# Patient Record
Sex: FEMALE | Race: WHITE | NOT HISPANIC OR LATINO
[De-identification: names, ages, dates, MRNs, and addresses within clinical notes are randomized per-mention and may not be internally consistent; named-entity substitution may affect disease eponyms.]

---

## 2017-05-26 ENCOUNTER — PRIOR ORIGINAL RECORDS (OUTPATIENT)
Dept: OTHER | Age: 67
End: 2017-05-26

## 2017-08-01 ENCOUNTER — HOSPITAL ENCOUNTER (OUTPATIENT)
Dept: GENERAL RADIOLOGY | Age: 67
Discharge: HOME | End: 2017-08-01
Attending: FAMILY MEDICINE
Payer: MEDICARE

## 2017-08-01 ENCOUNTER — OFFICE VISIT (OUTPATIENT)
Dept: FAMILY MEDICINE CLINIC | Facility: CLINIC | Age: 67
End: 2017-08-01

## 2017-08-01 VITALS
TEMPERATURE: 99 F | RESPIRATION RATE: 12 BRPM | BODY MASS INDEX: 35.87 KG/M2 | HEART RATE: 64 BPM | WEIGHT: 205 LBS | DIASTOLIC BLOOD PRESSURE: 102 MMHG | SYSTOLIC BLOOD PRESSURE: 164 MMHG | HEIGHT: 63.5 IN

## 2017-08-01 DIAGNOSIS — E78.00 HYPERCHOLESTEREMIA: ICD-10-CM

## 2017-08-01 DIAGNOSIS — I10 BENIGN ESSENTIAL HYPERTENSION: Primary | ICD-10-CM

## 2017-08-01 DIAGNOSIS — S89.91XA RIGHT KNEE INJURY, INITIAL ENCOUNTER: ICD-10-CM

## 2017-08-01 DIAGNOSIS — Z12.31 ENCOUNTER FOR SCREENING MAMMOGRAM FOR MALIGNANT NEOPLASM OF BREAST: ICD-10-CM

## 2017-08-01 DIAGNOSIS — Z98.890 H/O RIGHT BREAST BIOPSY: ICD-10-CM

## 2017-08-01 PROCEDURE — 73560 X-RAY EXAM OF KNEE 1 OR 2: CPT | Performed by: FAMILY MEDICINE

## 2017-08-01 PROCEDURE — 99204 OFFICE O/P NEW MOD 45 MIN: CPT | Performed by: FAMILY MEDICINE

## 2017-08-01 RX ORDER — BUPRENORPHINE HCL 8 MG/1
1 TABLET SUBLINGUAL DAILY
COMMUNITY
End: 2020-08-03

## 2017-08-01 RX ORDER — PNV NO.95/FERROUS FUM/FOLIC AC 28MG-0.8MG
TABLET ORAL DAILY
COMMUNITY
End: 2018-04-05 | Stop reason: ALTCHOICE

## 2017-08-01 RX ORDER — ROSUVASTATIN CALCIUM 40 MG/1
40 TABLET, COATED ORAL NIGHTLY
COMMUNITY
End: 2018-05-08

## 2017-08-01 RX ORDER — BIOTIN 1 MG
2000 TABLET ORAL DAILY
COMMUNITY
End: 2020-03-05

## 2017-08-01 NOTE — PROGRESS NOTES
HPI:   Whit Harrison is a 74year old female here for her first visit who presents for recheck of hyperlipidemia. Patient reports taking medications as instructed, no medication side effects noted. Denies any generalized muscle aches.  Patient present watches fats closely     REVIEW OF SYSTEMS:   GENERAL HEALTH: feels well otherwise  SKIN: denies any unusual skin lesions or rashes  RESPIRATORY: denies shortness of breath with exertion  CARDIOVASCULAR: denies chest pain on exertion  GI: denies abdominal

## 2017-08-02 ENCOUNTER — TELEPHONE (OUTPATIENT)
Dept: FAMILY MEDICINE CLINIC | Facility: CLINIC | Age: 67
End: 2017-08-02

## 2017-08-02 ENCOUNTER — HOSPITAL ENCOUNTER (OUTPATIENT)
Dept: MRI IMAGING | Facility: HOSPITAL | Age: 67
Discharge: HOME | End: 2017-08-02
Attending: FAMILY MEDICINE
Payer: MEDICARE

## 2017-08-02 DIAGNOSIS — S89.91XA UNSPECIFIED INJURY OF RIGHT LOWER LEG, INITIAL ENCOUNTER: ICD-10-CM

## 2017-08-02 DIAGNOSIS — R93.6 ABNORMAL X-RAY OF LOWER EXTREMITY: ICD-10-CM

## 2017-08-02 DIAGNOSIS — R93.6 ABNORMAL X-RAY OF LOWER EXTREMITY: Primary | ICD-10-CM

## 2017-08-02 PROBLEM — I10 BENIGN ESSENTIAL HYPERTENSION: Status: ACTIVE | Noted: 2017-08-02

## 2017-08-02 PROCEDURE — 73721 MRI JNT OF LWR EXTRE W/O DYE: CPT | Performed by: FAMILY MEDICINE

## 2017-08-03 ENCOUNTER — TELEPHONE (OUTPATIENT)
Dept: FAMILY MEDICINE CLINIC | Facility: CLINIC | Age: 67
End: 2017-08-03

## 2017-08-03 NOTE — TELEPHONE ENCOUNTER
Pt wanted to make Dr. Ramesh Falling aware that she completed the MRI of her right knee last night at [facility] at 6:30 pm. Pt inquiring about results and would like to know where she goes from this point. Please contact pt.

## 2017-08-03 NOTE — TELEPHONE ENCOUNTER
S/w pt. Advised of Dr Guadalupe Arzola, same # as Dr Luciana Arroyo. She agrees to contact. She asks what she should do until appt. She reports she has a knee brace, asks if she should use. I stated she could use. I advised to do activity only as tolerated.  Supportive ca

## 2017-08-03 NOTE — TELEPHONE ENCOUNTER
Pt was referred to DR Dale Cason). She can not get in until 8/24/17. Is this ok? What should she do until then? Please advise. Thank you.

## 2017-08-04 ENCOUNTER — MED REC SCAN ONLY (OUTPATIENT)
Dept: FAMILY MEDICINE CLINIC | Facility: CLINIC | Age: 67
End: 2017-08-04

## 2017-08-21 ENCOUNTER — LAB ENCOUNTER (OUTPATIENT)
Dept: LAB | Age: 67
End: 2017-08-21
Attending: FAMILY MEDICINE
Payer: MEDICARE

## 2017-08-21 ENCOUNTER — HOSPITAL ENCOUNTER (OUTPATIENT)
Dept: MAMMOGRAPHY | Age: 67
Discharge: HOME | End: 2017-08-21
Attending: FAMILY MEDICINE
Payer: MEDICARE

## 2017-08-21 DIAGNOSIS — Z98.890 H/O RIGHT BREAST BIOPSY: ICD-10-CM

## 2017-08-21 DIAGNOSIS — Z12.31 ENCOUNTER FOR SCREENING MAMMOGRAM FOR MALIGNANT NEOPLASM OF BREAST: ICD-10-CM

## 2017-08-21 DIAGNOSIS — S89.91XA RIGHT KNEE INJURY, INITIAL ENCOUNTER: ICD-10-CM

## 2017-08-21 DIAGNOSIS — R73.09 ELEVATED GLUCOSE: ICD-10-CM

## 2017-08-21 DIAGNOSIS — E78.00 HYPERCHOLESTEREMIA: ICD-10-CM

## 2017-08-21 DIAGNOSIS — I10 BENIGN ESSENTIAL HYPERTENSION: ICD-10-CM

## 2017-08-21 LAB
ALBUMIN SERPL-MCNC: 4 G/DL (ref 3.5–4.8)
ALP LIVER SERPL-CCNC: 116 U/L (ref 50–130)
ALT SERPL-CCNC: 35 U/L (ref 14–54)
AST SERPL-CCNC: 23 U/L (ref 15–41)
BASOPHILS # BLD AUTO: 0.06 X10(3) UL (ref 0–0.1)
BASOPHILS NFR BLD AUTO: 1 %
BILIRUB SERPL-MCNC: 0.5 MG/DL (ref 0.1–2)
BUN BLD-MCNC: 18 MG/DL (ref 8–20)
CALCIUM BLD-MCNC: 9.6 MG/DL (ref 8.3–10.3)
CHLORIDE: 104 MMOL/L (ref 101–111)
CHOLEST SMN-MCNC: 199 MG/DL (ref ?–200)
CO2: 28 MMOL/L (ref 22–32)
CREAT BLD-MCNC: 0.99 MG/DL (ref 0.55–1.02)
EOSINOPHIL # BLD AUTO: 0.25 X10(3) UL (ref 0–0.3)
EOSINOPHIL NFR BLD AUTO: 4.4 %
ERYTHROCYTE [DISTWIDTH] IN BLOOD BY AUTOMATED COUNT: 15.9 % (ref 11.5–16)
GLUCOSE BLD-MCNC: 106 MG/DL (ref 70–99)
HCT VFR BLD AUTO: 38.1 % (ref 34–50)
HDLC SERPL-MCNC: 85 MG/DL (ref 45–?)
HDLC SERPL: 2.34 {RATIO} (ref ?–4.44)
HGB BLD-MCNC: 12 G/DL (ref 12–16)
IMMATURE GRANULOCYTE COUNT: 0.02 X10(3) UL (ref 0–1)
IMMATURE GRANULOCYTE RATIO %: 0.3 %
LDLC SERPL CALC-MCNC: 79 MG/DL (ref ?–130)
LDLC SERPL-MCNC: 35 MG/DL (ref 5–40)
LYMPHOCYTES # BLD AUTO: 1.85 X10(3) UL (ref 0.9–4)
LYMPHOCYTES NFR BLD AUTO: 32.3 %
M PROTEIN MFR SERPL ELPH: 8.2 G/DL (ref 6.1–8.3)
MCH RBC QN AUTO: 28.1 PG (ref 27–33.2)
MCHC RBC AUTO-ENTMCNC: 31.5 G/DL (ref 31–37)
MCV RBC AUTO: 89.2 FL (ref 81–100)
MONOCYTES # BLD AUTO: 0.58 X10(3) UL (ref 0.1–0.6)
MONOCYTES NFR BLD AUTO: 10.1 %
NEUTROPHIL ABS PRELIM: 2.96 X10 (3) UL (ref 1.3–6.7)
NEUTROPHILS # BLD AUTO: 2.96 X10(3) UL (ref 1.3–6.7)
NEUTROPHILS NFR BLD AUTO: 51.9 %
NONHDLC SERPL-MCNC: 114 MG/DL (ref ?–130)
PLATELET # BLD AUTO: 314 10(3)UL (ref 150–450)
POTASSIUM SERPL-SCNC: 3.8 MMOL/L (ref 3.6–5.1)
RBC # BLD AUTO: 4.27 X10(6)UL (ref 3.8–5.1)
RED CELL DISTRIBUTION WIDTH-SD: 52.1 FL (ref 35.1–46.3)
SODIUM SERPL-SCNC: 139 MMOL/L (ref 136–144)
TRIGLYCERIDES: 177 MG/DL (ref ?–150)
WBC # BLD AUTO: 5.7 X10(3) UL (ref 4–13)

## 2017-08-21 PROCEDURE — 85025 COMPLETE CBC W/AUTO DIFF WBC: CPT

## 2017-08-21 PROCEDURE — 80053 COMPREHEN METABOLIC PANEL: CPT

## 2017-08-21 PROCEDURE — 77067 SCR MAMMO BI INCL CAD: CPT | Performed by: FAMILY MEDICINE

## 2017-08-21 PROCEDURE — 36415 COLL VENOUS BLD VENIPUNCTURE: CPT

## 2017-08-21 PROCEDURE — 77063 BREAST TOMOSYNTHESIS BI: CPT | Performed by: FAMILY MEDICINE

## 2017-08-21 PROCEDURE — 83036 HEMOGLOBIN GLYCOSYLATED A1C: CPT

## 2017-08-21 PROCEDURE — 80061 LIPID PANEL: CPT

## 2017-08-22 ENCOUNTER — TELEPHONE (OUTPATIENT)
Dept: FAMILY MEDICINE CLINIC | Facility: CLINIC | Age: 67
End: 2017-08-22

## 2017-08-22 DIAGNOSIS — R73.09 ELEVATED GLUCOSE: Primary | ICD-10-CM

## 2017-08-22 PROBLEM — M17.11 PRIMARY OSTEOARTHRITIS OF RIGHT KNEE: Status: ACTIVE | Noted: 2017-08-22

## 2017-08-22 PROBLEM — S83.241A ACUTE MEDIAL MENISCUS TEAR OF RIGHT KNEE, INITIAL ENCOUNTER: Status: ACTIVE | Noted: 2017-08-22

## 2017-08-22 LAB
EST. AVERAGE GLUCOSE BLD GHB EST-MCNC: 126 MG/DL (ref 68–126)
HBA1C MFR BLD HPLC: 6 % (ref ?–5.7)

## 2017-08-24 ENCOUNTER — TELEPHONE (OUTPATIENT)
Dept: FAMILY MEDICINE CLINIC | Facility: CLINIC | Age: 67
End: 2017-08-24

## 2017-08-24 DIAGNOSIS — I10 BENIGN ESSENTIAL HYPERTENSION: Primary | ICD-10-CM

## 2017-08-24 DIAGNOSIS — R73.03 PRE-DIABETES: ICD-10-CM

## 2017-08-24 DIAGNOSIS — E78.00 HYPERCHOLESTEREMIA: ICD-10-CM

## 2017-08-31 ENCOUNTER — HOSPITAL ENCOUNTER (OUTPATIENT)
Dept: MAMMOGRAPHY | Facility: HOSPITAL | Age: 67
Discharge: HOME | End: 2017-08-31
Attending: FAMILY MEDICINE
Payer: MEDICARE

## 2017-08-31 DIAGNOSIS — R92.8 ABNORMAL MAMMOGRAM OF RIGHT BREAST: ICD-10-CM

## 2017-08-31 PROCEDURE — 77065 DX MAMMO INCL CAD UNI: CPT | Performed by: FAMILY MEDICINE

## 2017-08-31 PROCEDURE — 77061 BREAST TOMOSYNTHESIS UNI: CPT | Performed by: FAMILY MEDICINE

## 2017-08-31 PROCEDURE — 76642 ULTRASOUND BREAST LIMITED: CPT | Performed by: FAMILY MEDICINE

## 2017-09-01 ENCOUNTER — PATIENT MESSAGE (OUTPATIENT)
Dept: FAMILY MEDICINE CLINIC | Facility: CLINIC | Age: 67
End: 2017-09-01

## 2017-09-01 NOTE — TELEPHONE ENCOUNTER
Call back from pt-sts she did receive mychart messg with dr comments re labs-glucose, A1C, total and LDL cholesterol continuing rosuvastatin and repeating labs in 6 months, \"but  did not specifically address my elevated triglycerides.  Would like his com

## 2017-10-12 ENCOUNTER — IMMUNIZATION (OUTPATIENT)
Dept: FAMILY MEDICINE CLINIC | Facility: CLINIC | Age: 67
End: 2017-10-12

## 2017-10-12 PROCEDURE — G0008 ADMIN INFLUENZA VIRUS VAC: HCPCS | Performed by: FAMILY MEDICINE

## 2017-10-12 PROCEDURE — 90653 IIV ADJUVANT VACCINE IM: CPT | Performed by: FAMILY MEDICINE

## 2017-11-03 ENCOUNTER — OFFICE VISIT (OUTPATIENT)
Dept: FAMILY MEDICINE CLINIC | Facility: CLINIC | Age: 67
End: 2017-11-03

## 2017-11-03 VITALS
SYSTOLIC BLOOD PRESSURE: 130 MMHG | RESPIRATION RATE: 20 BRPM | BODY MASS INDEX: 35.68 KG/M2 | HEART RATE: 80 BPM | WEIGHT: 209 LBS | DIASTOLIC BLOOD PRESSURE: 80 MMHG | HEIGHT: 64 IN | TEMPERATURE: 99 F

## 2017-11-03 DIAGNOSIS — Z00.00 ENCOUNTER FOR ANNUAL HEALTH EXAMINATION: Primary | ICD-10-CM

## 2017-11-03 DIAGNOSIS — Z12.11 ENCOUNTER FOR SCREENING COLONOSCOPY: ICD-10-CM

## 2017-11-03 DIAGNOSIS — M17.11 PRIMARY OSTEOARTHRITIS OF RIGHT KNEE: ICD-10-CM

## 2017-11-03 DIAGNOSIS — N95.1 FLUSHING, MENOPAUSAL: ICD-10-CM

## 2017-11-03 DIAGNOSIS — Z23 NEED FOR VACCINATION: ICD-10-CM

## 2017-11-03 DIAGNOSIS — E78.00 HYPERCHOLESTEREMIA: ICD-10-CM

## 2017-11-03 DIAGNOSIS — K21.9 GASTROESOPHAGEAL REFLUX DISEASE WITHOUT ESOPHAGITIS: ICD-10-CM

## 2017-11-03 DIAGNOSIS — I10 BENIGN ESSENTIAL HYPERTENSION: ICD-10-CM

## 2017-11-03 DIAGNOSIS — Z78.0 POSTMENOPAUSAL: ICD-10-CM

## 2017-11-03 PROCEDURE — 99213 OFFICE O/P EST LOW 20 MIN: CPT | Performed by: FAMILY MEDICINE

## 2017-11-03 PROCEDURE — 90732 PPSV23 VACC 2 YRS+ SUBQ/IM: CPT | Performed by: FAMILY MEDICINE

## 2017-11-03 PROCEDURE — G0009 ADMIN PNEUMOCOCCAL VACCINE: HCPCS | Performed by: FAMILY MEDICINE

## 2017-11-03 PROCEDURE — G0402 INITIAL PREVENTIVE EXAM: HCPCS | Performed by: FAMILY MEDICINE

## 2017-11-03 RX ORDER — CARVEDILOL 6.25 MG/1
6.25 TABLET ORAL 2 TIMES DAILY WITH MEALS
COMMUNITY
Start: 2017-09-26 | End: 2018-09-13

## 2017-11-03 RX ORDER — LOSARTAN POTASSIUM AND HYDROCHLOROTHIAZIDE 25; 100 MG/1; MG/1
1 TABLET ORAL DAILY
COMMUNITY
Start: 2015-02-15 | End: 2018-07-12

## 2017-11-03 RX ORDER — ESTRADIOL 1 MG/1
1 TABLET ORAL DAILY
Qty: 30 TABLET | Refills: 5 | Status: SHIPPED | OUTPATIENT
Start: 2017-11-03 | End: 2018-05-01

## 2017-11-03 RX ORDER — PANTOPRAZOLE SODIUM 40 MG/1
40 TABLET, DELAYED RELEASE ORAL
COMMUNITY
Start: 2000-06-02 | End: 2018-05-08

## 2017-11-03 RX ORDER — HYDRALAZINE HYDROCHLORIDE 50 MG/1
TABLET, FILM COATED ORAL 2 TIMES DAILY
COMMUNITY
Start: 2017-09-11 | End: 2018-05-08 | Stop reason: ALTCHOICE

## 2017-11-03 RX ORDER — MULTIVIT,MIN/FOLIC ACID/HRB157 400 MCG
TABLET ORAL
COMMUNITY
Start: 2017-10-06 | End: 2017-11-05

## 2017-11-03 NOTE — PROGRESS NOTES
HPI:   Ilan Rowe is a 74year old female who presents for a Medicare Initial Preventative Physical Exam (Welcome to Medicare- < 12 months on Medicare). Patient states that overall she has felt well.   She did see Dr. Quan Sandoval in regard to her ri atorvastatin.     CURRENT MEDICATIONS:     Outpatient Prescriptions Marked as Taking for the 11/3/17 encounter (Office Visit) with Zana Harvey MD:  Losartan Potassium-HCTZ 100-25 MG Oral Tab    Pantoprazole Sodium 40 MG Oral Tab EC    hydrALAzine HC (37.1 °C) (Oral)   Resp 20   Ht 64\"   Wt 209 lb   BMI 35.87 kg/m²  Estimated body mass index is 35.87 kg/m² as calculated from the following:    Height as of this encounter: 64\". Weight as of this encounter: 209 lb.     Medicare Hearing Assessment  (Re Derivative, [place] 10/16/2017       ASSESSMENT AND OTHER RELEVANT CHRONIC CONDITIONS:   Bienvenido Ryan is a 74year old female who presents for a Medicare Assessment.      PLAN SUMMARY:   Diagnoses and all orders for this visit:    Encounter for annual Health     In the past six months, have you lost more than 10 pounds without trying?: 2 - No    Has your appetite been poor?: No    How does the patient maintain a good energy level?: Appropriate Exercise;Daily Walks    How would you describe your daily ph or hopeless (over the last two weeks)?: Several days    PHQ-2 SCORE: 2        Advance Directives     Do you have a healthcare power of ?: Yes    Do you have a living will?: Yes     Please go to \"Cognitive Assessment\" under Medicare Assessment sec reminders to display for this patient. Update Health Maintenance if applicable    Chlamydia  Annually if high risk No results found for: CHLAMYDIA No flowsheet data found.     Screening Mammogram      Mammogram Annually to 76, then as discussed Valentine Morrison Diabetes      HgbA1C  Annually HgbA1C (%)   Date Value   08/21/2017 6.0 (H)    No flowsheet data found. Creat/alb ratio  Annually      LDL  Annually LDL Cholesterol (mg/dL)   Date Value   08/21/2017 79    No flowsheet data found.      Dilated Eye exam

## 2017-11-03 NOTE — PATIENT INSTRUCTIONS
Maury Valerio's SCREENING SCHEDULE   Tests on this list are recommended by your physician but may not be covered, or covered at this frequency, by your insurer. Please check with your insurance carrier before scheduling to verify coverage.    Azalea Perales Covered every 10 years- more often if abnormal Colonoscopy,10 Years due on 02/02/2002 Update Health Maintenance if applicable    Flex Sigmoidoscopy Screen  Covered every 5 years No results found for this or any previous visit. No flowsheet data found. (Pneumovax)  Covered Once after 65 No orders found for this or any previous visit. Please get once after your 65th birthday    Hepatitis B for Moderate/High Risk       No orders found for this or any previous visit.  Medium/high risk factors:   End-stage re Procedure   Diabetes Screening      HbgA1C    At Least  Annually for Diabetics HgbA1C (%)   Date Value   08/21/2017 6.0 (H)    No flowsheet data found.     Fasting Blood Sugar (FSB)   Patient must be diagnosed with one of the following:   • Hypertension   • flowsheet data found.      Barium Enema-   uncomfortable but covered  Covered but uncomfortable   Glaucoma Screening      Ophthalmology Visit   Covered annually for Diabetics, people with Glaucoma family history,   Americans over age 48   Rizwana or IX concentrates   Clients of institutions for the mentally retarded   Persons who live in the same house as a HepB virus carrier   Homosexual men   Illicit injectable drug abusers     Tetanus Toxoid- Only covered with a cut with metal- TD and TDaP Not c

## 2017-11-05 PROBLEM — N95.1 FLUSHING, MENOPAUSAL: Status: ACTIVE | Noted: 2017-11-05

## 2017-11-07 ENCOUNTER — TELEPHONE (OUTPATIENT)
Dept: FAMILY MEDICINE CLINIC | Facility: CLINIC | Age: 67
End: 2017-11-07

## 2017-11-07 NOTE — TELEPHONE ENCOUNTER
Received request from Cinthya to do a PA for pt's Estradiol.   [facility]

## 2017-11-07 NOTE — TELEPHONE ENCOUNTER
Pt states Cinthya sent over a pre-authorization for estradiol (ESTRACE) 1 MG Oral Tab on Friday. Please advise. This msg printed and put on Debby's desk.

## 2017-11-09 NOTE — TELEPHONE ENCOUNTER
Letter of determination received, pt's Estradiol tablets approved. 8/11/2017 through 11/9/2018. Cert number [id number]. Called to Kristi spoke with [facility] who voiced understanding and agreed to plan.

## 2017-11-17 ENCOUNTER — HOSPITAL ENCOUNTER (OUTPATIENT)
Dept: BONE DENSITY | Age: 67
Discharge: HOME | End: 2017-11-17
Attending: FAMILY MEDICINE
Payer: MEDICARE

## 2017-11-17 DIAGNOSIS — Z78.0 POSTMENOPAUSAL: ICD-10-CM

## 2017-11-17 PROCEDURE — 77080 DXA BONE DENSITY AXIAL: CPT | Performed by: FAMILY MEDICINE

## 2017-12-11 ENCOUNTER — SURGERY (OUTPATIENT)
Age: 67
End: 2017-12-11

## 2017-12-11 ENCOUNTER — HOSPITAL ENCOUNTER (OUTPATIENT)
Facility: HOSPITAL | Age: 67
Setting detail: HOSPITAL OUTPATIENT SURGERY
Discharge: HOME | End: 2017-12-11
Attending: INTERNAL MEDICINE | Admitting: INTERNAL MEDICINE
Payer: MEDICARE

## 2017-12-11 VITALS
DIASTOLIC BLOOD PRESSURE: 65 MMHG | HEART RATE: 59 BPM | HEIGHT: 63.5 IN | WEIGHT: 208 LBS | RESPIRATION RATE: 16 BRPM | OXYGEN SATURATION: 96 % | SYSTOLIC BLOOD PRESSURE: 100 MMHG | BODY MASS INDEX: 36.4 KG/M2 | TEMPERATURE: 98 F

## 2017-12-11 DIAGNOSIS — Z86.010 PERSONAL HISTORY OF COLONIC POLYPS: ICD-10-CM

## 2017-12-11 DIAGNOSIS — K22.70 BARRETT'S ESOPHAGUS WITHOUT DYSPLASIA: ICD-10-CM

## 2017-12-11 PROCEDURE — 0DB58ZX EXCISION OF ESOPHAGUS, VIA NATURAL OR ARTIFICIAL OPENING ENDOSCOPIC, DIAGNOSTIC: ICD-10-PCS | Performed by: INTERNAL MEDICINE

## 2017-12-11 PROCEDURE — 0DBH8ZX EXCISION OF CECUM, VIA NATURAL OR ARTIFICIAL OPENING ENDOSCOPIC, DIAGNOSTIC: ICD-10-PCS | Performed by: INTERNAL MEDICINE

## 2017-12-11 PROCEDURE — 88305 TISSUE EXAM BY PATHOLOGIST: CPT | Performed by: INTERNAL MEDICINE

## 2017-12-11 RX ORDER — MIDAZOLAM HYDROCHLORIDE 1 MG/ML
INJECTION INTRAMUSCULAR; INTRAVENOUS
Status: DISCONTINUED | OUTPATIENT
Start: 2017-12-11 | End: 2017-12-11

## 2017-12-11 RX ORDER — SODIUM CHLORIDE, SODIUM LACTATE, POTASSIUM CHLORIDE, CALCIUM CHLORIDE 600; 310; 30; 20 MG/100ML; MG/100ML; MG/100ML; MG/100ML
INJECTION, SOLUTION INTRAVENOUS CONTINUOUS
Status: DISCONTINUED | OUTPATIENT
Start: 2017-12-11 | End: 2017-12-11

## 2017-12-11 NOTE — BRIEF OP NOTE
Pre-Operative Diagnosis: Personal history of colonic polyps [Z86.010]  Pepper's esophagus without dysplasia [K22.70]     Post-Operative Diagnosis: EGD=large hiatal hernia, mild esophagitis   COLON=polyp, diverticulosis     Procedure Performed:   Proced

## 2017-12-11 NOTE — OPERATIVE REPORT
PATIENT NAME: Daisy Mcnamara  MRN: [id number]  DATE OF OPERATION: 12/11/2017  REFERRING PHYSICIAN: Dr. Ross Prior  Medications:  Versed 12 mg IVP              Fentanyl 100 mcg IVP  DATE OF LAST COLONOSCOPY:  2012  PREOPERATIVE DIAGNOSIS:   personal history colonoscopy was performed. The video pediatric colonoscope was passed from anus to cecum. The ileocecal valve, appendiceal orfice, hepatic and splenic flexures were all well visualized.   The bowel preparation on the Aronchick bowel prep score was 1. (1 -

## 2017-12-11 NOTE — H&P
H and P Update    The history obtained by Dr. Matthias Lorenzana dated 11/20/17, has no changes.     GENERAL: well developed, well nourished, in no apparent distress  HEENT: atraumatic, normocephalic, ears and throat are clear  NECK: suppl

## 2017-12-13 NOTE — PROGRESS NOTES
Here are the biopsy/pathology findings from your recent EGD (uppper endoscopy) and colonoscopy: The biopsy/pathology findings from your upper endoscopy showed: The esophageal biopsies show mild inflammation without Pepper's esophagitis.     The  biopsy

## 2018-04-05 ENCOUNTER — PRIOR ORIGINAL RECORDS (OUTPATIENT)
Dept: OTHER | Age: 68
End: 2018-04-05

## 2018-04-05 ENCOUNTER — MYAURORA ACCOUNT LINK (OUTPATIENT)
Dept: OTHER | Age: 68
End: 2018-04-05

## 2018-04-05 ENCOUNTER — OFFICE VISIT (OUTPATIENT)
Dept: FAMILY MEDICINE CLINIC | Facility: CLINIC | Age: 68
End: 2018-04-05

## 2018-04-05 VITALS
BODY MASS INDEX: 37 KG/M2 | RESPIRATION RATE: 16 BRPM | HEART RATE: 68 BPM | WEIGHT: 213 LBS | DIASTOLIC BLOOD PRESSURE: 78 MMHG | TEMPERATURE: 99 F | SYSTOLIC BLOOD PRESSURE: 134 MMHG

## 2018-04-05 DIAGNOSIS — I10 BENIGN ESSENTIAL HYPERTENSION: Primary | ICD-10-CM

## 2018-04-05 DIAGNOSIS — R06.81 APNEA: ICD-10-CM

## 2018-04-05 DIAGNOSIS — K22.70 BARRETT'S ESOPHAGUS WITHOUT DYSPLASIA: ICD-10-CM

## 2018-04-05 DIAGNOSIS — E78.00 HYPERCHOLESTEREMIA: ICD-10-CM

## 2018-04-05 PROCEDURE — 99215 OFFICE O/P EST HI 40 MIN: CPT | Performed by: FAMILY MEDICINE

## 2018-04-05 NOTE — PROGRESS NOTES
HPI:   Yaya Enciso is a 79year old female here for her first visit who presents for recheck of hyperlipidemia. Patient reports taking medications as instructed, no medication side effects noted. Denies any generalized muscle aches.  Patient present tablet by mouth daily. Disp:  Rfl:    Biotin 1000 MCG Oral Tab Take 1,000 mcg by mouth daily.    Disp:  Rfl:       Past Medical History:   Diagnosis Date   • Pepper's esophagus    • Diverticulosis of large intestine    • Esophageal reflux    • High blood p 30    ASSESSMENT AND PLAN:   1. Benign essential hypertension  Continue losartan-HCTZ 100-25 mg 1 tab daily  Continue hydralazine 50 mg p.o. twice daily  Continue carvedilol 6.25 mg p.o. twice daily    2.  Hypercholesteremia  Continue rosuvastatin 40 mg 1 t

## 2018-04-11 ENCOUNTER — MYAURORA ACCOUNT LINK (OUTPATIENT)
Dept: OTHER | Age: 68
End: 2018-04-11

## 2018-04-11 ENCOUNTER — OFFICE VISIT (OUTPATIENT)
Dept: SLEEP CENTER | Facility: HOSPITAL | Age: 68
End: 2018-04-11
Attending: FAMILY MEDICINE
Payer: MEDICARE

## 2018-04-11 ENCOUNTER — HOSPITAL ENCOUNTER (OUTPATIENT)
Dept: CARDIOLOGY CLINIC | Facility: HOSPITAL | Age: 68
Discharge: HOME | End: 2018-04-11
Attending: INTERNAL MEDICINE

## 2018-04-11 DIAGNOSIS — I65.23 BILATERAL CAROTID ARTERY STENOSIS: ICD-10-CM

## 2018-04-11 PROCEDURE — 95810 POLYSOM 6/> YRS 4/> PARAM: CPT

## 2018-04-12 LAB
ALBUMIN: 4.3 G/DL
ALKALINE PHOSPHATATE(ALK PHOS): 113 IU/L
ALT (SGPT): 18 U/L
AST (SGOT): 20 U/L
BILIRUBIN TOTAL: 0.5 MG/DL
BUN: 18 MG/DL
CALCIUM: 10.1 MG/DL
CHLORIDE: 102 MEQ/L
CREATININE, SERUM: 1 MG/DL
GLUCOSE: 123 MG/DL
GLUCOSE: 123 MG/DL
HEMOGLOBIN A1C: 5.9 %
POTASSIUM, SERUM: 4.3 MEQ/L
PROTEIN, TOTAL: 7.2 G/DL
SGOT (AST): 20 IU/L
SGPT (ALT): 18 IU/L
SODIUM: 140 MEQ/L

## 2018-04-12 NOTE — PROCEDURES
[address]       Accredited by the [facility] ([facility])    PATIENT'S NAME:        Antoine Keith  ATTENDING PHYSICIAN:   Tula Severs, M.D.   REFERRING PHYSICIAN:   Mary Holt, sleep.  Slow-wave sleep was seen in normal to increased amounts, comprising 33.3% of sleep. REM sleep was seen in decreased amounts, comprising 16.5% of sleep.     RESPIRATORY MEASURES:  Respiratory monitoring showed obstructive apneas and hypopneas that w

## 2018-04-18 ENCOUNTER — APPOINTMENT (OUTPATIENT)
Dept: LAB | Age: 68
End: 2018-04-18
Attending: FAMILY MEDICINE
Payer: MEDICARE

## 2018-04-18 DIAGNOSIS — I10 BENIGN ESSENTIAL HYPERTENSION: ICD-10-CM

## 2018-04-18 DIAGNOSIS — E78.00 HYPERCHOLESTEREMIA: ICD-10-CM

## 2018-04-18 DIAGNOSIS — R73.03 PRE-DIABETES: ICD-10-CM

## 2018-04-18 PROCEDURE — 83036 HEMOGLOBIN GLYCOSYLATED A1C: CPT

## 2018-04-18 PROCEDURE — 80061 LIPID PANEL: CPT

## 2018-04-18 PROCEDURE — 36415 COLL VENOUS BLD VENIPUNCTURE: CPT

## 2018-04-18 PROCEDURE — 80053 COMPREHEN METABOLIC PANEL: CPT

## 2018-04-19 DIAGNOSIS — R79.89 ELEVATED SERUM CREATININE: Primary | ICD-10-CM

## 2018-04-27 ENCOUNTER — APPOINTMENT (OUTPATIENT)
Dept: LAB | Age: 68
End: 2018-04-27
Attending: FAMILY MEDICINE
Payer: MEDICARE

## 2018-04-27 DIAGNOSIS — R79.89 ELEVATED SERUM CREATININE: ICD-10-CM

## 2018-04-27 PROCEDURE — 80048 BASIC METABOLIC PNL TOTAL CA: CPT

## 2018-04-27 PROCEDURE — 36415 COLL VENOUS BLD VENIPUNCTURE: CPT

## 2018-05-01 ENCOUNTER — MED REC SCAN ONLY (OUTPATIENT)
Dept: FAMILY MEDICINE CLINIC | Facility: CLINIC | Age: 68
End: 2018-05-01

## 2018-05-01 DIAGNOSIS — Z78.0 POSTMENOPAUSAL: ICD-10-CM

## 2018-05-01 DIAGNOSIS — I10 BENIGN ESSENTIAL HYPERTENSION: ICD-10-CM

## 2018-05-01 DIAGNOSIS — N95.1 FLUSHING, MENOPAUSAL: ICD-10-CM

## 2018-05-01 DIAGNOSIS — R79.89 ELEVATED SERUM CREATININE: Primary | ICD-10-CM

## 2018-05-01 RX ORDER — ESTRADIOL 1 MG/1
TABLET ORAL
Qty: 30 TABLET | Refills: 0 | Status: SHIPPED | OUTPATIENT
Start: 2018-05-01 | End: 2018-05-07

## 2018-05-02 ENCOUNTER — HOSPITAL ENCOUNTER (OUTPATIENT)
Dept: ULTRASOUND IMAGING | Facility: HOSPITAL | Age: 68
Discharge: HOME | End: 2018-05-02
Attending: FAMILY MEDICINE
Payer: MEDICARE

## 2018-05-02 DIAGNOSIS — R79.89 ELEVATED SERUM CREATININE: ICD-10-CM

## 2018-05-02 PROCEDURE — 76775 US EXAM ABDO BACK WALL LIM: CPT | Performed by: FAMILY MEDICINE

## 2018-05-03 ENCOUNTER — TELEPHONE (OUTPATIENT)
Dept: FAMILY MEDICINE CLINIC | Facility: CLINIC | Age: 68
End: 2018-05-03

## 2018-05-03 NOTE — TELEPHONE ENCOUNTER
Pt dropped off two different renal us and wants them compared. Please call pt at preferred number when done. Thank you.  Results will be in 's bin

## 2018-05-07 DIAGNOSIS — Z78.0 POSTMENOPAUSAL: ICD-10-CM

## 2018-05-07 DIAGNOSIS — N95.1 FLUSHING, MENOPAUSAL: ICD-10-CM

## 2018-05-07 RX ORDER — ESTRADIOL 1 MG/1
TABLET ORAL
Qty: 30 TABLET | Refills: 0 | Status: SHIPPED | OUTPATIENT
Start: 2018-05-07 | End: 2018-05-08

## 2018-05-08 ENCOUNTER — OFFICE VISIT (OUTPATIENT)
Dept: FAMILY MEDICINE CLINIC | Facility: CLINIC | Age: 68
End: 2018-05-08

## 2018-05-08 ENCOUNTER — OFFICE VISIT (OUTPATIENT)
Dept: SLEEP CENTER | Facility: HOSPITAL | Age: 68
End: 2018-05-08
Attending: INTERNAL MEDICINE
Payer: MEDICARE

## 2018-05-08 VITALS
SYSTOLIC BLOOD PRESSURE: 134 MMHG | HEART RATE: 56 BPM | DIASTOLIC BLOOD PRESSURE: 82 MMHG | WEIGHT: 214 LBS | TEMPERATURE: 99 F | BODY MASS INDEX: 37 KG/M2 | RESPIRATION RATE: 12 BRPM

## 2018-05-08 DIAGNOSIS — I10 BENIGN ESSENTIAL HYPERTENSION: ICD-10-CM

## 2018-05-08 DIAGNOSIS — Z78.0 POSTMENOPAUSAL: ICD-10-CM

## 2018-05-08 DIAGNOSIS — K22.70 BARRETT'S ESOPHAGUS WITHOUT DYSPLASIA: ICD-10-CM

## 2018-05-08 DIAGNOSIS — N28.9 RENAL INSUFFICIENCY: Primary | ICD-10-CM

## 2018-05-08 DIAGNOSIS — G47.10 HYPERSOMNIA: ICD-10-CM

## 2018-05-08 DIAGNOSIS — N95.1 FLUSHING, MENOPAUSAL: ICD-10-CM

## 2018-05-08 DIAGNOSIS — G47.33 OSA (OBSTRUCTIVE SLEEP APNEA): ICD-10-CM

## 2018-05-08 DIAGNOSIS — E78.00 HYPERCHOLESTEREMIA: ICD-10-CM

## 2018-05-08 DIAGNOSIS — I10 ESSENTIAL HYPERTENSION: ICD-10-CM

## 2018-05-08 PROCEDURE — 95811 POLYSOM 6/>YRS CPAP 4/> PARM: CPT

## 2018-05-08 PROCEDURE — 99214 OFFICE O/P EST MOD 30 MIN: CPT | Performed by: FAMILY MEDICINE

## 2018-05-08 RX ORDER — ESTRADIOL 1 MG/1
TABLET ORAL
Qty: 90 TABLET | Refills: 1 | Status: SHIPPED | OUTPATIENT
Start: 2018-05-08 | End: 2018-12-03

## 2018-05-08 RX ORDER — ROSUVASTATIN CALCIUM 40 MG/1
40 TABLET, COATED ORAL NIGHTLY
Qty: 90 TABLET | Refills: 1 | Status: SHIPPED | OUTPATIENT
Start: 2018-05-08 | End: 2018-11-03

## 2018-05-08 RX ORDER — RANITIDINE 150 MG/1
150 CAPSULE ORAL 2 TIMES DAILY
Qty: 60 CAPSULE | Refills: 2 | Status: SHIPPED | OUTPATIENT
Start: 2018-05-08 | End: 2018-07-30

## 2018-05-13 PROBLEM — N28.9 RENAL INSUFFICIENCY: Status: ACTIVE | Noted: 2018-05-13

## 2018-05-14 NOTE — PROGRESS NOTES
Mild renal insufficiency    Patient is here to discuss her recent lab work. Her creatinine on 4/27/2018 was mildly elevated at 1.22.   A review of her labs posted on Care Everywhere showed her creatinine to have been in that range in the past.  An ultrasou

## 2018-07-06 ENCOUNTER — APPOINTMENT (OUTPATIENT)
Dept: LAB | Age: 68
End: 2018-07-06
Attending: FAMILY MEDICINE
Payer: MEDICARE

## 2018-07-06 DIAGNOSIS — I10 BENIGN ESSENTIAL HYPERTENSION: ICD-10-CM

## 2018-07-06 DIAGNOSIS — R79.89 ELEVATED SERUM CREATININE: ICD-10-CM

## 2018-07-06 LAB
ALBUMIN SERPL-MCNC: 3.2 G/DL (ref 3.5–4.8)
ALP LIVER SERPL-CCNC: 71 U/L (ref 55–142)
ALT SERPL-CCNC: 22 U/L (ref 14–54)
AST SERPL-CCNC: 20 U/L (ref 15–41)
BILIRUB SERPL-MCNC: 0.3 MG/DL (ref 0.1–2)
BUN BLD-MCNC: 18 MG/DL (ref 8–20)
CALCIUM BLD-MCNC: 8.6 MG/DL (ref 8.3–10.3)
CHLORIDE: 106 MMOL/L (ref 101–111)
CO2: 27 MMOL/L (ref 22–32)
CREAT BLD-MCNC: 1.05 MG/DL (ref 0.55–1.02)
GLUCOSE BLD-MCNC: 96 MG/DL (ref 70–99)
M PROTEIN MFR SERPL ELPH: 7 G/DL (ref 6.1–8.3)
POTASSIUM SERPL-SCNC: 4.1 MMOL/L (ref 3.6–5.1)
SODIUM SERPL-SCNC: 141 MMOL/L (ref 136–144)

## 2018-07-06 PROCEDURE — 36415 COLL VENOUS BLD VENIPUNCTURE: CPT

## 2018-07-06 PROCEDURE — 80053 COMPREHEN METABOLIC PANEL: CPT

## 2018-07-09 DIAGNOSIS — R79.89 ELEVATED SERUM CREATININE: Primary | ICD-10-CM

## 2018-07-12 DIAGNOSIS — I10 BENIGN ESSENTIAL HYPERTENSION: ICD-10-CM

## 2018-07-12 RX ORDER — LOSARTAN POTASSIUM AND HYDROCHLOROTHIAZIDE 25; 100 MG/1; MG/1
1 TABLET ORAL DAILY
Qty: 90 TABLET | Refills: 0 | Status: SHIPPED | OUTPATIENT
Start: 2018-07-12 | End: 2018-10-04

## 2018-07-12 NOTE — TELEPHONE ENCOUNTER
Medication not originally prescribed by Dr. Domi Adams. Last office visit 5/8/2018.   Please approve if appropriate, thank you

## 2018-07-16 PROBLEM — Z99.89 OSA ON CPAP: Status: ACTIVE | Noted: 2018-07-16

## 2018-07-16 PROBLEM — G47.33 OSA ON CPAP: Status: ACTIVE | Noted: 2018-07-16

## 2018-07-30 DIAGNOSIS — K22.70 BARRETT'S ESOPHAGUS WITHOUT DYSPLASIA: ICD-10-CM

## 2018-07-30 RX ORDER — RANITIDINE 150 MG/1
CAPSULE ORAL
Qty: 180 CAPSULE | Refills: 0 | Status: SHIPPED | OUTPATIENT
Start: 2018-07-30 | End: 2018-10-24

## 2018-08-03 ENCOUNTER — TELEPHONE (OUTPATIENT)
Dept: FAMILY MEDICINE CLINIC | Facility: CLINIC | Age: 68
End: 2018-08-03

## 2018-08-15 ENCOUNTER — HOSPITAL ENCOUNTER (OUTPATIENT)
Dept: MAMMOGRAPHY | Age: 68
Discharge: HOME | End: 2018-08-15
Attending: FAMILY MEDICINE
Payer: MEDICARE

## 2018-08-15 DIAGNOSIS — Z12.31 ENCOUNTER FOR SCREENING MAMMOGRAM FOR MALIGNANT NEOPLASM OF BREAST: ICD-10-CM

## 2018-08-15 PROCEDURE — 77067 SCR MAMMO BI INCL CAD: CPT | Performed by: FAMILY MEDICINE

## 2018-08-15 PROCEDURE — 77063 BREAST TOMOSYNTHESIS BI: CPT | Performed by: FAMILY MEDICINE

## 2018-08-17 ENCOUNTER — OFFICE VISIT (OUTPATIENT)
Dept: FAMILY MEDICINE CLINIC | Facility: CLINIC | Age: 68
End: 2018-08-17
Payer: MEDICARE

## 2018-08-17 VITALS
BODY MASS INDEX: 36.54 KG/M2 | HEIGHT: 64 IN | RESPIRATION RATE: 12 BRPM | SYSTOLIC BLOOD PRESSURE: 132 MMHG | TEMPERATURE: 99 F | HEART RATE: 60 BPM | WEIGHT: 214 LBS | DIASTOLIC BLOOD PRESSURE: 84 MMHG

## 2018-08-17 DIAGNOSIS — N64.4 BREAST PAIN, RIGHT: Primary | ICD-10-CM

## 2018-08-17 PROCEDURE — 99213 OFFICE O/P EST LOW 20 MIN: CPT | Performed by: FAMILY MEDICINE

## 2018-08-21 NOTE — PROGRESS NOTES
Right breast pain    The pt is a 79year old female who complains of several instances of a quick sharp pain near the site of her right breast biopsy. Does SBE regularly and has not felt any difference. Last mammogram was 8/15/18.    /84   Pulse 60

## 2018-09-01 ENCOUNTER — HOSPITAL ENCOUNTER (OUTPATIENT)
Age: 68
Discharge: HOME | End: 2018-09-01
Attending: FAMILY MEDICINE
Payer: MEDICARE

## 2018-09-01 VITALS
HEART RATE: 69 BPM | TEMPERATURE: 99 F | WEIGHT: 214 LBS | SYSTOLIC BLOOD PRESSURE: 141 MMHG | HEIGHT: 64 IN | BODY MASS INDEX: 36.54 KG/M2 | DIASTOLIC BLOOD PRESSURE: 87 MMHG | OXYGEN SATURATION: 96 % | RESPIRATION RATE: 18 BRPM

## 2018-09-01 DIAGNOSIS — H57.9 LESION OF EYE: Primary | ICD-10-CM

## 2018-09-01 PROCEDURE — 99212 OFFICE O/P EST SF 10 MIN: CPT

## 2018-09-01 PROCEDURE — 99202 OFFICE O/P NEW SF 15 MIN: CPT

## 2018-09-01 NOTE — ED PROVIDER NOTES
Patient Seen in: [facility]    History   Patient presents with:   Eye Visual Problem (opthalmic)    Stated Complaint: LEFT EYE PROBLEM STARTING TODAY    19-year-old female comes in with concerns that she noticed small lesion and frequency. Skin: Negative for rash. Neurological: Negative for dizziness and headaches. Hematological: Negative for adenopathy. Positive for stated complaint: LEFT EYE PROBLEM STARTING TODAY  Other systems are as noted in HPI.   Constitution thickening of the conjunctiva, this usually is caused by frequent exposure to dust or wind, sunlight, dry eyes. Very commonly seen in middle aged people.   She denies having any symptoms with this    Reassured, explained that most  people do not have any s

## 2018-09-13 DIAGNOSIS — I10 BENIGN ESSENTIAL HYPERTENSION: ICD-10-CM

## 2018-09-13 RX ORDER — CARVEDILOL 6.25 MG/1
6.25 TABLET ORAL 2 TIMES DAILY WITH MEALS
Qty: 180 TABLET | Refills: 0 | Status: ON HOLD | OUTPATIENT
Start: 2018-09-13 | End: 2019-01-05

## 2018-09-13 NOTE — TELEPHONE ENCOUNTER
Fax rec'd from [facility] requesting carvedilol refill. We have not filled this for her but she does see you for htn. Are you OK refilling? She is seeing you next month  I have pended if you agree. DISPLAY PLAN FREE TEXT

## 2018-10-01 ENCOUNTER — PATIENT MESSAGE (OUTPATIENT)
Dept: FAMILY MEDICINE CLINIC | Facility: CLINIC | Age: 68
End: 2018-10-01

## 2018-10-02 NOTE — TELEPHONE ENCOUNTER
From: Marques Valerio  To: Tha Ellis MD  Sent: 10/1/2018 12:08 PM CDT  Subject: Non-Urgent Medical Question    I have appt with Dr Danika Webster on Thursday. Do I need to come in fasting for any blood test that morning?

## 2018-10-04 ENCOUNTER — OFFICE VISIT (OUTPATIENT)
Dept: FAMILY MEDICINE CLINIC | Facility: CLINIC | Age: 68
End: 2018-10-04
Payer: MEDICARE

## 2018-10-04 ENCOUNTER — APPOINTMENT (OUTPATIENT)
Dept: LAB | Age: 68
End: 2018-10-04
Attending: FAMILY MEDICINE
Payer: MEDICARE

## 2018-10-04 VITALS
HEART RATE: 56 BPM | WEIGHT: 212 LBS | RESPIRATION RATE: 18 BRPM | BODY MASS INDEX: 36.19 KG/M2 | SYSTOLIC BLOOD PRESSURE: 120 MMHG | HEIGHT: 64 IN | TEMPERATURE: 99 F | DIASTOLIC BLOOD PRESSURE: 78 MMHG

## 2018-10-04 DIAGNOSIS — R79.89 ELEVATED SERUM CREATININE: ICD-10-CM

## 2018-10-04 DIAGNOSIS — Z23 NEED FOR VACCINATION: ICD-10-CM

## 2018-10-04 DIAGNOSIS — E78.00 HYPERCHOLESTEREMIA: Primary | ICD-10-CM

## 2018-10-04 DIAGNOSIS — I10 BENIGN ESSENTIAL HYPERTENSION: ICD-10-CM

## 2018-10-04 DIAGNOSIS — E78.00 HYPERCHOLESTEREMIA: ICD-10-CM

## 2018-10-04 PROCEDURE — 80061 LIPID PANEL: CPT

## 2018-10-04 PROCEDURE — G0009 ADMIN PNEUMOCOCCAL VACCINE: HCPCS | Performed by: FAMILY MEDICINE

## 2018-10-04 PROCEDURE — 36415 COLL VENOUS BLD VENIPUNCTURE: CPT

## 2018-10-04 PROCEDURE — 90732 PPSV23 VACC 2 YRS+ SUBQ/IM: CPT | Performed by: FAMILY MEDICINE

## 2018-10-04 PROCEDURE — 90653 IIV ADJUVANT VACCINE IM: CPT | Performed by: FAMILY MEDICINE

## 2018-10-04 PROCEDURE — 80053 COMPREHEN METABOLIC PANEL: CPT

## 2018-10-04 PROCEDURE — 99214 OFFICE O/P EST MOD 30 MIN: CPT | Performed by: FAMILY MEDICINE

## 2018-10-04 PROCEDURE — G0008 ADMIN INFLUENZA VIRUS VAC: HCPCS | Performed by: FAMILY MEDICINE

## 2018-10-04 RX ORDER — LOSARTAN POTASSIUM AND HYDROCHLOROTHIAZIDE 25; 100 MG/1; MG/1
1 TABLET ORAL DAILY
Qty: 90 TABLET | Refills: 1 | Status: SHIPPED | OUTPATIENT
Start: 2018-10-04 | End: 2019-04-01

## 2018-10-04 NOTE — PROGRESS NOTES
HPI:   Vance Sotomayor is a 79year old female here for a recheck of her hyperlipidemia. Her last LDL cholesterol was 96 on 4/18/2018. She will have new labs drawn today.   Patient reports taking medications as instructed, no medication side effects n daily. Disp:  Rfl:    Biotin 1000 MCG Oral Tab Take 1,000 mcg by mouth daily.    Disp:  Rfl:       Past Medical History:   Diagnosis Date   • Pepper's esophagus    • Diverticulosis of large intestine    • Esophageal reflux    • High blood pressure    • Hig murmur     Pulse exam  Right:  pedal 2+, posterior tibial 2. Pulse exam  Left: pedal 2+, posterior tibial 2. GI: good BS's,no masses, HSM or tenderness  EXTREMITIES: No cyanosis clubbing or edema    ASSESSMENT AND PLAN:   1.  Benign essential hyperten

## 2018-10-22 ENCOUNTER — HOSPITAL ENCOUNTER (OUTPATIENT)
Dept: CT IMAGING | Facility: HOSPITAL | Age: 68
Discharge: HOME | End: 2018-10-22
Attending: FAMILY MEDICINE
Payer: MEDICARE

## 2018-10-22 ENCOUNTER — HOSPITAL ENCOUNTER (OUTPATIENT)
Dept: CT IMAGING | Facility: HOSPITAL | Age: 68
End: 2018-10-22
Attending: FAMILY MEDICINE
Payer: MEDICARE

## 2018-10-22 DIAGNOSIS — Z81.2 FAMILY HISTORY OF SMOKING: ICD-10-CM

## 2018-10-22 DIAGNOSIS — R79.9 ABNORMAL BLOOD CHEMISTRY: ICD-10-CM

## 2018-10-22 DIAGNOSIS — Z87.891 HISTORY OF SMOKING: ICD-10-CM

## 2018-10-22 PROCEDURE — 71250 CT THORAX DX C-: CPT | Performed by: FAMILY MEDICINE

## 2018-10-24 DIAGNOSIS — K22.70 BARRETT'S ESOPHAGUS WITHOUT DYSPLASIA: ICD-10-CM

## 2018-10-24 RX ORDER — RANITIDINE 150 MG/1
CAPSULE ORAL
Qty: 180 CAPSULE | Refills: 0 | Status: SHIPPED | OUTPATIENT
Start: 2018-10-24 | End: 2019-01-20

## 2018-11-03 DIAGNOSIS — E78.00 HYPERCHOLESTEREMIA: ICD-10-CM

## 2018-11-05 RX ORDER — ROSUVASTATIN CALCIUM 40 MG/1
TABLET, COATED ORAL
Qty: 90 TABLET | Refills: 1 | Status: SHIPPED | OUTPATIENT
Start: 2018-11-05 | End: 2019-01-03

## 2018-12-03 ENCOUNTER — OFFICE VISIT (OUTPATIENT)
Dept: FAMILY MEDICINE CLINIC | Facility: CLINIC | Age: 68
End: 2018-12-03
Payer: MEDICARE

## 2018-12-03 VITALS
BODY MASS INDEX: 36.37 KG/M2 | RESPIRATION RATE: 14 BRPM | WEIGHT: 213 LBS | DIASTOLIC BLOOD PRESSURE: 80 MMHG | SYSTOLIC BLOOD PRESSURE: 144 MMHG | HEIGHT: 64 IN | HEART RATE: 72 BPM

## 2018-12-03 DIAGNOSIS — N95.1 FLUSHING, MENOPAUSAL: ICD-10-CM

## 2018-12-03 DIAGNOSIS — G47.33 OSA ON CPAP: ICD-10-CM

## 2018-12-03 DIAGNOSIS — Z11.59 NEED FOR HEPATITIS C SCREENING TEST: ICD-10-CM

## 2018-12-03 DIAGNOSIS — Z00.00 ENCOUNTER FOR ANNUAL HEALTH EXAMINATION: Primary | ICD-10-CM

## 2018-12-03 DIAGNOSIS — E78.00 HYPERCHOLESTEREMIA: ICD-10-CM

## 2018-12-03 DIAGNOSIS — N28.9 RENAL INSUFFICIENCY: ICD-10-CM

## 2018-12-03 DIAGNOSIS — I10 BENIGN ESSENTIAL HYPERTENSION: ICD-10-CM

## 2018-12-03 DIAGNOSIS — Z78.0 POSTMENOPAUSAL: ICD-10-CM

## 2018-12-03 DIAGNOSIS — Z99.89 OSA ON CPAP: ICD-10-CM

## 2018-12-03 PROCEDURE — G0438 PPPS, INITIAL VISIT: HCPCS | Performed by: FAMILY MEDICINE

## 2018-12-03 PROCEDURE — 99213 OFFICE O/P EST LOW 20 MIN: CPT | Performed by: FAMILY MEDICINE

## 2018-12-03 RX ORDER — ESTRADIOL 1 MG/1
TABLET ORAL
Qty: 90 TABLET | Refills: 1 | Status: SHIPPED | OUTPATIENT
Start: 2018-12-03 | End: 2019-06-01

## 2018-12-03 NOTE — PATIENT INSTRUCTIONS
Valentin Valerio's SCREENING SCHEDULE   Tests on this list are recommended by your physician but may not be covered, or covered at this frequency, by your insurer. Please check with your insurance carrier before scheduling to verify coverage.    PREVEN more often if abnormal Colonoscopy due on 12/11/2022 Update Health Maintenance if applicable    Flex Sigmoidoscopy Screen  Covered every 5 years No results found for this or any previous visit. No flowsheet data found.      Fecal Occult Blood   Covered Rosa Once after 65 Orders placed or performed in visit on 10/04/18   • PNEUMOCOCCAL IMM (PNEUMOVAX)   Orders placed or performed in visit on 11/03/17   • PNEUMOCOCCAL IMM (PNEUMOVAX)    Please get once after your 65th birthday    Hepatitis B for Moderate/High R

## 2018-12-03 NOTE — PROGRESS NOTES
HPI:   Meghana Perla is a 79year old female who presents for a Medicare Subsequent Annual Wellness visit (Pt already had Initial Annual Wellness). Patient states that overall she has felt well.   She did see Dr. Sp Miller in regard to her right kn dyes]; shellfish-derived products; sulfa antibiotics; aspirin; and atorvastatin.     CURRENT MEDICATIONS:     Outpatient Medications Marked as Taking for the 12/3/18 encounter (Office Visit) with Wai Smith MD:  estradiol 1 MG Oral Tab TAKE 1 TABLE exertion  GI: denies abdominal pain, denies heartburn  : denies dysuria, vaginal discharge or itching, no complaint of urinary incontinence   MUSCULOSKELETAL: denies back pain  NEURO: denies headaches  PSYCHE: denies depression or anxiety  HEMATOLOGIC: d Date(s) Administered   • Depo-Medrol 40mg Inj 08/22/2017   • Fluad High dose 72 yr and older (12847) 10/12/2017   • Hyaluronan Or Derivative, Monovisc 10/16/2017       ASSESSMENT AND OTHER RELEVANT CHRONIC CONDITIONS:   Fortunato Monteiro is a 79year old do not have it in Epic, and patient is instructed to get our office a copy of it for scanning into Epic           PLAN:  The patient indicates understanding of these issues and agrees to the plan. Return in 6 months (on 6/3/2019).      Amarilys Hay affect your day to day activities?: (P) 0-No     Have you had any memory issues?: (P) 0-No    Fall/Risk Scoring: (P) 4          Depression Screening (PHQ-2/PHQ-9): Over the LAST 2 WEEKS   Little interest or pleasure in doing things (over the last two weeks Bone Density Screening      Dexascan Every two years Last Dexa Scan:   XR DEXA BONE DENSITOMETRY (CPT=77080) 11/17/2017    No flowsheet data found.     Pap and Pelvic      Pap: Every 3 yrs age 21-65 or Pap+HPV every 5 yrs age 33-67, age 72 and older at hi Creatinine  Annually Creatinine (mg/dL)   Date Value   10/04/2018 1.10 (H)    No flowsheet data found. BUN  Annually BUN (mg/dL)   Date Value   10/04/2018 16    No flowsheet data found.      Drug Serum Conc  Annually No results found for: DIGOXIN, DIG

## 2018-12-27 ENCOUNTER — PATIENT MESSAGE (OUTPATIENT)
Dept: FAMILY MEDICINE CLINIC | Facility: CLINIC | Age: 68
End: 2018-12-27

## 2018-12-31 NOTE — PROGRESS NOTES
40 mg of simvastatin would not be the equivalent of 40 mg of rosuvastatin. I might suggest is to try 80 mg of atorvastatin which would be the equivalent. Atorvastatin is free through [employer].

## 2018-12-31 NOTE — TELEPHONE ENCOUNTER
From: Michele Valerio  To: Ashok Russo MD  Sent: 12/27/2018 8:34 PM CST  Subject: Prescription Question    My prescription plan will have a change after the first of the year. I would like to know if Dr Annemarie Fabry can change my Rosuvastatin 40 mg

## 2019-01-03 RX ORDER — ATORVASTATIN CALCIUM 80 MG/1
80 TABLET, FILM COATED ORAL NIGHTLY
Qty: 90 TABLET | Refills: 1 | Status: ON HOLD | OUTPATIENT
Start: 2019-01-03 | End: 2019-01-05

## 2019-01-04 ENCOUNTER — APPOINTMENT (OUTPATIENT)
Dept: CT IMAGING | Facility: HOSPITAL | Age: 69
End: 2019-01-04
Attending: EMERGENCY MEDICINE
Payer: MEDICARE

## 2019-01-04 ENCOUNTER — HOSPITAL ENCOUNTER (OUTPATIENT)
Facility: HOSPITAL | Age: 69
Setting detail: OBSERVATION
Discharge: HOME | End: 2019-01-06
Attending: EMERGENCY MEDICINE | Admitting: INTERNAL MEDICINE
Payer: MEDICARE

## 2019-01-04 DIAGNOSIS — R00.1 BRADYCARDIA: ICD-10-CM

## 2019-01-04 DIAGNOSIS — R42 VERTIGO: Primary | ICD-10-CM

## 2019-01-04 PROBLEM — I10 BENIGN ESSENTIAL HTN: Status: ACTIVE | Noted: 2017-08-02

## 2019-01-04 LAB
ANION GAP SERPL CALC-SCNC: 7 MMOL/L (ref 0–18)
ATRIAL RATE: 45 BPM
BASOPHILS # BLD AUTO: 0.03 X10(3) UL (ref 0–0.1)
BASOPHILS NFR BLD AUTO: 0.3 %
BUN BLD-MCNC: 22 MG/DL (ref 8–20)
BUN/CREAT SERPL: 19.1 (ref 10–20)
CALCIUM BLD-MCNC: 8.9 MG/DL (ref 8.3–10.3)
CHLORIDE SERPL-SCNC: 104 MMOL/L (ref 101–111)
CO2 SERPL-SCNC: 26 MMOL/L (ref 22–32)
CREAT BLD-MCNC: 1.15 MG/DL (ref 0.55–1.02)
EOSINOPHIL # BLD AUTO: 0.22 X10(3) UL (ref 0–0.3)
EOSINOPHIL NFR BLD AUTO: 2.5 %
ERYTHROCYTE [DISTWIDTH] IN BLOOD BY AUTOMATED COUNT: 19.1 % (ref 11.5–16)
GLUCOSE BLD-MCNC: 175 MG/DL (ref 70–99)
HCT VFR BLD AUTO: 34.7 % (ref 34–50)
HGB BLD-MCNC: 11.1 G/DL (ref 12–16)
IMMATURE GRANULOCYTE COUNT: 0.02 X10(3) UL (ref 0–1)
IMMATURE GRANULOCYTE RATIO %: 0.2 %
LYMPHOCYTES # BLD AUTO: 2.24 X10(3) UL (ref 0.9–4)
LYMPHOCYTES NFR BLD AUTO: 25.5 %
MCH RBC QN AUTO: 25.8 PG (ref 27–33.2)
MCHC RBC AUTO-ENTMCNC: 32 G/DL (ref 31–37)
MCV RBC AUTO: 80.5 FL (ref 81–100)
MONOCYTES # BLD AUTO: 0.93 X10(3) UL (ref 0.1–1)
MONOCYTES NFR BLD AUTO: 10.6 %
NEUTROPHIL ABS PRELIM: 5.33 X10 (3) UL (ref 1.3–6.7)
NEUTROPHILS # BLD AUTO: 5.33 X10(3) UL (ref 1.3–6.7)
NEUTROPHILS NFR BLD AUTO: 60.9 %
OSMOLALITY SERPL CALC.SUM OF ELEC: 292 MOSM/KG (ref 275–295)
P AXIS: 26 DEGREES
P-R INTERVAL: 200 MS
PLATELET # BLD AUTO: 315 10(3)UL (ref 150–450)
POTASSIUM SERPL-SCNC: 3.1 MMOL/L (ref 3.6–5.1)
Q-T INTERVAL: 498 MS
QRS DURATION: 98 MS
QTC CALCULATION (BEZET): 430 MS
R AXIS: 27 DEGREES
RBC # BLD AUTO: 4.31 X10(6)UL (ref 3.8–5.1)
RED CELL DISTRIBUTION WIDTH-SD: 55.8 FL (ref 35.1–46.3)
SODIUM SERPL-SCNC: 137 MMOL/L (ref 136–144)
T AXIS: 36 DEGREES
TROPONIN I SERPL-MCNC: <0.046 NG/ML (ref ?–0.05)
VENTRICULAR RATE: 45 BPM
WBC # BLD AUTO: 8.8 X10(3) UL (ref 4–13)

## 2019-01-04 PROCEDURE — 99220 INITIAL OBSERVATION CARE,LEVL III: CPT | Performed by: INTERNAL MEDICINE

## 2019-01-04 PROCEDURE — 70450 CT HEAD/BRAIN W/O DYE: CPT | Performed by: EMERGENCY MEDICINE

## 2019-01-04 RX ORDER — MECLIZINE HCL 12.5 MG/1
12.5 TABLET ORAL 3 TIMES DAILY PRN
Status: DISCONTINUED | OUTPATIENT
Start: 2019-01-04 | End: 2019-01-06

## 2019-01-04 RX ORDER — ONDANSETRON 2 MG/ML
4 INJECTION INTRAMUSCULAR; INTRAVENOUS ONCE
Status: COMPLETED | OUTPATIENT
Start: 2019-01-04 | End: 2019-01-04

## 2019-01-04 RX ORDER — ONDANSETRON 2 MG/ML
8 INJECTION INTRAMUSCULAR; INTRAVENOUS ONCE
Status: COMPLETED | OUTPATIENT
Start: 2019-01-04 | End: 2019-01-05

## 2019-01-04 RX ORDER — POTASSIUM CHLORIDE 14.9 MG/ML
20 INJECTION INTRAVENOUS ONCE
Status: COMPLETED | OUTPATIENT
Start: 2019-01-04 | End: 2019-01-04

## 2019-01-04 RX ORDER — BIOTIN 1 MG
1000 TABLET ORAL DAILY
Status: DISCONTINUED | OUTPATIENT
Start: 2019-01-04 | End: 2019-01-04 | Stop reason: RX

## 2019-01-04 RX ORDER — DIPHENHYDRAMINE HCL 50 MG
50 CAPSULE ORAL ONCE
Status: COMPLETED | OUTPATIENT
Start: 2019-01-05 | End: 2019-01-05

## 2019-01-04 RX ORDER — ROSUVASTATIN CALCIUM 40 MG/1
40 TABLET, COATED ORAL NIGHTLY
COMMUNITY
End: 2019-04-29 | Stop reason: SDUPTHER

## 2019-01-04 RX ORDER — FAMOTIDINE 20 MG/1
20 TABLET ORAL DAILY
Status: DISCONTINUED | OUTPATIENT
Start: 2019-01-04 | End: 2019-01-04

## 2019-01-04 RX ORDER — DIAZEPAM 5 MG/ML
5 INJECTION, SOLUTION INTRAMUSCULAR; INTRAVENOUS ONCE
Status: COMPLETED | OUTPATIENT
Start: 2019-01-04 | End: 2019-01-05

## 2019-01-04 RX ORDER — DIPHENHYDRAMINE HCL 50 MG
50 CAPSULE ORAL ONCE
Status: DISCONTINUED | OUTPATIENT
Start: 2019-01-04 | End: 2019-01-04

## 2019-01-04 RX ORDER — METOCLOPRAMIDE HYDROCHLORIDE 5 MG/ML
10 INJECTION INTRAMUSCULAR; INTRAVENOUS EVERY 8 HOURS PRN
Status: DISCONTINUED | OUTPATIENT
Start: 2019-01-04 | End: 2019-01-06

## 2019-01-04 RX ORDER — SODIUM CHLORIDE 9 MG/ML
125 INJECTION, SOLUTION INTRAVENOUS CONTINUOUS
Status: DISCONTINUED | OUTPATIENT
Start: 2019-01-04 | End: 2019-01-06

## 2019-01-04 RX ORDER — HEPARIN SODIUM 5000 [USP'U]/ML
5000 INJECTION, SOLUTION INTRAVENOUS; SUBCUTANEOUS EVERY 8 HOURS SCHEDULED
Status: DISCONTINUED | OUTPATIENT
Start: 2019-01-04 | End: 2019-01-06

## 2019-01-04 RX ORDER — ACETAMINOPHEN 325 MG/1
650 TABLET ORAL EVERY 6 HOURS PRN
Status: DISCONTINUED | OUTPATIENT
Start: 2019-01-04 | End: 2019-01-06

## 2019-01-04 RX ORDER — LORAZEPAM 2 MG/ML
1 INJECTION INTRAMUSCULAR ONCE
Status: COMPLETED | OUTPATIENT
Start: 2019-01-04 | End: 2019-01-04

## 2019-01-04 RX ORDER — ATORVASTATIN CALCIUM 80 MG/1
80 TABLET, FILM COATED ORAL NIGHTLY
Status: DISCONTINUED | OUTPATIENT
Start: 2019-01-04 | End: 2019-01-06

## 2019-01-04 RX ORDER — MECLIZINE HYDROCHLORIDE 25 MG/1
25 TABLET ORAL ONCE
Status: COMPLETED | OUTPATIENT
Start: 2019-01-04 | End: 2019-01-04

## 2019-01-04 RX ORDER — SODIUM CHLORIDE 9 MG/ML
INJECTION, SOLUTION INTRAVENOUS CONTINUOUS
Status: DISCONTINUED | OUTPATIENT
Start: 2019-01-04 | End: 2019-01-06

## 2019-01-04 RX ORDER — DIPHENHYDRAMINE HYDROCHLORIDE 50 MG/ML
25 INJECTION INTRAMUSCULAR; INTRAVENOUS ONCE
Status: COMPLETED | OUTPATIENT
Start: 2019-01-04 | End: 2019-01-04

## 2019-01-04 RX ORDER — ESTRADIOL 1 MG/1
1 TABLET ORAL DAILY
Status: DISCONTINUED | OUTPATIENT
Start: 2019-01-04 | End: 2019-01-06

## 2019-01-04 RX ORDER — ONDANSETRON 2 MG/ML
4 INJECTION INTRAMUSCULAR; INTRAVENOUS EVERY 6 HOURS PRN
Status: DISCONTINUED | OUTPATIENT
Start: 2019-01-04 | End: 2019-01-06

## 2019-01-04 RX ORDER — FAMOTIDINE 20 MG/1
20 TABLET ORAL DAILY
Status: DISCONTINUED | OUTPATIENT
Start: 2019-01-04 | End: 2019-01-06

## 2019-01-04 NOTE — ED NOTES
Pt states, \"I got a little better but when I move, I still feel dizzy josie moving in CAT Scan. \" Pt in no acute distress, seen dozing on and off

## 2019-01-04 NOTE — PROGRESS NOTES
Pharmacy Note: Dietary Supplement Discontinuation Per Policy    Biotin has been discontinued on [employer] per policy. This supplement may be restarted upon discharge using the medication reconciliation process.     Thank you,   Jose Feliciano,

## 2019-01-04 NOTE — PROGRESS NOTES
Pt seen and examined. She is still having vertigo, c/w with BPPV. Will get MRI/MRA brain due to her occasional HA as well. Cont current management . Patrick pt and BISI.     Lexx Arriola MD  [facility]

## 2019-01-04 NOTE — ED INITIAL ASSESSMENT (HPI)
Arrived via EMS for c/o dizziness on waking up at 0300 with n/v. Pt denies HA, denies CP, denies YANIQUE, denies visual changes

## 2019-01-04 NOTE — PROGRESS NOTES
[facility] Pharmacy Note: Renal dose adjustment for Famotidine (Pepcid)  Yaya Enciso has been prescribed Famotidine (Pepcid) 20 mg every 12 hours. Estimated Creatinine Clearance: 41.6 mL/min (A) (based on SCr of 1.15 mg/dL (H)).     Her calculate

## 2019-01-04 NOTE — ED PROVIDER NOTES
Patient Seen in: [facility] Emergency Department    History   Patient presents with:  Dizziness (neurologic)    Stated Complaint: arrived via EMS for c/o dizziness    HPI    Patient is a 61-year-old female, medical history no below, here for intense for c/o dizziness  Other systems are as noted in HPI. Constitutional and vital signs reviewed. All other systems reviewed and negative except as noted above.     Physical Exam     ED Triage Vitals [01/04/19 0447]   BP (!) 185/92   Pulse (!) 44   Resp Potassium 3.1 (*)     BUN 22 (*)     Creatinine 1.15 (*)     GFR, Non- 50 (*)     GFR, -American 57 (*)     All other components within normal limits   CBC W/ DIFFERENTIAL - Abnormal; Notable for the following components:    HGB 1 Prescribed:  Current Discharge Medication List        Present on Admission  Date Reviewed: 12/3/2018          ICD-10-CM Noted POA    Vertigo R42 1/4/2019 Unknown

## 2019-01-04 NOTE — H&P
[facility]  History and Physical     Tanesha Ugaldewitt Patient Status:  Emergency    1950 MRN [id number]   [address] Attending Pham Fletcher MD   [facility] Day # 0 PCP Amarilys Hay MD     Chief Complain of Onset   • Heart Disorder Father         MI   • Cancer Mother         Brain and lung cancer   • Heart Disorder Maternal Grandmother    • Heart Disorder Maternal Grandfather    • Heart Disorder Paternal Grandfather         MI   • Heart Disorder Brother tenderness or deformity. Abdomen: Soft, nontender, nondistended. Positive bowel sounds. No rebound, guarding or organomegaly. Neurologic: No focal neurological deficits. CNII-XII grossly intact. Musculoskeletal: Moves all extremities.   Extremities: No

## 2019-01-05 ENCOUNTER — APPOINTMENT (OUTPATIENT)
Dept: MRI IMAGING | Facility: HOSPITAL | Age: 69
End: 2019-01-05
Attending: INTERNAL MEDICINE
Payer: MEDICARE

## 2019-01-05 PROCEDURE — 70549 MR ANGIOGRAPH NECK W/O&W/DYE: CPT | Performed by: INTERNAL MEDICINE

## 2019-01-05 PROCEDURE — 99225 SUBSEQUENT OBSERVATION CARE: CPT | Performed by: INTERNAL MEDICINE

## 2019-01-05 PROCEDURE — 70553 MRI BRAIN STEM W/O & W/DYE: CPT | Performed by: INTERNAL MEDICINE

## 2019-01-05 PROCEDURE — 70546 MR ANGIOGRAPH HEAD W/O&W/DYE: CPT | Performed by: INTERNAL MEDICINE

## 2019-01-05 RX ORDER — AMLODIPINE BESYLATE 5 MG/1
5 TABLET ORAL DAILY
Qty: 30 TABLET | Refills: 0 | Status: SHIPPED | OUTPATIENT
Start: 2019-01-05 | End: 2019-01-06

## 2019-01-05 RX ORDER — MECLIZINE HCL 12.5 MG/1
12.5 TABLET ORAL 3 TIMES DAILY PRN
Qty: 30 TABLET | Refills: 0 | Status: SHIPPED | OUTPATIENT
Start: 2019-01-05 | End: 2019-01-10

## 2019-01-05 RX ORDER — HYDRALAZINE HYDROCHLORIDE 20 MG/ML
10 INJECTION INTRAMUSCULAR; INTRAVENOUS EVERY 4 HOURS PRN
Status: DISCONTINUED | OUTPATIENT
Start: 2019-01-05 | End: 2019-01-06

## 2019-01-05 RX ORDER — DIAZEPAM 5 MG/1
5 TABLET ORAL 3 TIMES DAILY PRN
Qty: 10 TABLET | Refills: 0 | Status: SHIPPED | OUTPATIENT
Start: 2019-01-05 | End: 2019-06-04 | Stop reason: ALTCHOICE

## 2019-01-05 RX ORDER — AMLODIPINE BESYLATE 5 MG/1
10 TABLET ORAL DAILY
Status: DISCONTINUED | OUTPATIENT
Start: 2019-01-05 | End: 2019-01-06

## 2019-01-05 RX ORDER — FAMOTIDINE 20 MG/1
20 TABLET ORAL ONCE
Status: COMPLETED | OUTPATIENT
Start: 2019-01-05 | End: 2019-01-05

## 2019-01-05 NOTE — PLAN OF CARE
METABOLIC/FLUID AND ELECTROLYTES - ADULT    • Electrolytes maintained within normal limits Not Progressing        Patient/Family Goals    • Patient/Family Long Term Goal Not Progressing    • Patient/Family Short Term Goal Not Progressing            Pt is a

## 2019-01-05 NOTE — RESPIRATORY THERAPY NOTE
ISIS : EQUIPMENT USE: DAILY SUMMARY                                            SET MODE: CPAP                                          USAGE IN HOURS: 5:14                                          90% EXP. PRESSURE(E

## 2019-01-05 NOTE — PROGRESS NOTES
Assumed care of patient at 19:30. States that headache has approved. Patient given Ice pack. Patient had concerns about Pepcid not being scheduled BID. Hospitalist on call page, order received to increase Pepcid to BID.  Pharmacy called to inform RN that Pe

## 2019-01-05 NOTE — PHYSICAL THERAPY NOTE
PHYSICAL THERAPY EVALUATION - INPATIENT     Room Number: 8500/1561-C  Evaluation Date: 1/5/2019  Type of Evaluation: Initial  Physician Order: PT Eval and Treat    Presenting Problem: vertigo  Reason for Therapy: Mobility Dysfunction and Discharge Pl Regularly Uses: Reading glasses    Prior Level of Kle[place]: independent with ADLs and iADLs without AD, going to water aerobics multiple times per week    SUBJECTIVE  \"I notice it more when I'm looking at her to the R side\"    Patient self-stated goa the bed?: A Little   How much help from another person does the patient currently need. ..   -   Moving to and from a bed to a chair (including a wheelchair)?: A Little   -   Need to walk in hospital room?: A Little   -   Climbing 3-5 steps with a railing?: Functional outcome measures completed include AMPAC \"6 clicks\" Inpatient Basic Mobility Form. Based on this evaluation, patient's clinical presentation is evolving and overall the evaluation complexity is considered moderate.   These impairments and pancho

## 2019-01-05 NOTE — PLAN OF CARE
Impaired Functional Mobility    • Achieve highest/safest level of mobility/gait Progressing        METABOLIC/FLUID AND ELECTROLYTES - ADULT    • Electrolytes maintained within normal limits Progressing        Patient/Family Goals    • Patient/Family Long T

## 2019-01-05 NOTE — PROGRESS NOTES
[facility]  Progress Note     Fernando Vaelrio Patient Status:  Observation    1950 MRN [id number]   [facility] 3NE-A Attending Jenni Barksdale MD   Hosp Day # 0 PCP Rosina Barr MD     Chief Complaint: vertigo    S: Kianna Moder today  2. Bradycardia- resolved  1. Monitor on tele  2. Hold BB  3. Essential HTN  1. Continue home meds except for BB; can add amlodipine and stop BB  4. Dyslipidemia  1. statin  5. ISIS  6.  Hx of DEMETRI     Quality:  · DVT Prophylaxis: Heparin  · CODE status

## 2019-01-05 NOTE — PLAN OF CARE
METABOLIC/FLUID AND ELECTROLYTES - ADULT    • Electrolytes maintained within normal limits Progressing        Patient/Family Goals    • Patient/Family Long Term Goal Progressing    • Patient/Family Short Term Goal Progressing          Assumed care at 2200.

## 2019-01-05 NOTE — DISCHARGE SUMMARY
[facility]  DISCHARGE SUMMARY     Sandip Echavarriatt Patient Status:  Observation    1950 MRN [id number]   [facility] 3NE-A Attending No att. providers found   Hosp Day # 0 PCP Emmanuelle Hendrickson MD     Date of Admission:  with TCC and PCP clinic post discharged. Lace+ Score: 38  59-90 High Risk  29-58 Medium Risk  0-28   Low Risk. TCM Follow-Up Recommendation:  LACE 29-58:  Moderate Risk of readmission after discharge from the hospital.    Discharge Medication List: Tabs  Commonly known as:  COREG              Where to Get Your Medications      Please  your prescriptions at the location directed by your doctor or nurse    Bring a paper prescription for each of these medications  · diazepam 5 MG Tabs  · hydrALAz

## 2019-01-06 VITALS
WEIGHT: 212 LBS | DIASTOLIC BLOOD PRESSURE: 89 MMHG | OXYGEN SATURATION: 94 % | BODY MASS INDEX: 36.19 KG/M2 | HEIGHT: 64 IN | HEART RATE: 59 BPM | TEMPERATURE: 98 F | RESPIRATION RATE: 18 BRPM | SYSTOLIC BLOOD PRESSURE: 163 MMHG

## 2019-01-06 PROCEDURE — 99217 OBSERVATION CARE DISCHARGE: CPT | Performed by: INTERNAL MEDICINE

## 2019-01-06 RX ORDER — HYDRALAZINE HYDROCHLORIDE 50 MG/1
50 TABLET, FILM COATED ORAL 2 TIMES DAILY
Status: DISCONTINUED | OUTPATIENT
Start: 2019-01-06 | End: 2019-01-06

## 2019-01-06 RX ORDER — HYDROCHLOROTHIAZIDE 25 MG/1
50 TABLET ORAL DAILY
Status: DISCONTINUED | OUTPATIENT
Start: 2019-01-06 | End: 2019-01-06

## 2019-01-06 RX ORDER — HYDRALAZINE HYDROCHLORIDE 50 MG/1
50 TABLET, FILM COATED ORAL 2 TIMES DAILY
Qty: 60 TABLET | Refills: 0 | Status: SHIPPED | OUTPATIENT
Start: 2019-01-06 | End: 2019-06-04 | Stop reason: ALTCHOICE

## 2019-01-06 RX ORDER — BUTALBITAL, ACETAMINOPHEN AND CAFFEINE 50; 325; 40 MG/1; MG/1; MG/1
1 TABLET ORAL EVERY 4 HOURS PRN
Status: DISCONTINUED | OUTPATIENT
Start: 2019-01-06 | End: 2019-01-06

## 2019-01-06 RX ORDER — ONDANSETRON 4 MG/1
4 TABLET, FILM COATED ORAL EVERY 8 HOURS PRN
Qty: 20 TABLET | Refills: 0 | Status: SHIPPED | OUTPATIENT
Start: 2019-01-06 | End: 2019-08-20 | Stop reason: ALTCHOICE

## 2019-01-06 RX ORDER — TRAMADOL HYDROCHLORIDE 50 MG/1
50 TABLET ORAL EVERY 6 HOURS PRN
Status: DISCONTINUED | OUTPATIENT
Start: 2019-01-06 | End: 2019-01-06

## 2019-01-06 RX ORDER — HYDROCODONE BITARTRATE AND ACETAMINOPHEN 5; 325 MG/1; MG/1
1 TABLET ORAL EVERY 6 HOURS PRN
Status: DISCONTINUED | OUTPATIENT
Start: 2019-01-06 | End: 2019-01-06

## 2019-01-06 RX ORDER — LOSARTAN POTASSIUM 100 MG/1
100 TABLET ORAL DAILY
Status: DISCONTINUED | OUTPATIENT
Start: 2019-01-07 | End: 2019-01-06

## 2019-01-06 NOTE — PROGRESS NOTES
Assumed care at 1630. A/O x4. VSS. NSR on tele.  RA.  PRN BP meds on MAR: monitoring for hypertension  Up ad dante  IVF infusing  Patient has no complaints at this time  Will continue to monitor

## 2019-01-06 NOTE — PLAN OF CARE
Patient reports that dizziness has improved; just now feels \"groggy\"  Continued hypertension overnight; administered PRN hydralazine per [facility]  Needs met; will continue to monitor    Impaired Functional Mobility    • Achieve highest/safest level of mobilit

## 2019-01-06 NOTE — PROGRESS NOTES
NURSING DISCHARGE NOTE    Discharged Home via Wheelchair. Accompanied by Family member and RN  Belongings Taken by patient/family. Pt assessed and ready for discharge.   Instructions and medications reviewed with patient and all questions and concer

## 2019-01-08 ENCOUNTER — PATIENT OUTREACH (OUTPATIENT)
Dept: CASE MANAGEMENT | Age: 69
End: 2019-01-08

## 2019-01-08 ENCOUNTER — OFFICE VISIT (OUTPATIENT)
Dept: INTERNAL MEDICINE CLINIC | Facility: CLINIC | Age: 69
End: 2019-01-08
Payer: MEDICARE

## 2019-01-08 VITALS
OXYGEN SATURATION: 96 % | SYSTOLIC BLOOD PRESSURE: 110 MMHG | RESPIRATION RATE: 16 BRPM | BODY MASS INDEX: 36.34 KG/M2 | HEIGHT: 64 IN | TEMPERATURE: 98 F | DIASTOLIC BLOOD PRESSURE: 72 MMHG | HEART RATE: 82 BPM | WEIGHT: 212.88 LBS

## 2019-01-08 DIAGNOSIS — K59.09 OTHER CONSTIPATION: ICD-10-CM

## 2019-01-08 DIAGNOSIS — Z02.9 ENCOUNTERS FOR UNSPECIFIED ADMINISTRATIVE PURPOSE: ICD-10-CM

## 2019-01-08 DIAGNOSIS — R00.1 BRADYCARDIA: ICD-10-CM

## 2019-01-08 DIAGNOSIS — K59.00 CONSTIPATION, UNSPECIFIED CONSTIPATION TYPE: ICD-10-CM

## 2019-01-08 DIAGNOSIS — Z09 HOSPITAL DISCHARGE FOLLOW-UP: ICD-10-CM

## 2019-01-08 DIAGNOSIS — R42 VERTIGO: Primary | ICD-10-CM

## 2019-01-08 DIAGNOSIS — I10 BENIGN ESSENTIAL HTN: ICD-10-CM

## 2019-01-08 DIAGNOSIS — R42 VERTIGO: ICD-10-CM

## 2019-01-08 PROCEDURE — 1111F DSCHRG MED/CURRENT MED MERGE: CPT | Performed by: CLINICAL NURSE SPECIALIST

## 2019-01-08 PROCEDURE — 1111F DSCHRG MED/CURRENT MED MERGE: CPT

## 2019-01-08 PROCEDURE — 99495 TRANSJ CARE MGMT MOD F2F 14D: CPT | Performed by: CLINICAL NURSE SPECIALIST

## 2019-01-08 NOTE — PROGRESS NOTES
TR[facility]ITIONAL [facility]ARE CLINIC PHARMACIST MEDICATION RECONCILIATION        [facility] MRN [id number]    1950 PCP Karan Alvarado MD       Comments: Medication history completed in [address] [place] [address] by pharmacist with patient.  Reviewed m Stressed importance of taking medication consistently everyday to get the most benefit. Patient reports not having a bowel movement since hospital discharge and has been using ondansetron PRN.  Discussed with APN and patient instructed to take senna + docus

## 2019-01-08 NOTE — PROGRESS NOTES
Initial Post Discharge Follow Up   Discharge Date: 1/6/19  Contact Date: 1/8/2019    Consent Verification:  Assessment Completed With: Patient  HIPAA Verified?   Yes    Discharge Dx:   Vertigo, bradycardia, dizziness, HTN, HA    General:   • How have you Tab Take 1 tablet (12.5 mg total) by mouth 3 (three) times daily as needed for Dizziness. Disp: 30 tablet Rfl: 0   diazepam 5 MG Oral Tab Take 1 tablet (5 mg total) by mouth 3 (three) times daily as needed for Anxiety (dizziness).  Disp: 10 tablet Rfl: 0 9:00 AM CDT Exam - Established Patient with MD Radha Cooper, Malika, [facility] ([address])        [address], [place], [place] [address], [address]  [place]

## 2019-01-08 NOTE — PATIENT INSTRUCTIONS
PATIENT INSTRUCTIONS:    1. Take Senekot for constipation. You can purchase it over the counter.   2. Call the [address] [place] [address] on 1/9/19 regarding your constipation    PATIENT EDUCATION:       Dizziness (Vertigo) and Balance Problems: Staying Safe drive until symptoms have gone away. In some cases, your license may be temporarily held until it's safe for you to drive again. For safety:  · Ask a friend to drive for you. · Take public transportation. · Walk to stores and other places when you can.

## 2019-01-09 NOTE — PROGRESS NOTES
Honorio Sinha 6      HISTORY   CHIEF COMPLAINT: \"I am still feeling a little dizzy but it's improving. \"  HPI: Yaya Enciso is a 79year old female here today for follow up after being hospitalized for vertigo. mg total) by mouth every 8 (eight) hours as needed for Nausea. Disp: 20 tablet Rfl: 0   Meclizine HCl 12.5 MG Oral Tab Take 1 tablet (12.5 mg total) by mouth 3 (three) times daily as needed for Dizziness.  Disp: 30 tablet Rfl: 0   Rosuvastatin Calcium 40 MG MI   • Cancer Mother         Brain and lung cancer   • Heart Disorder Maternal Grandmother    • Heart Disorder Maternal Grandfather    • Heart Disorder Paternal Grandfather         MI   • Heart Disorder Brother    • Breast Cancer Maternal Aunt 76      Soci hemorrhoids, nausea, vomiting   MUSCULOSKELETAL: denies pain, numbness or tingling of extremeties, states normal range of motion of extremities  NEUROLOGIC: denies confusion, reports balance difficulty, denies further headache, memory loss, migraines, repo Dizziness. Disp: 30 tablet Rfl: 0   Rosuvastatin Calcium 40 MG Oral Tab Take 40 mg by mouth nightly.  Disp:  Rfl:    estradiol 1 MG Oral Tab TAKE 1 TABLET(1 MG) BY MOUTH DAILY Disp: 90 tablet Rfl: 1   RANITIDINE  MG Oral Cap TAKE 1 CAPSULE(150 MG) BY Dictated by: Ebonie Rivera MD on 1/04/2019 at 7:10     Approved by: Ebonie Rivera MD            Mri Brain Mra Head+mra Neck (all W+wo) (cpt=[id number])    Result Date: 1/5/2019  CONCLUSION:  No restricted diffusion to suggest acute ischemi of Possible Diagnoses and/or Management Options: moderate  · Amount and/or Complexity of Data to Be Reviewed: moderate  · Risk of Significant Complications, Morbidity, and/or Mortality: moderate    Overall Risk:   moderate    Patient seen within 7 days fro

## 2019-01-10 ENCOUNTER — TELEPHONE (OUTPATIENT)
Dept: INTERNAL MEDICINE CLINIC | Facility: CLINIC | Age: 69
End: 2019-01-10

## 2019-01-10 ENCOUNTER — OFFICE VISIT (OUTPATIENT)
Dept: FAMILY MEDICINE CLINIC | Facility: CLINIC | Age: 69
End: 2019-01-10
Payer: MEDICARE

## 2019-01-10 VITALS
WEIGHT: 212 LBS | TEMPERATURE: 98 F | HEART RATE: 80 BPM | SYSTOLIC BLOOD PRESSURE: 134 MMHG | DIASTOLIC BLOOD PRESSURE: 86 MMHG | HEIGHT: 64 IN | BODY MASS INDEX: 36.19 KG/M2

## 2019-01-10 DIAGNOSIS — R42 VERTIGO: Primary | ICD-10-CM

## 2019-01-10 PROCEDURE — 99214 OFFICE O/P EST MOD 30 MIN: CPT | Performed by: FAMILY MEDICINE

## 2019-01-10 RX ORDER — MECLIZINE HCL 12.5 MG/1
12.5 TABLET ORAL 3 TIMES DAILY PRN
Qty: 30 TABLET | Refills: 0 | Status: SHIPPED | OUTPATIENT
Start: 2019-01-10 | End: 2019-08-20 | Stop reason: ALTCHOICE

## 2019-01-19 NOTE — PROGRESS NOTES
Gabby Tolentino is a 79year old female. HPI:   Patient is here for follow-up after her recent short hospitalization for vertigo. She states that she is feeling well. She still uses the meclizine intermittently. She has not resumed driving yet.   Sh Supine AHI 15 non-supine AHI 2 Sao2 Taj 73%   • Osteoarthritis     knees   • Renal disorder     baloon dilation right renal artery      Social History:  Social History    Tobacco Use      Smoking status: Never Smoker      Smokeless tobacco: Never Used

## 2019-01-20 DIAGNOSIS — K22.70 BARRETT'S ESOPHAGUS WITHOUT DYSPLASIA: ICD-10-CM

## 2019-01-21 DIAGNOSIS — K22.70 BARRETT'S ESOPHAGUS WITHOUT DYSPLASIA: ICD-10-CM

## 2019-01-21 RX ORDER — RANITIDINE 150 MG/1
CAPSULE ORAL
Qty: 180 CAPSULE | Refills: 0 | Status: SHIPPED | OUTPATIENT
Start: 2019-01-21 | End: 2019-04-22

## 2019-01-21 RX ORDER — RANITIDINE 150 MG/1
CAPSULE ORAL
Qty: 180 CAPSULE | Refills: 0 | OUTPATIENT
Start: 2019-01-21

## 2019-01-30 DIAGNOSIS — E78.00 HYPERCHOLESTEREMIA: ICD-10-CM

## 2019-01-31 RX ORDER — ROSUVASTATIN CALCIUM 40 MG/1
TABLET, COATED ORAL
Qty: 90 TABLET | Refills: 0 | Status: SHIPPED | OUTPATIENT
Start: 2019-01-31 | End: 2019-02-08

## 2019-02-08 ENCOUNTER — OFFICE VISIT (OUTPATIENT)
Dept: FAMILY MEDICINE CLINIC | Facility: CLINIC | Age: 69
End: 2019-02-08
Payer: MEDICARE

## 2019-02-08 VITALS
SYSTOLIC BLOOD PRESSURE: 134 MMHG | WEIGHT: 212 LBS | HEART RATE: 82 BPM | TEMPERATURE: 98 F | RESPIRATION RATE: 16 BRPM | DIASTOLIC BLOOD PRESSURE: 86 MMHG | BODY MASS INDEX: 36 KG/M2

## 2019-02-08 DIAGNOSIS — B34.9 VIRAL SYNDROME: ICD-10-CM

## 2019-02-08 DIAGNOSIS — R42 VERTIGO: Primary | ICD-10-CM

## 2019-02-08 PROCEDURE — 99213 OFFICE O/P EST LOW 20 MIN: CPT | Performed by: FAMILY MEDICINE

## 2019-02-08 NOTE — PROGRESS NOTES
Whit Harrison is a 81year old female. HPI:   Patient is here for follow-up of her fatigue and recurrent disequilibrium. She states her fatigue is slowly improving. She is somewhat frustrated by the slow rate of improvement.   She still has episode • High blood pressure    • High cholesterol    • History of stomach ulcers    • ISIS (obstructive sleep apnea) 04/11/18 PSG    AHI 6 Supine AHI 15 non-supine AHI 2 Sao2 Taj 73%   • Osteoarthritis     knees   • Renal disorder     baloon dilation right re

## 2019-02-10 ENCOUNTER — TELEPHONE (OUTPATIENT)
Dept: FAMILY MEDICINE CLINIC | Facility: CLINIC | Age: 69
End: 2019-02-10

## 2019-02-10 ENCOUNTER — PATIENT MESSAGE (OUTPATIENT)
Dept: FAMILY MEDICINE CLINIC | Facility: CLINIC | Age: 69
End: 2019-02-10

## 2019-02-10 DIAGNOSIS — R42 DIZZINESS: ICD-10-CM

## 2019-02-10 DIAGNOSIS — I65.01 STENOSIS OF RIGHT VERTEBRAL ARTERY: Primary | ICD-10-CM

## 2019-02-11 RX ORDER — PREDNISONE 50 MG/1
TABLET ORAL
Qty: 3 TABLET | Refills: 0 | Status: SHIPPED | OUTPATIENT
Start: 2019-02-11 | End: 2019-06-04 | Stop reason: ALTCHOICE

## 2019-02-11 RX ORDER — DIPHENHYDRAMINE HCL 50 MG
CAPSULE ORAL
Refills: 0 | COMMUNITY
Start: 2019-02-11 | End: 2019-08-20 | Stop reason: ALTCHOICE

## 2019-02-11 NOTE — TELEPHONE ENCOUNTER
From: Renay Valerio  To: Josue Mccoy MD  Sent: 2/10/2019 3:31 PM CST  Subject: Non-Urgent Medical Question    Hi I called on Saturday to schedule my CTA.  They told me they didn't have a fax order or a order in the computer for my test. Please

## 2019-02-11 NOTE — TELEPHONE ENCOUNTER
Please call the patient and verify what her reaction to IV contrast dye is.   Has she had IV contrast in the past?

## 2019-02-11 NOTE — TELEPHONE ENCOUNTER
Review of record shows 2/8/19 documentation of hives as reax to iodinated radiology contrast.  Record also shows pt had MRI w/without contrast 1/5/19. Call to pt-confirms reaction to radiology contrast dye was hives, itching and nausea.  Denies any dyspnea

## 2019-02-11 NOTE — PROGRESS NOTES
Was not ordered because I wanted to check with her reaction to IV contrast.  Okay to order a CTA of the head and neck with attention to the vertebral arteries. The patient will need to use the contrast allergy protocol.

## 2019-02-12 NOTE — TELEPHONE ENCOUNTER
Dr Yasmin Ann clarifies CTA brain and CTA carotids-attention: right proximal vertebral artery with dx: right vertebral artery stenosis and dizziness. CTA and contrast premedication orders placed.    Call to pt-advised CTA orders placed, discussed emg centr

## 2019-02-15 ENCOUNTER — HOSPITAL ENCOUNTER (OUTPATIENT)
Dept: CT IMAGING | Facility: HOSPITAL | Age: 69
Discharge: HOME | End: 2019-02-15
Attending: FAMILY MEDICINE
Payer: MEDICARE

## 2019-02-15 DIAGNOSIS — I65.01 STENOSIS OF RIGHT VERTEBRAL ARTERY: ICD-10-CM

## 2019-02-15 DIAGNOSIS — R42 DIZZINESS: ICD-10-CM

## 2019-02-15 LAB — CREAT SERPL-MCNC: 0.9 MG/DL (ref 0.55–1.02)

## 2019-02-15 PROCEDURE — 70496 CT ANGIOGRAPHY HEAD: CPT | Performed by: FAMILY MEDICINE

## 2019-02-15 PROCEDURE — 70498 CT ANGIOGRAPHY NECK: CPT | Performed by: FAMILY MEDICINE

## 2019-02-15 PROCEDURE — 82565 ASSAY OF CREATININE: CPT

## 2019-02-27 ENCOUNTER — APPOINTMENT (OUTPATIENT)
Dept: PHYSICAL THERAPY | Facility: HOSPITAL | Age: 69
End: 2019-02-27
Attending: PHYSICIAN ASSISTANT
Payer: MEDICARE

## 2019-02-28 VITALS
SYSTOLIC BLOOD PRESSURE: 172 MMHG | WEIGHT: 215 LBS | HEART RATE: 68 BPM | HEIGHT: 64 IN | DIASTOLIC BLOOD PRESSURE: 82 MMHG | BODY MASS INDEX: 36.7 KG/M2

## 2019-03-01 ENCOUNTER — HOSPITAL ENCOUNTER (OUTPATIENT)
Dept: PHYSICAL THERAPY | Facility: HOSPITAL | Age: 69
Setting detail: THERAPIES SERIES
Discharge: HOME | End: 2019-03-01
Attending: PHYSICIAN ASSISTANT
Payer: MEDICARE

## 2019-03-01 PROCEDURE — 97112 NEUROMUSCULAR REEDUCATION: CPT

## 2019-03-01 PROCEDURE — 95992 CANALITH REPOSITIONING PROC: CPT

## 2019-03-01 PROCEDURE — 97162 PT EVAL MOD COMPLEX 30 MIN: CPT

## 2019-03-01 NOTE — PROGRESS NOTES
VESTIBULAR EVALUATION:   Referring Physician: Dr. Uriah Chirinos  Date of Onset: 1/3/19 Date of Service: 3/1/2019   Diagnosis: Sensorineural hearing loss (SNHL) of left ear, unspecified hearing status on contralateral side, Dysfunction of both eustachian tubes Symptom Description: not really room spinning anymore, mainly feels off-balance and does not seem to be related to surface type; often looking ahead causes her vision to become very blurry and out of focus  Hearing: denies changes  Vision: distance i include to get rid of dizziness and return to PLOF. Past medical history was reviewed with Micaela Bolivar.  Significant findings include:   Past Medical History:   Diagnosis Date   • Pepper's esophagus    • Diverticulosis of large intestine    • Esophageal refl is cautious with head turns to R and L, performs slowly if has to or compensates with truncal rotation.    Neuro Screen:    Sensation: light touch intact    Coordination Testing:     Finger to Nose: WNL     Heel to Shin: WNL     Pronation/supination: WNL sessions      Today's treatment: Patient treated with CRT Epley L x 2 in response to positive [place]wood-Hallpike. First Epley resulted in minor recurrent of dizziness upon sitting with minor and short duration rebound nystagmus.  2nd Epley performed with symptoms advised of these findings, precautions, and treatment options and has agreed to actively participate in planning and for this course of care.      Thank you for your referral. If you have any questions, please contact me at Dept: [phone]     Sincerely,

## 2019-03-04 ENCOUNTER — HOSPITAL ENCOUNTER (OUTPATIENT)
Dept: PHYSICAL THERAPY | Facility: HOSPITAL | Age: 69
Setting detail: THERAPIES SERIES
Discharge: HOME | End: 2019-03-04
Attending: PHYSICIAN ASSISTANT
Payer: MEDICARE

## 2019-03-04 PROCEDURE — 97112 NEUROMUSCULAR REEDUCATION: CPT

## 2019-03-04 NOTE — PROGRESS NOTES
Dx: Sensorineural hearing loss (SNHL) of left ear, unspecified hearing status on contralateral side, Dysfunction of both eustachian tubes            Authorized # of Visits:  10 requested         Next MD visit: none scheduled  Fall Risk: standard         Pr FGA to be considered at dec risk for falls at home and in the community. 3. Patient will perform TUG in 8 sec or less to return to normal walking speed without dizziness and be able to safely cross the street.    4. Patient will report ability to return t

## 2019-03-08 ENCOUNTER — HOSPITAL ENCOUNTER (OUTPATIENT)
Dept: PHYSICAL THERAPY | Facility: HOSPITAL | Age: 69
Setting detail: THERAPIES SERIES
Discharge: HOME | End: 2019-03-08
Attending: PHYSICIAN ASSISTANT
Payer: MEDICARE

## 2019-03-08 PROCEDURE — 97112 NEUROMUSCULAR REEDUCATION: CPT

## 2019-03-08 NOTE — PROGRESS NOTES
Dx: Sensorineural hearing loss (SNHL) of left ear, unspecified hearing status on contralateral side, Dysfunction of both eustachian tubes            Authorized # of Visits:  10 requested         Next MD visit: none scheduled  Fall Risk: standard         Pr perform TUG in 8 sec or less to return to normal walking speed without dizziness and be able to safely cross the street. 4. Patient will report ability to return to water aerobics and working out with her  at the gym.    5. Patient will be NR x 3       Total Timed Treatment: 45 min  Total Treatment Time: 45 min

## 2019-03-11 ENCOUNTER — HOSPITAL ENCOUNTER (OUTPATIENT)
Dept: PHYSICAL THERAPY | Facility: HOSPITAL | Age: 69
Setting detail: THERAPIES SERIES
Discharge: HOME | End: 2019-03-11
Attending: PHYSICIAN ASSISTANT
Payer: MEDICARE

## 2019-03-11 PROCEDURE — 97112 NEUROMUSCULAR REEDUCATION: CPT

## 2019-03-11 NOTE — PROGRESS NOTES
Dx: Sensorineural hearing loss (SNHL) of left ear, unspecified hearing status on contralateral side, Dysfunction of both eustachian tubes            Authorized # of Visits:  10 requested         Next MD visit: none scheduled  Fall Risk: standard         Pr maintain progress made in PT. - issued and reviewed    Plan: Continue PT with progression as indicated. Progression dynamic gait; catch in gait.    Date: 3/4/2019 [place]#: 2/10 Date: 3/8/19      TX#: 3/10   Date: 3/11/19   TX#: 4/10 Date:               [place]#: 5/ reassessment, no CRT needed. NR gaze stabilization training with dynamic gait progression as listed, progression difficulty of compliant surface tasks within patient tolerance.     HEP: cervical rotation on airex with targets, cervical flexion/extension wit

## 2019-03-13 ENCOUNTER — HOSPITAL ENCOUNTER (OUTPATIENT)
Dept: PHYSICAL THERAPY | Facility: HOSPITAL | Age: 69
Setting detail: THERAPIES SERIES
Discharge: HOME | End: 2019-03-13
Attending: PHYSICIAN ASSISTANT
Payer: MEDICARE

## 2019-03-13 PROCEDURE — 97112 NEUROMUSCULAR REEDUCATION: CPT

## 2019-03-13 NOTE — PROGRESS NOTES
Dx: Sensorineural hearing loss (SNHL) of left ear, unspecified hearing status on contralateral side, Dysfunction of both eustachian tubes            Authorized # of Visits:  10 requested         Next MD visit: none scheduled  Fall Risk: standard         Pr community. - progress  3. Patient will perform TUG in 8 sec or less to return to normal walking speed without dizziness and be able to safely cross the street.    4. Patient will report ability to return to water aerobics and working out with her personal t taps knees x 10  - R/L twists x 10 DLS airex, EC  - alt OH, bend to taps knees x 10  - R/L twists x 10 DLS airex, EC  - alt OH, bend to taps knees x 10  - R/L twists x 10  - mod tandem 2 x 30s R/L      Gaze stabilization with ball  - R/L x 10 (hard)  - up/

## 2019-03-18 ENCOUNTER — HOSPITAL ENCOUNTER (OUTPATIENT)
Dept: PHYSICAL THERAPY | Facility: HOSPITAL | Age: 69
Setting detail: THERAPIES SERIES
Discharge: HOME | End: 2019-03-18
Attending: PHYSICIAN ASSISTANT
Payer: MEDICARE

## 2019-03-18 PROCEDURE — 97112 NEUROMUSCULAR REEDUCATION: CPT

## 2019-03-18 NOTE — PROGRESS NOTES
Progress Summary  Dx:  Sensorineural hearing loss (SNHL) of left ear, unspecified hearing status on contralateral side, Dysfunction of both eustachian tubes            Authorized # of Visits:  10 requested         Next MD visit: none scheduled  Fall Risk: negative; L negative for nystagmus (positive for subjective dizziness report)     Postural Control:  4-Stage Balance Test:                - Feet together: >30 sec                - Modified Tandem:  >30 sec SURAJ with mod sway                - Tandem Stance: therapist: Jace Madden, PT, DPT  [de-identified] certification required: Yes  I certify the need for these services furnished under this plan of treatment and while under my care.     X___________________________________________________ Date___________________ knees x 10  - R/L twists x 10  - mod tandem 2 x 30s R/L -     Gaze stabilization with ball  - R/L x 10 (hard)  - up/down x 10 (easier) Gaze stabilization with ball, sets throughout  - R/L 3 x 10  - up/down 3 x 10 Gaze stabilization with ball, sets througho

## 2019-03-20 ENCOUNTER — HOSPITAL ENCOUNTER (OUTPATIENT)
Dept: PHYSICAL THERAPY | Facility: HOSPITAL | Age: 69
Setting detail: THERAPIES SERIES
Discharge: HOME | End: 2019-03-20
Attending: PHYSICIAN ASSISTANT
Payer: MEDICARE

## 2019-03-20 PROCEDURE — 97112 NEUROMUSCULAR REEDUCATION: CPT

## 2019-03-20 NOTE — PROGRESS NOTES
Dx: Sensorineural hearing loss (SNHL) of left ear, unspecified hearing status on contralateral side, Dysfunction of both eustachian tubes            Authorized # of Visits:  12 requested         Next MD visit: none scheduled  Fall Risk: standard         Pr for falls at home and in the community. - progress, 23/30  3. Patient will perform TUG in 8 sec or less to return to normal walking speed without dizziness and be able to safely cross the street. - progress, 10 sec  4.  Patient will report ability to return (hard)    Airex   - horizontal saccades 3 x 20  - multi-direct catch x 25 Airex  - mod tandem catch x 15 R/L  - HT R/L to targets 2 x 10 throughout Airex beam   - feet apart catch x 15  - feet together catch x 15 Airex beam   - feet together catch x 15  - R/L    Skilled Services: NR balance and compliant surface training with gaze stabilization as needed to decrease feeling of imbalance and improve functional gait, such as ability to perform HT R/L in gait.    HEP: cervical rotation on airex with targets, ce

## 2019-03-25 ENCOUNTER — HOSPITAL ENCOUNTER (OUTPATIENT)
Dept: PHYSICAL THERAPY | Facility: HOSPITAL | Age: 69
Setting detail: THERAPIES SERIES
Discharge: HOME | End: 2019-03-25
Attending: PHYSICIAN ASSISTANT
Payer: MEDICARE

## 2019-03-25 PROCEDURE — 97112 NEUROMUSCULAR REEDUCATION: CPT

## 2019-03-25 NOTE — PROGRESS NOTES
Dx: Sensorineural hearing loss (SNHL) of left ear, unspecified hearing status on contralateral side, Dysfunction of both eustachian tubes            Authorized # of Visits:  12 requested         Next MD visit: none scheduled  Fall Risk: standard         Pr short distance fwd gait, however, in open environment, noted increased imbalance and diminished ability to appropriately perform. Continues to require at least intermittent one hand assist with compliant surface bosu.      Goals: (To be met in 12 visits)  1 x 5 Forward/retro walking with gaze stabilization, 50ft x 6 - p/u cone (5) in gait x 1 (no problem)  - behind back cone taps with gaze stab x 15 R/L - sit<>stand on airex with gaze stab on ball x 5 (knees bothersome) Goals reassessment throughout as needed x 1 moderate, increased distance 3 x 20s  - attempted with walking (poor) VOR x 1 with 15 ft fwd walk x 10 total   Walking with HT, 50ft  - R/L x 5  - up/down x 4 Walking with HT, 50ft  - R/L x 5  - up/down x 4 Walking addressing R/L HT in gait; use of con

## 2019-03-26 ENCOUNTER — HOSPITAL ENCOUNTER (OUTPATIENT)
Dept: CT IMAGING | Facility: HOSPITAL | Age: 69
Discharge: HOME | End: 2019-03-26
Attending: OTOLARYNGOLOGY
Payer: MEDICARE

## 2019-03-26 DIAGNOSIS — R42 DIZZINESS: ICD-10-CM

## 2019-03-26 PROCEDURE — 70480 CT ORBIT/EAR/FOSSA W/O DYE: CPT | Performed by: OTOLARYNGOLOGY

## 2019-03-28 NOTE — PROGRESS NOTES
Per Veronika Raymundo PA-C, called and spoke to pt to inform of test results and recommendations. Pt verbalized understanding.

## 2019-03-28 NOTE — PROGRESS NOTES
Ct temporal bone showing mild left mastoid inflammation present otherwise negative for any significant abnormalities seen recommend keep follow up

## 2019-04-01 ENCOUNTER — PATIENT MESSAGE (OUTPATIENT)
Dept: FAMILY MEDICINE CLINIC | Facility: CLINIC | Age: 69
End: 2019-04-01

## 2019-04-01 ENCOUNTER — HOSPITAL ENCOUNTER (OUTPATIENT)
Dept: PHYSICAL THERAPY | Facility: HOSPITAL | Age: 69
Setting detail: THERAPIES SERIES
Discharge: HOME | End: 2019-04-01
Attending: PHYSICIAN ASSISTANT
Payer: MEDICARE

## 2019-04-01 DIAGNOSIS — I10 BENIGN ESSENTIAL HYPERTENSION: ICD-10-CM

## 2019-04-01 PROCEDURE — 97140 MANUAL THERAPY 1/> REGIONS: CPT

## 2019-04-01 PROCEDURE — 97112 NEUROMUSCULAR REEDUCATION: CPT

## 2019-04-01 RX ORDER — LOSARTAN POTASSIUM AND HYDROCHLOROTHIAZIDE 25; 100 MG/1; MG/1
1 TABLET ORAL DAILY
Qty: 90 TABLET | Refills: 0 | Status: SHIPPED | OUTPATIENT
Start: 2019-04-01 | End: 2019-06-29

## 2019-04-01 NOTE — PROGRESS NOTES
Dx: Sensorineural hearing loss (SNHL) of left ear, unspecified hearing status on contralateral side, Dysfunction of both eustachian tubes            Authorized # of Visits:  12 requested         Next MD visit: none scheduled  Fall Risk: standard         Pr community. - progress, 23/30  3. Patient will perform TUG in 8 sec or less to return to normal walking speed without dizziness and be able to safely cross the street. - progress, 10 sec  4.  Patient will report ability to return to water aerobics and workin walking/retro walking with gaze stabilization, 15ft x 5 - Standing alt tap to cones behind R/L   - level x 10  - airex x 10 SLS hip hinge tap to cone with intermittent 1UE support x 10 R/L (hard) Lunge to airex with rebounder catch x 10 R/L 75ft distance s - HT to targets R/L DLS on airex x 15 R/L HT to targets R/L DLS on airex beam x 15 R/L HT to targets R/L DLS on airex beam x 15 R/L HT to targets with mod tandem stance x 10 R/L (much easier R back), intermittent taps  DL Shuttle hops.  25# 3 x 10  Bosu s

## 2019-04-08 ENCOUNTER — HOSPITAL ENCOUNTER (OUTPATIENT)
Dept: PHYSICAL THERAPY | Facility: HOSPITAL | Age: 69
Setting detail: THERAPIES SERIES
Discharge: HOME | End: 2019-04-08
Attending: PHYSICIAN ASSISTANT
Payer: MEDICARE

## 2019-04-08 PROCEDURE — 97112 NEUROMUSCULAR REEDUCATION: CPT

## 2019-04-08 PROCEDURE — 97140 MANUAL THERAPY 1/> REGIONS: CPT

## 2019-04-08 NOTE — PROGRESS NOTES
Dx: Sensorineural hearing loss (SNHL) of left ear, unspecified hearing status on contralateral side, Dysfunction of both eustachian tubes            Authorized # of Visits:  12 requested         Next MD visit: none scheduled  Fall Risk: standard         Pr speed without dizziness and be able to safely cross the street. - progress, 10 sec  4. Patient will report ability to return to water aerobics and working out with her  at the gym. - progress  5.  Patient will be independent and compliant in intermittent 1UE support x 10 R/L (hard) Lunge to airex with rebounder catch x 10 R/L 75ft distance shifting gaze from close to far target, gradually taking target away down to 1 at end x 5 min 75ft distance shifting gaze from close to far target, graduall DLS on airex beam x 15 R/L HT to targets R/L DLS on airex beam x 15 R/L HT to targets with mod tandem stance x 10 R/L (much easier R back), intermittent taps  DL Shuttle hops.  25# 3 x 10  Bosu step ups with watching traffic x 10 R/L, intermittent UE assist

## 2019-04-12 DIAGNOSIS — R42 VERTIGO: Primary | ICD-10-CM

## 2019-04-12 DIAGNOSIS — R53.83 FATIGUE, UNSPECIFIED TYPE: ICD-10-CM

## 2019-04-16 ENCOUNTER — LAB ENCOUNTER (OUTPATIENT)
Dept: LAB | Age: 69
End: 2019-04-16
Attending: FAMILY MEDICINE
Payer: MEDICARE

## 2019-04-16 DIAGNOSIS — Z11.59 NEED FOR HEPATITIS C SCREENING TEST: ICD-10-CM

## 2019-04-16 DIAGNOSIS — I10 BENIGN ESSENTIAL HYPERTENSION: ICD-10-CM

## 2019-04-16 DIAGNOSIS — R53.83 FATIGUE, UNSPECIFIED TYPE: ICD-10-CM

## 2019-04-16 DIAGNOSIS — R42 VERTIGO: ICD-10-CM

## 2019-04-16 PROCEDURE — 84443 ASSAY THYROID STIM HORMONE: CPT

## 2019-04-16 PROCEDURE — 84439 ASSAY OF FREE THYROXINE: CPT

## 2019-04-16 PROCEDURE — 85025 COMPLETE CBC W/AUTO DIFF WBC: CPT

## 2019-04-16 PROCEDURE — 36415 COLL VENOUS BLD VENIPUNCTURE: CPT

## 2019-04-16 PROCEDURE — 86803 HEPATITIS C AB TEST: CPT

## 2019-04-17 ENCOUNTER — HOSPITAL ENCOUNTER (OUTPATIENT)
Dept: PHYSICAL THERAPY | Facility: HOSPITAL | Age: 69
Setting detail: THERAPIES SERIES
Discharge: HOME | End: 2019-04-17
Attending: PHYSICIAN ASSISTANT
Payer: MEDICARE

## 2019-04-17 PROCEDURE — 97110 THERAPEUTIC EXERCISES: CPT

## 2019-04-17 PROCEDURE — 97112 NEUROMUSCULAR REEDUCATION: CPT

## 2019-04-17 NOTE — PROGRESS NOTES
Dx: Sensorineural hearing loss (SNHL) of left ear, unspecified hearing status on contralateral side, Dysfunction of both eustachian tubes            Authorized # of Visits:  12 requested         Next MD visit: 4/30/19  Fall Risk: standard         Precautio listed below. Goals: (To be met in 12 visits)  1. Patient will demonstrate ability to perform bed mobility, rolling, supine<>sit, and sit<>stand without symptoms or dizziness. - progress  2.  Patient will score at least 26/30 on the FGA to be consider side step up/over and over/back with gaze stab, fading UEs x 10 R/L Resisted cable walking, 25# watching traffic x 12 Resisted cable walking, 25# watching traffic x 12 Habituation  - seated up/down to elbow with gaze stab x 10 R/L  - sit<>SL with gaze stab business of background x 5 min   Dynamic Gait, 50ft laps  - catch x 4  - call out random directions/changes in gait x 2 min  - HT R/L, mod speed x 4 Dynamic gait, progressing those issues of the FGA still having issue with x 10 min Fwd/retro tandem walking

## 2019-04-22 ENCOUNTER — HOSPITAL ENCOUNTER (OUTPATIENT)
Dept: PHYSICAL THERAPY | Facility: HOSPITAL | Age: 69
Setting detail: THERAPIES SERIES
Discharge: HOME | End: 2019-04-22
Attending: PHYSICIAN ASSISTANT
Payer: MEDICARE

## 2019-04-22 DIAGNOSIS — K22.70 BARRETT'S ESOPHAGUS WITHOUT DYSPLASIA: ICD-10-CM

## 2019-04-22 PROCEDURE — 97112 NEUROMUSCULAR REEDUCATION: CPT

## 2019-04-22 PROCEDURE — 97110 THERAPEUTIC EXERCISES: CPT

## 2019-04-22 RX ORDER — RANITIDINE 150 MG/1
CAPSULE ORAL
Qty: 180 CAPSULE | Refills: 0 | Status: SHIPPED | OUTPATIENT
Start: 2019-04-22 | End: 2019-07-20

## 2019-04-22 NOTE — PROGRESS NOTES
Progress/Discharge Summary  Dx:  Sensorineural hearing loss (SNHL) of left ear, unspecified hearing status on contralateral side, Dysfunction of both eustachian tubes            Authorized # of Visits:  12 requested         Next MD visit: 4/30/19  [facility] program at this time, but may return as needed per plan as listed below.      Objective:   Positional Testing:  Maria [place]en-Hallpike: R negative; L negative for nystagmus, negative for symptom report  Transfers: includes rolling, supine<>sit, sit<>stand: WNL, no di well and will consider this date formal D/C from PT. Patient/Family/Caregiver was advised of these findings, precautions, and treatment options and has agreed to actively participate in planning and for this course of care.     Thank you for your referra cable walking, 25# watching traffic x 12 Resisted cable walking, 25# watching traffic x 12 Habituation  - seated up/down to elbow with gaze stab x 10 R/L  - sit<>SL with gaze stab x 10 R/L Education HEP progression and POC throughout as needed   Standing a increasing business of background x 5 min VOR x 1 in gait, gradually increasing business of background x 3 min   Dynamic gait, progressing those issues of the FGA still having issue with x 10 min Fwd/retro tandem walking with mirror feedback x 3 laps anahi

## 2019-04-28 DIAGNOSIS — E78.00 HYPERCHOLESTEREMIA: ICD-10-CM

## 2019-04-29 RX ORDER — ROSUVASTATIN CALCIUM 40 MG/1
TABLET, COATED ORAL
Qty: 90 TABLET | Refills: 0 | Status: SHIPPED | OUTPATIENT
Start: 2019-04-29 | End: 2019-10-10

## 2019-04-30 ENCOUNTER — APPOINTMENT (OUTPATIENT)
Dept: LAB | Age: 69
End: 2019-04-30
Attending: FAMILY MEDICINE
Payer: MEDICARE

## 2019-04-30 DIAGNOSIS — R79.9 ABNORMAL BLOOD CHEMISTRY: ICD-10-CM

## 2019-04-30 DIAGNOSIS — E78.00 HYPERCHOLESTEREMIA: ICD-10-CM

## 2019-04-30 DIAGNOSIS — N28.9 RENAL INSUFFICIENCY: ICD-10-CM

## 2019-04-30 DIAGNOSIS — I10 BENIGN ESSENTIAL HYPERTENSION: ICD-10-CM

## 2019-04-30 PROCEDURE — 80053 COMPREHEN METABOLIC PANEL: CPT

## 2019-04-30 PROCEDURE — 36415 COLL VENOUS BLD VENIPUNCTURE: CPT

## 2019-04-30 PROCEDURE — 80061 LIPID PANEL: CPT

## 2019-05-01 DIAGNOSIS — R79.89 ELEVATED SERUM CREATININE: ICD-10-CM

## 2019-05-01 DIAGNOSIS — E78.00 HYPERCHOLESTEREMIA: Primary | ICD-10-CM

## 2019-06-01 DIAGNOSIS — N95.1 FLUSHING, MENOPAUSAL: ICD-10-CM

## 2019-06-01 DIAGNOSIS — Z78.0 POSTMENOPAUSAL: ICD-10-CM

## 2019-06-03 RX ORDER — ESTRADIOL 1 MG/1
TABLET ORAL
Qty: 90 TABLET | Refills: 0 | Status: SHIPPED | OUTPATIENT
Start: 2019-06-03 | End: 2019-08-31

## 2019-06-04 ENCOUNTER — OFFICE VISIT (OUTPATIENT)
Dept: FAMILY MEDICINE CLINIC | Facility: CLINIC | Age: 69
End: 2019-06-04
Payer: MEDICARE

## 2019-06-04 VITALS
WEIGHT: 213 LBS | HEIGHT: 64.5 IN | DIASTOLIC BLOOD PRESSURE: 100 MMHG | HEART RATE: 70 BPM | BODY MASS INDEX: 35.92 KG/M2 | SYSTOLIC BLOOD PRESSURE: 180 MMHG | RESPIRATION RATE: 14 BRPM

## 2019-06-04 DIAGNOSIS — E78.00 HYPERCHOLESTEREMIA: ICD-10-CM

## 2019-06-04 DIAGNOSIS — K22.70 BARRETT'S ESOPHAGUS WITHOUT DYSPLASIA: ICD-10-CM

## 2019-06-04 DIAGNOSIS — R42 VERTIGO: ICD-10-CM

## 2019-06-04 DIAGNOSIS — I65.01 STENOSIS OF RIGHT VERTEBRAL ARTERY: ICD-10-CM

## 2019-06-04 DIAGNOSIS — I10 BENIGN ESSENTIAL HYPERTENSION: Primary | ICD-10-CM

## 2019-06-04 DIAGNOSIS — M67.40 GANGLION CYST: ICD-10-CM

## 2019-06-04 PROCEDURE — 99214 OFFICE O/P EST MOD 30 MIN: CPT | Performed by: FAMILY MEDICINE

## 2019-06-04 RX ORDER — CARVEDILOL 6.25 MG/1
6.25 TABLET ORAL 2 TIMES DAILY
Qty: 180 TABLET | Refills: 1 | Status: SHIPPED | OUTPATIENT
Start: 2019-06-04 | End: 2019-10-10

## 2019-06-04 RX ORDER — CARVEDILOL 3.12 MG/1
3.12 TABLET ORAL 2 TIMES DAILY
COMMUNITY
Start: 2019-02-08 | End: 2019-06-04

## 2019-06-07 ENCOUNTER — TELEPHONE (OUTPATIENT)
Dept: FAMILY MEDICINE CLINIC | Facility: CLINIC | Age: 69
End: 2019-06-07

## 2019-06-07 NOTE — TELEPHONE ENCOUNTER
Spoke with pt regarding Carvedilol 6.25 bid. She stated she will call back in one week with updated BP/pulse readings.

## 2019-06-11 ENCOUNTER — MED REC SCAN ONLY (OUTPATIENT)
Dept: FAMILY MEDICINE CLINIC | Facility: CLINIC | Age: 69
End: 2019-06-11

## 2019-06-13 NOTE — PROGRESS NOTES
Kizzy Wong is a 81year old female. HPI:   Patient presents for recheck of her hypertension. Pt has been taking medications as instructed, no medication side effects, home BP monitoring in the range of 164'F systolic and 45'K diastolic.  The pa Take 2,000 mcg by mouth daily. Disp:  Rfl:    DiphenhydrAMINE HCl 50 MG Oral Cap Take 50 mg by mouth one hour prior to contrast administration.  Disp:  Rfl: 0   Meclizine HCl 12.5 MG Oral Tab Take 1 tablet (12.5 mg total) by mouth 3 (three) times daily as developed, well nourished,in no apparent distress  SKIN: no rashes,no suspicious lesions  HEENT: atraumatic, normocephalic,ears and throat are clear  NECK: supple,no adenopathy,no bruits  LUNGS: clear to auscultation  CARDIO: RRR without murmur    PULSE EX

## 2019-06-29 DIAGNOSIS — I10 BENIGN ESSENTIAL HYPERTENSION: ICD-10-CM

## 2019-07-01 RX ORDER — LOSARTAN POTASSIUM AND HYDROCHLOROTHIAZIDE 25; 100 MG/1; MG/1
1 TABLET ORAL DAILY
Qty: 90 TABLET | Refills: 0 | Status: SHIPPED | OUTPATIENT
Start: 2019-07-01 | End: 2019-09-17

## 2019-07-20 DIAGNOSIS — K22.70 BARRETT'S ESOPHAGUS WITHOUT DYSPLASIA: ICD-10-CM

## 2019-07-20 RX ORDER — RANITIDINE 150 MG/1
CAPSULE ORAL
Qty: 180 CAPSULE | Refills: 1 | Status: SHIPPED | OUTPATIENT
Start: 2019-07-20 | End: 2019-10-10

## 2019-08-31 DIAGNOSIS — Z78.0 POSTMENOPAUSAL: ICD-10-CM

## 2019-08-31 DIAGNOSIS — N95.1 FLUSHING, MENOPAUSAL: ICD-10-CM

## 2019-09-03 RX ORDER — ESTRADIOL 1 MG/1
TABLET ORAL
Qty: 90 TABLET | Refills: 0 | Status: SHIPPED | OUTPATIENT
Start: 2019-09-03 | End: 2019-10-10

## 2019-09-16 ENCOUNTER — TELEPHONE (OUTPATIENT)
Dept: FAMILY MEDICINE CLINIC | Facility: CLINIC | Age: 69
End: 2019-09-16

## 2019-09-16 ENCOUNTER — PATIENT MESSAGE (OUTPATIENT)
Dept: FAMILY MEDICINE CLINIC | Facility: CLINIC | Age: 69
End: 2019-09-16

## 2019-09-16 DIAGNOSIS — Z12.31 ENCOUNTER FOR SCREENING MAMMOGRAM FOR BREAST CANCER: Primary | ICD-10-CM

## 2019-09-16 NOTE — TELEPHONE ENCOUNTER
From: Mohan Vaelrio  To: Jaclyn Nunez MD  Sent: 9/16/2019 1:34 PM CDT  Subject: Other    I am trying to schedule a Mammogram which is due now.  Please send a order for this so I can schedule the exam. Thank you Enriqueta Turner

## 2019-09-17 ENCOUNTER — PATIENT MESSAGE (OUTPATIENT)
Dept: FAMILY MEDICINE CLINIC | Facility: CLINIC | Age: 69
End: 2019-09-17

## 2019-09-17 DIAGNOSIS — I10 BENIGN ESSENTIAL HYPERTENSION: ICD-10-CM

## 2019-09-17 RX ORDER — LOSARTAN POTASSIUM AND HYDROCHLOROTHIAZIDE 25; 100 MG/1; MG/1
1 TABLET ORAL DAILY
Qty: 90 TABLET | Refills: 0 | Status: SHIPPED | OUTPATIENT
Start: 2019-09-17 | End: 2019-10-02

## 2019-09-19 NOTE — PROGRESS NOTES
How Severe Is Your Eczema?: mild Pt scheduled for a flu shoto n 9/26/19. Is This A New Presentation, Or A Follow-Up?: Rash

## 2019-09-26 ENCOUNTER — HOSPITAL ENCOUNTER (OUTPATIENT)
Dept: MAMMOGRAPHY | Age: 69
Discharge: HOME | End: 2019-09-26
Attending: NURSE PRACTITIONER
Payer: MEDICARE

## 2019-09-26 ENCOUNTER — IMMUNIZATION (OUTPATIENT)
Dept: FAMILY MEDICINE CLINIC | Facility: CLINIC | Age: 69
End: 2019-09-26
Payer: MEDICARE

## 2019-09-26 DIAGNOSIS — Z23 NEED FOR VACCINATION: ICD-10-CM

## 2019-09-26 DIAGNOSIS — Z12.31 ENCOUNTER FOR SCREENING MAMMOGRAM FOR BREAST CANCER: ICD-10-CM

## 2019-09-26 PROCEDURE — 77063 BREAST TOMOSYNTHESIS BI: CPT | Performed by: NURSE PRACTITIONER

## 2019-09-26 PROCEDURE — 90662 IIV NO PRSV INCREASED AG IM: CPT | Performed by: FAMILY MEDICINE

## 2019-09-26 PROCEDURE — G0008 ADMIN INFLUENZA VIRUS VAC: HCPCS | Performed by: FAMILY MEDICINE

## 2019-09-26 PROCEDURE — 77067 SCR MAMMO BI INCL CAD: CPT | Performed by: NURSE PRACTITIONER

## 2019-09-30 ENCOUNTER — PATIENT MESSAGE (OUTPATIENT)
Dept: FAMILY MEDICINE CLINIC | Facility: CLINIC | Age: 69
End: 2019-09-30

## 2019-10-02 RX ORDER — LOSARTAN POTASSIUM 100 MG/1
100 TABLET ORAL DAILY
Qty: 90 TABLET | Refills: 0 | Status: SHIPPED | OUTPATIENT
Start: 2019-10-02 | End: 2020-01-14

## 2019-10-02 RX ORDER — HYDROCHLOROTHIAZIDE 25 MG/1
25 TABLET ORAL DAILY
Qty: 90 TABLET | Refills: 0 | Status: SHIPPED | OUTPATIENT
Start: 2019-10-02 | End: 2020-01-14

## 2019-10-02 NOTE — TELEPHONE ENCOUNTER
From: Gertrude Valerio  To: Zana Harvey MD  Sent: 9/30/2019 5:50 PM CDT  Subject: Prescription Question    [facility] have notify me that they have sent you a Email on changing of my medication.  Losartan/Hctz Tabs need to be change to 2 separate

## 2019-10-10 DIAGNOSIS — Z78.0 POSTMENOPAUSAL: ICD-10-CM

## 2019-10-10 DIAGNOSIS — I10 BENIGN ESSENTIAL HYPERTENSION: ICD-10-CM

## 2019-10-10 DIAGNOSIS — K22.70 BARRETT'S ESOPHAGUS WITHOUT DYSPLASIA: ICD-10-CM

## 2019-10-10 DIAGNOSIS — N95.1 FLUSHING, MENOPAUSAL: ICD-10-CM

## 2019-10-10 DIAGNOSIS — E78.00 HYPERCHOLESTEREMIA: ICD-10-CM

## 2019-10-10 RX ORDER — MELOXICAM 15 MG/1
15 TABLET ORAL
Refills: 2 | COMMUNITY
Start: 2019-08-28 | End: 2019-12-06 | Stop reason: ALTCHOICE

## 2019-10-10 RX ORDER — CARVEDILOL 6.25 MG/1
6.25 TABLET ORAL 2 TIMES DAILY
Qty: 180 TABLET | Refills: 1 | Status: SHIPPED | OUTPATIENT
Start: 2019-10-10 | End: 2020-07-06

## 2019-10-10 RX ORDER — RANITIDINE 150 MG/1
CAPSULE ORAL
Qty: 180 CAPSULE | Refills: 1 | Status: SHIPPED | OUTPATIENT
Start: 2019-10-10 | End: 2020-01-14

## 2019-10-10 RX ORDER — ROSUVASTATIN CALCIUM 40 MG/1
TABLET, COATED ORAL
Qty: 90 TABLET | Refills: 0 | Status: SHIPPED | OUTPATIENT
Start: 2019-10-10 | End: 2020-02-19

## 2019-10-10 RX ORDER — ESTRADIOL 1 MG/1
TABLET ORAL
Qty: 90 TABLET | Refills: 0 | Status: SHIPPED | OUTPATIENT
Start: 2019-10-10 | End: 2020-03-15

## 2019-10-10 RX ORDER — MELOXICAM 15 MG/1
15 TABLET ORAL DAILY
Qty: 30 TABLET | Refills: 0 | Status: CANCELLED | OUTPATIENT
Start: 2019-10-10

## 2019-11-12 ENCOUNTER — TELEPHONE (OUTPATIENT)
Dept: FAMILY MEDICINE CLINIC | Facility: CLINIC | Age: 69
End: 2019-11-12

## 2019-11-12 DIAGNOSIS — Z23 NEED FOR VACCINATION: Primary | ICD-10-CM

## 2019-11-13 NOTE — PLAN OF CARE
Problem: Impaired Functional Mobility  Goal: Achieve highest/safest level of mobility/gait  Interventions:  - Assess patient's functional ability and stability  - Promote increasing activity/tolerance for mobility and gait  - Educate and engage patient/fam Preventive measure

## 2019-11-15 ENCOUNTER — NURSE ONLY (OUTPATIENT)
Dept: FAMILY MEDICINE CLINIC | Facility: CLINIC | Age: 69
End: 2019-11-15
Payer: MEDICARE

## 2019-11-15 PROCEDURE — G0009 ADMIN PNEUMOCOCCAL VACCINE: HCPCS | Performed by: FAMILY MEDICINE

## 2019-11-15 PROCEDURE — 90670 PCV13 VACCINE IM: CPT | Performed by: FAMILY MEDICINE

## 2019-11-18 ENCOUNTER — OFFICE VISIT (OUTPATIENT)
Dept: FAMILY MEDICINE CLINIC | Facility: CLINIC | Age: 69
End: 2019-11-18
Payer: MEDICARE

## 2019-11-18 VITALS
HEIGHT: 64 IN | TEMPERATURE: 99 F | HEART RATE: 80 BPM | BODY MASS INDEX: 36.37 KG/M2 | RESPIRATION RATE: 16 BRPM | SYSTOLIC BLOOD PRESSURE: 132 MMHG | WEIGHT: 213 LBS | DIASTOLIC BLOOD PRESSURE: 88 MMHG

## 2019-11-18 DIAGNOSIS — H65.111 ACUTE ALLERGIC SEROUS OTITIS MEDIA OF RIGHT EAR: Primary | ICD-10-CM

## 2019-11-18 PROCEDURE — 99213 OFFICE O/P EST LOW 20 MIN: CPT | Performed by: FAMILY MEDICINE

## 2019-11-18 RX ORDER — METHYLPREDNISOLONE 4 MG/1
TABLET ORAL
Qty: 1 KIT | Refills: 0 | Status: SHIPPED | OUTPATIENT
Start: 2019-11-18 | End: 2019-12-06 | Stop reason: ALTCHOICE

## 2019-11-18 NOTE — PROGRESS NOTES
HPI:   Anna Gambino is a 81year old female who presents for upper respiratory symptoms for  3  days. Patient reports congestion, ear pain, sneezing.     Current Outpatient Medications   Medication Sig Dispense Refill   • Rosuvastatin Calcium 40 MG O lung cancer   • Heart Disorder Maternal Grandmother    • Heart Disorder Maternal Grandfather    • Heart Disorder Paternal Grandfather         MI   • Heart Disorder Brother    • Breast Cancer Maternal Aunt 76      Social History    Tobacco Use      Smoking

## 2019-12-06 ENCOUNTER — APPOINTMENT (OUTPATIENT)
Dept: LAB | Age: 69
End: 2019-12-06
Attending: FAMILY MEDICINE
Payer: MEDICARE

## 2019-12-06 ENCOUNTER — OFFICE VISIT (OUTPATIENT)
Dept: FAMILY MEDICINE CLINIC | Facility: CLINIC | Age: 69
End: 2019-12-06
Payer: MEDICARE

## 2019-12-06 VITALS
RESPIRATION RATE: 14 BRPM | HEART RATE: 70 BPM | SYSTOLIC BLOOD PRESSURE: 136 MMHG | WEIGHT: 209 LBS | DIASTOLIC BLOOD PRESSURE: 72 MMHG | HEIGHT: 64 IN | TEMPERATURE: 98 F | BODY MASS INDEX: 35.68 KG/M2

## 2019-12-06 DIAGNOSIS — E78.00 HYPERCHOLESTEREMIA: ICD-10-CM

## 2019-12-06 DIAGNOSIS — N28.9 RENAL INSUFFICIENCY: ICD-10-CM

## 2019-12-06 DIAGNOSIS — G47.33 OSA ON CPAP: ICD-10-CM

## 2019-12-06 DIAGNOSIS — N95.1 FLUSHING, MENOPAUSAL: ICD-10-CM

## 2019-12-06 DIAGNOSIS — K22.70 BARRETT'S ESOPHAGUS WITHOUT DYSPLASIA: ICD-10-CM

## 2019-12-06 DIAGNOSIS — Z99.89 OSA ON CPAP: ICD-10-CM

## 2019-12-06 DIAGNOSIS — I10 BENIGN ESSENTIAL HYPERTENSION: ICD-10-CM

## 2019-12-06 DIAGNOSIS — Z00.00 ENCOUNTER FOR ANNUAL HEALTH EXAMINATION: Primary | ICD-10-CM

## 2019-12-06 DIAGNOSIS — R79.89 ELEVATED SERUM CREATININE: ICD-10-CM

## 2019-12-06 PROCEDURE — G0439 PPPS, SUBSEQ VISIT: HCPCS | Performed by: FAMILY MEDICINE

## 2019-12-06 PROCEDURE — 80053 COMPREHEN METABOLIC PANEL: CPT

## 2019-12-06 PROCEDURE — 99213 OFFICE O/P EST LOW 20 MIN: CPT | Performed by: FAMILY MEDICINE

## 2019-12-06 PROCEDURE — 80061 LIPID PANEL: CPT

## 2019-12-06 PROCEDURE — 36415 COLL VENOUS BLD VENIPUNCTURE: CPT

## 2019-12-06 NOTE — PROGRESS NOTES
HPI:   Isidra Guzman is a 81year old female who presents for a Medicare Subsequent Annual Wellness visit (Pt already had Initial Annual Wellness). Patient states that overall she is feeling well.   She did see Dr. Kenia Edwards in regard to her right Rosuvastatin Calcium 40 MG Oral Tab, TAKE 1 TABLET(40 MG) BY MOUTH EVERY NIGHT  raNITIdine HCl 150 MG Oral Cap, TAKE 1 CAPSULE(150 MG) BY MOUTH TWICE DAILY  carvedilol 6.25 MG Oral Tab, Take 1 tablet (6.25 mg total) by mouth 2 (two) times daily.   estradi headaches  PSYCHE: denies depression or anxiety  HEMATOLOGIC: denies hx of anemia  ENDOCRINE: denies thyroid history  ALL/ASTHMA: denies hx of allergy or asthma    EXAM:   /72   Pulse 70   Temp 98.1 °F (36.7 °C) (Oral)   Resp 14   Ht 64\"   Wt 209 lb Hyaluronan Or Derivative, Monovisc 10/16/2017       ASSESSMENT AND OTHER RELEVANT CHRONIC CONDITIONS:   Whit Harrison is a 81year old female who presents for a Medicare Assessment. PLAN SUMMARY:   Diagnoses and all orders for this visit:    1.  E Appropriate Exercise    How would you describe your daily physical activity?: Moderate    How would you describe your current health state?: Good    How do you maintain positive mental well-being?: Social Interaction; Visiting Family; Visiting Friends (FSB)Annually Glucose (mg/dL)   Date Value   12/06/2019 98          Cardiovascular Disease Screening     LDL Annually LDL Cholesterol (mg/dL)   Date Value   12/06/2019 64        EKG - w/ Initial Preventative Physical Exam only, or if medically necessary [place] Persons who live in the same house as a HepB virus carrier   Homosexual men   Illicit injectable drug abusers     Tetanus Toxoid  Only covered with a cut with metal- TD and TDaP Not covered by Medicare Part B No vaccine history found This may be covered

## 2019-12-06 NOTE — PATIENT INSTRUCTIONS
Axel Valerio's SCREENING SCHEDULE   Tests on this list are recommended by your physician but may not be covered, or covered at this frequency, by your insurer. Please check with your insurance carrier before scheduling to verify coverage.    PREVEN 10 years- more often if abnormal Colonoscopy due on 12/11/2022 Update [facility] if applicable    Flex Sigmoidoscopy Screen  Covered every 5 years No results found for this or any previous visit. No flowsheet data found.      Fecal Occult Blood   Co Please get once after your 65th birthday    Pneumococcal 23 (Pneumovax)  Covered Once after 65 Orders placed or performed in visit on 10/04/18   • PNEUMOCOCCAL IMM (PNEUMOVAX)   Orders placed or performed in visit on 11/03/17   • PNEUMOCOCCAL IMM (Marcelo Brewster

## 2019-12-09 DIAGNOSIS — E78.00 HYPERCHOLESTEREMIA: ICD-10-CM

## 2019-12-09 DIAGNOSIS — I10 BENIGN ESSENTIAL HTN: Primary | ICD-10-CM

## 2019-12-15 ENCOUNTER — PATIENT MESSAGE (OUTPATIENT)
Dept: FAMILY MEDICINE CLINIC | Facility: CLINIC | Age: 69
End: 2019-12-15

## 2019-12-15 DIAGNOSIS — R41.3 SHORT-TERM MEMORY LOSS: Primary | ICD-10-CM

## 2019-12-17 NOTE — TELEPHONE ENCOUNTER
From: Anabelle Valerio  To: Renee Rojas MD  Sent: 12/15/2019 6:11 PM CST  Subject: Non-Urgent Medical Question    I Please ask Dr Mel Dewitt who he would refer me to Neurologist in [facility] for my short term memory condition.  I was just in to see h

## 2019-12-26 NOTE — TELEPHONE ENCOUNTER
Please assure the patient that I have worked with Dr. Brendon Andersen with other patients and believe her to be an excellent neurologist.  If the patient prefers Dr. Ginna Damon, that is fine.   She also is a good neurologist that I have worked with in the past.

## 2019-12-30 ENCOUNTER — OFFICE VISIT (OUTPATIENT)
Dept: NEUROLOGY | Facility: CLINIC | Age: 69
End: 2019-12-30
Payer: MEDICARE

## 2019-12-30 VITALS
SYSTOLIC BLOOD PRESSURE: 146 MMHG | DIASTOLIC BLOOD PRESSURE: 80 MMHG | BODY MASS INDEX: 37 KG/M2 | RESPIRATION RATE: 14 BRPM | WEIGHT: 213 LBS | HEART RATE: 80 BPM

## 2019-12-30 DIAGNOSIS — R41.3 POOR SHORT TERM MEMORY: Primary | ICD-10-CM

## 2019-12-30 DIAGNOSIS — F41.9 ANXIETY: ICD-10-CM

## 2019-12-30 PROCEDURE — 99205 OFFICE O/P NEW HI 60 MIN: CPT | Performed by: OTHER

## 2019-12-30 NOTE — PROGRESS NOTES
Pt reports 1 year ago she was in hospital for 3 days for severe vertigo. Pt completed pt for walking while dizzy.     Pt states since this instance she has had memory problems, pt states she will forget conversations at times, but will remember when prom

## 2019-12-30 NOTE — PROGRESS NOTES
Amira 1827   Neurology; INITIAL CLINIC VISIT  2019, 2:27 PM     Donavan Valerio Patient Status:  No patient class for patient encounter    1950 MRN [id number]   [facility], [address] [place] [address] ulcers    • ISIS (obstructive sleep apnea) 04/11/18 PSG    AHI 6 Supine AHI 15 non-supine AHI 2 Sao2 Taj 73%   • Osteoarthritis     knees   • Renal disorder     baloon dilation right renal artery       PAST SURGICAL HISTORY:  Past Surgical History:   Proc 0   • losartan 100 MG Oral Tab Take 1 tablet (100 mg total) by mouth daily. 90 tablet 0   • hydrochlorothiazide 25 MG Oral Tab Take 1 tablet (25 mg total) by mouth daily.  90 tablet 0   • Multiple Vitamins-Minerals (CENTRUM SILVER) Oral Tab Take 1 tablet by place, Mentally appropriate  for age;  Language and speech: language is intact in expression and comprehension, no dysarthria, voice is normal in volume, follow commends well;  Memory and comprehension:  MMSE is normal,   Cranial Nerves       CN II:  Visua MD         Problem List Items Addressed This Visit     Anxiety    Poor short term memory - Primary    Relevant Orders    MRI BRAIN (W+WO) (CPT=70543)    EEG    GALEN, DIRECT, REFLEX TO 9 ENAS    FOLIC ACID SERUM(FOLATE)    RHEUMATOID ARTHRITIS FACTOR    SED

## 2020-01-02 NOTE — TELEPHONE ENCOUNTER
MyChart message from patient stating has MRI scheduled on 1/13/20 and needs pre-medication ordered due to contrast allergy and also requests medication for test anxiety due to claustrophobia.      Rx Prednisone, Rx Benadryl and Rx Lorazepam pended for MD palacios

## 2020-01-02 NOTE — TELEPHONE ENCOUNTER
From: Earline Valerio  To: Kecia Colin MD  Sent: 1/1/2020 6:04 PM CST  Subject: Non-Urgent Medical Question    Hi, I have my MRI schedule for January 13th. I need to have some medication order for me.  I need pre medication due to allergies to contrast

## 2020-01-03 ENCOUNTER — NURSE ONLY (OUTPATIENT)
Dept: ELECTROPHYSIOLOGY | Facility: HOSPITAL | Age: 70
End: 2020-01-03
Attending: Other
Payer: MEDICARE

## 2020-01-03 DIAGNOSIS — R41.3 POOR SHORT TERM MEMORY: ICD-10-CM

## 2020-01-03 PROCEDURE — 95819 EEG AWAKE AND ASLEEP: CPT | Performed by: OTHER

## 2020-01-06 NOTE — PROCEDURES
[address]      PATIENT'S NAME: Winifred WHITFIELD   ATTENDING PHYSICIAN: Jose Sanon M.D.    PATIENT ACCOUNT #: [de-identified] LOCATION: [facility]   MEDICAL RECORD #: [id number] DATE OF BIRTH: [birth year]

## 2020-01-07 ENCOUNTER — APPOINTMENT (OUTPATIENT)
Dept: LAB | Age: 70
End: 2020-01-07
Attending: Other
Payer: MEDICARE

## 2020-01-07 ENCOUNTER — TELEPHONE (OUTPATIENT)
Dept: NEUROLOGY | Facility: CLINIC | Age: 70
End: 2020-01-07

## 2020-01-07 DIAGNOSIS — R41.3 POOR SHORT TERM MEMORY: ICD-10-CM

## 2020-01-07 LAB
FOLATE SERPL-MCNC: 48.7 NG/ML (ref 8.7–?)
RHEUMATOID FACT SERPL-ACNC: <10 IU/ML (ref ?–15)
SED RATE-ML: 11 MM/HR (ref 0–25)
TSI SER-ACNC: 2.49 MIU/ML (ref 0.36–3.74)
VIT B12 SERPL-MCNC: 938 PG/ML (ref 193–986)

## 2020-01-07 PROCEDURE — 36415 COLL VENOUS BLD VENIPUNCTURE: CPT

## 2020-01-07 PROCEDURE — 82746 ASSAY OF FOLIC ACID SERUM: CPT

## 2020-01-07 PROCEDURE — 82607 VITAMIN B-12: CPT

## 2020-01-07 PROCEDURE — 84443 ASSAY THYROID STIM HORMONE: CPT

## 2020-01-07 PROCEDURE — 84207 ASSAY OF VITAMIN B-6: CPT

## 2020-01-07 PROCEDURE — 86431 RHEUMATOID FACTOR QUANT: CPT

## 2020-01-07 PROCEDURE — 86038 ANTINUCLEAR ANTIBODIES: CPT

## 2020-01-07 PROCEDURE — 85652 RBC SED RATE AUTOMATED: CPT

## 2020-01-07 NOTE — TELEPHONE ENCOUNTER
Relayed below results via [employer]    ----- Message from Gurwinder Be MD sent at 1/6/2020  3:52 PM CST -----  EEG normal

## 2020-01-08 LAB — ANA SCREEN: NEGATIVE

## 2020-01-10 ENCOUNTER — APPOINTMENT (OUTPATIENT)
Dept: LAB | Age: 70
End: 2020-01-10
Attending: Other
Payer: MEDICARE

## 2020-01-10 ENCOUNTER — TELEPHONE (OUTPATIENT)
Dept: NEUROLOGY | Facility: CLINIC | Age: 70
End: 2020-01-10

## 2020-01-10 ENCOUNTER — HOSPITAL ENCOUNTER (OUTPATIENT)
Dept: MRI IMAGING | Facility: HOSPITAL | Age: 70
Discharge: HOME | End: 2020-01-10
Attending: Other
Payer: MEDICARE

## 2020-01-10 DIAGNOSIS — R41.3 POOR SHORT TERM MEMORY: ICD-10-CM

## 2020-01-10 LAB — VITAMIN B6: 235.5 NMOL/L

## 2020-01-10 PROCEDURE — 84446 ASSAY OF VITAMIN E: CPT

## 2020-01-10 PROCEDURE — A9575 INJ GADOTERATE MEGLUMI 0.1ML: HCPCS | Performed by: OTHER

## 2020-01-10 PROCEDURE — 36415 COLL VENOUS BLD VENIPUNCTURE: CPT

## 2020-01-10 PROCEDURE — 70553 MRI BRAIN STEM W/O & W/DYE: CPT | Performed by: OTHER

## 2020-01-10 NOTE — TELEPHONE ENCOUNTER
Relayed below results to pt and informed her to seek a multivitamin without B6 and to stop taking any other supplements containin B6.  Verbalized understanding, no further questions.      ----- Message from Júnior Fenton MD sent at 1/10/2020 12:46 PM CST -----

## 2020-01-13 ENCOUNTER — TELEPHONE (OUTPATIENT)
Dept: NEUROLOGY | Facility: CLINIC | Age: 70
End: 2020-01-13

## 2020-01-13 LAB
ALPHA-TOCOPHEROL (VIT E) -MG/L: 13.4 MG/L
GAMMA-TOCOPHEROL (VIT E) -MG/L: 0.5 MG/L

## 2020-01-13 NOTE — TELEPHONE ENCOUNTER
----- Message from Lew Marr MD sent at 1/13/2020  8:51 AM CST -----  MRI brain no change, will review film at visit

## 2020-01-14 ENCOUNTER — TELEPHONE (OUTPATIENT)
Dept: FAMILY MEDICINE CLINIC | Facility: CLINIC | Age: 70
End: 2020-01-14

## 2020-01-14 RX ORDER — FAMOTIDINE 20 MG/1
20 TABLET ORAL 2 TIMES DAILY
Qty: 180 TABLET | Refills: 0 | Status: SHIPPED | OUTPATIENT
Start: 2020-01-14 | End: 2020-04-07

## 2020-01-14 NOTE — TELEPHONE ENCOUNTER
[address] [place] [address],[address] [place] [address]. Pt takes Ranitidine 150 mg bid. It is on back order. They want to switch her to Famotidine 40 mg ? Would she take this bid since she was on the ranitidine bid? Sharath Rdoríguez Are you ok with this change?    She is also taking losartan/ HCTZ 100/

## 2020-01-15 ENCOUNTER — TELEPHONE (OUTPATIENT)
Dept: NEUROLOGY | Facility: CLINIC | Age: 70
End: 2020-01-15

## 2020-01-15 NOTE — TELEPHONE ENCOUNTER
Lab results left on VM (ok per HIPAA consent).       -- Message from Shantelle Rodriguez MD sent at 1/14/2020  8:41 AM CST -----  Normal lab

## 2020-01-16 RX ORDER — LOSARTAN POTASSIUM 100 MG/1
100 TABLET ORAL DAILY
Qty: 90 TABLET | Refills: 0 | OUTPATIENT
Start: 2020-01-16 | End: 2020-04-07

## 2020-01-16 RX ORDER — HYDROCHLOROTHIAZIDE 25 MG/1
25 TABLET ORAL DAILY
Qty: 90 TABLET | Refills: 0 | OUTPATIENT
Start: 2020-01-16 | End: 2020-04-15

## 2020-02-19 DIAGNOSIS — E78.00 HYPERCHOLESTEREMIA: ICD-10-CM

## 2020-02-19 RX ORDER — ROSUVASTATIN CALCIUM 40 MG/1
TABLET, COATED ORAL
Qty: 90 TABLET | Refills: 0 | Status: SHIPPED | OUTPATIENT
Start: 2020-02-19 | End: 2020-05-28

## 2020-02-24 ENCOUNTER — TELEPHONE (OUTPATIENT)
Dept: NEUROLOGY | Facility: CLINIC | Age: 70
End: 2020-02-24

## 2020-02-24 ENCOUNTER — APPOINTMENT (OUTPATIENT)
Dept: LAB | Age: 70
End: 2020-02-24
Attending: ORTHOPAEDIC SURGERY
Payer: MEDICARE

## 2020-02-24 DIAGNOSIS — R89.9 ABNORMAL LABORATORY TEST RESULT: ICD-10-CM

## 2020-02-24 DIAGNOSIS — M18.11 PRIMARY OSTEOARTHRITIS OF FIRST CARPOMETACARPAL JOINT OF RIGHT HAND: ICD-10-CM

## 2020-02-24 DIAGNOSIS — M67.431 GANGLION CYST OF VOLAR ASPECT OF RIGHT WRIST: ICD-10-CM

## 2020-02-24 LAB
ANION GAP SERPL CALC-SCNC: 7 MMOL/L (ref 0–18)
BUN BLD-MCNC: 19 MG/DL (ref 7–18)
BUN/CREAT SERPL: 16.2 (ref 10–20)
CALCIUM BLD-MCNC: 9.4 MG/DL (ref 8.5–10.1)
CHLORIDE SERPL-SCNC: 105 MMOL/L (ref 98–112)
CO2 SERPL-SCNC: 26 MMOL/L (ref 21–32)
CREAT BLD-MCNC: 1.17 MG/DL (ref 0.55–1.02)
GLUCOSE BLD-MCNC: 113 MG/DL (ref 70–99)
OSMOLALITY SERPL CALC.SUM OF ELEC: 289 MOSM/KG (ref 275–295)
PATIENT FASTING Y/N/NP: YES
POTASSIUM SERPL-SCNC: 3.6 MMOL/L (ref 3.5–5.1)
SODIUM SERPL-SCNC: 138 MMOL/L (ref 136–145)

## 2020-02-24 PROCEDURE — 80048 BASIC METABOLIC PNL TOTAL CA: CPT

## 2020-02-24 PROCEDURE — 84207 ASSAY OF VITAMIN B-6: CPT

## 2020-02-24 PROCEDURE — 36415 COLL VENOUS BLD VENIPUNCTURE: CPT

## 2020-02-26 LAB — VITAMIN B6: 39.5 NMOL/L

## 2020-02-27 ENCOUNTER — TELEPHONE (OUTPATIENT)
Dept: NEUROLOGY | Facility: CLINIC | Age: 70
End: 2020-02-27

## 2020-02-27 NOTE — TELEPHONE ENCOUNTER
----- Message from Michela Pierre MD sent at 2/27/2020  3:00 PM CST -----  V6 is normal now, just take one multiple vitamin is good evough

## 2020-02-28 NOTE — TELEPHONE ENCOUNTER
Left detailed message on confidential VM (ok per HIPAA) with below. Office hours and contact information provided.

## 2020-03-02 ENCOUNTER — PATIENT MESSAGE (OUTPATIENT)
Dept: FAMILY MEDICINE CLINIC | Facility: CLINIC | Age: 70
End: 2020-03-02

## 2020-03-02 DIAGNOSIS — I10 BENIGN ESSENTIAL HYPERTENSION: Primary | ICD-10-CM

## 2020-03-02 NOTE — TELEPHONE ENCOUNTER
Creatinine is not too dissimilar to previous values. If patient desires she can hydrate herself and then repeat a BMP.

## 2020-03-02 NOTE — TELEPHONE ENCOUNTER
From: Marques Valerio  To: Mariano Melgar MD  Sent: 3/2/2020 10:03 AM CST  Subject: Test Results Question    I have a couple of concerns. 1. I am having surgery this Friday with Dr Nathanael Day for my right hand and wrist. He did a EKG & BMP pre op.  I d

## 2020-03-05 PROBLEM — E53.1: Status: ACTIVE | Noted: 2020-03-05

## 2020-03-05 NOTE — PROGRESS NOTES
Amira 1827   Neurology;   Follow up  CLINIC VISIT  3/5/2020    Michele Valerio Patient Status:  No patient class for patient encounter    1950 MRN [id number]   Location [facility], [address] [place] [address], [place] [address] better, no anxiety. She is going to have R. hand surgery done soon.        PAST MEDICAL HISTORY:  Past Medical History:   Diagnosis Date   • Pepper's esophagus    • Diverticulosis of large intestine    • Esophageal reflux    • High blood pressure    • High Rosuvastatin Calcium 40 MG Oral Tab TAKE ONE TABLET BY MOUTH every night 90 tablet 0   • losartan 100 MG Oral Tab Take 1 tablet (100 mg total) by mouth daily. 90 tablet 0   • hydrochlorothiazide 25 MG Oral Tab Take 1 tablet (25 mg total) by mouth daily.  80 normal  Lungs are clear to auscultation  Heart: normal SR, no murmur  Extremities:  No edema or cyanosis, radial and pedal pulse is normal.  Skin:  No unusual lesions    Neurologic Examination:  Mental status: he is awake, alert, oriented to time, name and is indicated.     Dictated By Rickey Singh M.D.  d:     01/06/2020 12:42:33  t:      01/06/2020 13:45:00  Job   [id number]  /      Problem List Items Addressed This Visit     Anxiety - Primary    Inadequate vitamin B6 intake    Poor short term me

## 2020-03-05 NOTE — PROGRESS NOTES
Patient states her short term memory has improved. Patient states her sleeping has improved and her anxiousness has decreased.

## 2020-03-06 PROCEDURE — 88304 TISSUE EXAM BY PATHOLOGIST: CPT | Performed by: ORTHOPAEDIC SURGERY

## 2020-03-15 DIAGNOSIS — N95.1 FLUSHING, MENOPAUSAL: ICD-10-CM

## 2020-03-15 DIAGNOSIS — Z78.0 POSTMENOPAUSAL: ICD-10-CM

## 2020-03-15 RX ORDER — ESTRADIOL 1 MG/1
TABLET ORAL
Qty: 90 TABLET | Refills: 0 | Status: SHIPPED | OUTPATIENT
Start: 2020-03-15 | End: 2020-06-29

## 2020-04-07 RX ORDER — FAMOTIDINE 20 MG/1
TABLET ORAL
Qty: 180 TABLET | Refills: 0 | Status: SHIPPED | OUTPATIENT
Start: 2020-04-07 | End: 2020-07-14

## 2020-04-07 RX ORDER — LOSARTAN POTASSIUM 100 MG/1
TABLET ORAL
Qty: 90 TABLET | Refills: 0 | Status: SHIPPED | OUTPATIENT
Start: 2020-04-07 | End: 2020-07-14

## 2020-04-15 RX ORDER — HYDROCHLOROTHIAZIDE 25 MG/1
TABLET ORAL
Qty: 90 TABLET | Refills: 0 | Status: SHIPPED | OUTPATIENT
Start: 2020-04-15 | End: 2020-07-14

## 2020-05-28 DIAGNOSIS — E78.00 HYPERCHOLESTEREMIA: ICD-10-CM

## 2020-05-28 RX ORDER — ROSUVASTATIN CALCIUM 40 MG/1
TABLET, COATED ORAL
Qty: 90 TABLET | Refills: 0 | Status: SHIPPED | OUTPATIENT
Start: 2020-05-28 | End: 2020-09-08

## 2020-06-27 DIAGNOSIS — Z78.0 POSTMENOPAUSAL: ICD-10-CM

## 2020-06-27 DIAGNOSIS — N95.1 FLUSHING, MENOPAUSAL: ICD-10-CM

## 2020-06-29 RX ORDER — ESTRADIOL 1 MG/1
TABLET ORAL
Qty: 90 TABLET | Refills: 1 | Status: SHIPPED | OUTPATIENT
Start: 2020-06-29 | End: 2020-12-28

## 2020-07-02 DIAGNOSIS — I10 BENIGN ESSENTIAL HYPERTENSION: ICD-10-CM

## 2020-07-06 RX ORDER — CARVEDILOL 6.25 MG/1
6.25 TABLET ORAL 2 TIMES DAILY
Qty: 180 TABLET | Refills: 0 | Status: SHIPPED | OUTPATIENT
Start: 2020-07-06 | End: 2020-09-28

## 2020-07-14 ENCOUNTER — TELEPHONE (OUTPATIENT)
Dept: FAMILY MEDICINE CLINIC | Facility: CLINIC | Age: 70
End: 2020-07-14

## 2020-07-14 RX ORDER — HYDROCHLOROTHIAZIDE 25 MG/1
TABLET ORAL
Qty: 90 TABLET | Refills: 0 | Status: SHIPPED | OUTPATIENT
Start: 2020-07-14 | End: 2020-09-28

## 2020-07-14 RX ORDER — FAMOTIDINE 20 MG/1
TABLET ORAL
Qty: 180 TABLET | Refills: 0 | Status: SHIPPED | OUTPATIENT
Start: 2020-07-14 | End: 2020-07-15 | Stop reason: RX

## 2020-07-14 RX ORDER — LOSARTAN POTASSIUM 100 MG/1
TABLET ORAL
Qty: 90 TABLET | Refills: 0 | Status: SHIPPED | OUTPATIENT
Start: 2020-07-14 | End: 2020-10-15

## 2020-07-14 NOTE — TELEPHONE ENCOUNTER
Pt states [facility] called and said they do not have this medication in stock and do not know when it will be in.       FAMOTIDINE 20 MG Oral Tab 180 tablet 0 7/14/2020    Sig:   TAKE ONE TABLET BY MOUTH TWICE DAILY     Route:   (none)     Order #:

## 2020-07-14 NOTE — TELEPHONE ENCOUNTER
I s/w sina at [facility]. She reports this rx is out of stock otc and at other pharmacies. Reports they can get cimetidine though which she states is comparable. Reports they can get 200mg, 300mg, 400mg, and 800 mg.     Please advise recommendation for alt

## 2020-07-15 RX ORDER — CIMETIDINE 300 MG/1
300 TABLET, FILM COATED ORAL 2 TIMES DAILY
Qty: 60 TABLET | Refills: 1 | Status: SHIPPED | OUTPATIENT
Start: 2020-07-15 | End: 2020-09-08

## 2020-08-03 ENCOUNTER — OFFICE VISIT (OUTPATIENT)
Dept: FAMILY MEDICINE CLINIC | Facility: CLINIC | Age: 70
End: 2020-08-03
Payer: MEDICARE

## 2020-08-03 VITALS
HEIGHT: 64 IN | TEMPERATURE: 99 F | DIASTOLIC BLOOD PRESSURE: 88 MMHG | HEART RATE: 60 BPM | RESPIRATION RATE: 16 BRPM | SYSTOLIC BLOOD PRESSURE: 140 MMHG | WEIGHT: 201 LBS | BODY MASS INDEX: 34.31 KG/M2

## 2020-08-03 DIAGNOSIS — E78.00 HYPERCHOLESTEREMIA: Primary | ICD-10-CM

## 2020-08-03 DIAGNOSIS — Z13.21 ENCOUNTER FOR VITAMIN DEFICIENCY SCREENING: ICD-10-CM

## 2020-08-03 DIAGNOSIS — R42 VERTIGO: ICD-10-CM

## 2020-08-03 DIAGNOSIS — I10 BENIGN ESSENTIAL HTN: ICD-10-CM

## 2020-08-03 DIAGNOSIS — K22.70 BARRETT'S ESOPHAGUS WITHOUT DYSPLASIA: ICD-10-CM

## 2020-08-03 DIAGNOSIS — Z99.89 OSA ON CPAP: ICD-10-CM

## 2020-08-03 DIAGNOSIS — G47.33 OSA ON CPAP: ICD-10-CM

## 2020-08-03 PROCEDURE — 99214 OFFICE O/P EST MOD 30 MIN: CPT | Performed by: FAMILY MEDICINE

## 2020-08-03 NOTE — PROGRESS NOTES
Matheus Trujillo is a 78year old female. HPI:   Patient presents for recheck of her hypertension. Pt has been taking medications as instructed, no medication side effects, home BP monitoring in the range of 699-635'C systolic and 76'Y diastolic.  Ivonne Saravia TABLET BY MOUTH EVERY DAY 90 tablet 1   • ROSUVASTATIN CALCIUM 40 MG Oral Tab TAKE ONE TABLET BY MOUTH every night 90 tablet 0   • Calcium Carbonate-Vitamin D (CALCIUM 600 + D OR) Take 1 tablet by mouth daily.      • NON FORMULARY          Past Medical Hist 98.6 °F (37 °C)   Resp 16   Ht 64\"   Wt 201 lb (91.2 kg)   BMI 34.50 kg/m²   GENERAL: well developed, well nourished,in no apparent distress  SKIN: no rashes,no suspicious lesions  HEENT: atraumatic, normocephalic,ears and throat are clear  NECK: supple,n

## 2020-08-07 ENCOUNTER — LAB ENCOUNTER (OUTPATIENT)
Dept: LAB | Age: 70
End: 2020-08-07
Attending: FAMILY MEDICINE
Payer: MEDICARE

## 2020-08-07 DIAGNOSIS — I10 BENIGN ESSENTIAL HTN: ICD-10-CM

## 2020-08-07 DIAGNOSIS — I10 BENIGN ESSENTIAL HYPERTENSION: ICD-10-CM

## 2020-08-07 DIAGNOSIS — E78.00 HYPERCHOLESTEREMIA: ICD-10-CM

## 2020-08-07 LAB
ALBUMIN SERPL-MCNC: 3.5 G/DL (ref 3.4–5)
ALBUMIN/GLOB SERPL: 0.9 {RATIO} (ref 1–2)
ALP LIVER SERPL-CCNC: 74 U/L (ref 55–142)
ALT SERPL-CCNC: 20 U/L (ref 13–56)
ANION GAP SERPL CALC-SCNC: 4 MMOL/L (ref 0–18)
AST SERPL-CCNC: 22 U/L (ref 15–37)
BASOPHILS # BLD AUTO: 0.04 X10(3) UL (ref 0–0.2)
BASOPHILS NFR BLD AUTO: 0.6 %
BILIRUB SERPL-MCNC: 0.4 MG/DL (ref 0.1–2)
BUN BLD-MCNC: 17 MG/DL (ref 7–18)
BUN/CREAT SERPL: 13.8 (ref 10–20)
CALCIUM BLD-MCNC: 9.2 MG/DL (ref 8.5–10.1)
CHLORIDE SERPL-SCNC: 104 MMOL/L (ref 98–112)
CHOLEST SMN-MCNC: 167 MG/DL (ref ?–200)
CO2 SERPL-SCNC: 29 MMOL/L (ref 21–32)
CREAT BLD-MCNC: 1.23 MG/DL (ref 0.55–1.02)
DEPRECATED RDW RBC AUTO: 55.2 FL (ref 35.1–46.3)
EOSINOPHIL # BLD AUTO: 0.22 X10(3) UL (ref 0–0.7)
EOSINOPHIL NFR BLD AUTO: 3.3 %
ERYTHROCYTE [DISTWIDTH] IN BLOOD BY AUTOMATED COUNT: 17.6 % (ref 11–15)
GLOBULIN PLAS-MCNC: 4 G/DL (ref 2.8–4.4)
GLUCOSE BLD-MCNC: 99 MG/DL (ref 70–99)
HCT VFR BLD AUTO: 39.1 % (ref 35–48)
HDLC SERPL-MCNC: 78 MG/DL (ref 40–59)
HGB BLD-MCNC: 12 G/DL (ref 12–16)
IMM GRANULOCYTES # BLD AUTO: 0.02 X10(3) UL (ref 0–1)
IMM GRANULOCYTES NFR BLD: 0.3 %
LDLC SERPL CALC-MCNC: 72 MG/DL (ref ?–100)
LYMPHOCYTES # BLD AUTO: 1.96 X10(3) UL (ref 1–4)
LYMPHOCYTES NFR BLD AUTO: 29.4 %
M PROTEIN MFR SERPL ELPH: 7.5 G/DL (ref 6.4–8.2)
MCH RBC QN AUTO: 26.5 PG (ref 26–34)
MCHC RBC AUTO-ENTMCNC: 30.7 G/DL (ref 31–37)
MCV RBC AUTO: 86.5 FL (ref 80–100)
MONOCYTES # BLD AUTO: 0.63 X10(3) UL (ref 0.1–1)
MONOCYTES NFR BLD AUTO: 9.4 %
NEUTROPHILS # BLD AUTO: 3.8 X10 (3) UL (ref 1.5–7.7)
NEUTROPHILS # BLD AUTO: 3.8 X10(3) UL (ref 1.5–7.7)
NEUTROPHILS NFR BLD AUTO: 57 %
NONHDLC SERPL-MCNC: 89 MG/DL (ref ?–130)
OSMOLALITY SERPL CALC.SUM OF ELEC: 286 MOSM/KG (ref 275–295)
PATIENT FASTING Y/N/NP: YES
PATIENT FASTING Y/N/NP: YES
PLATELET # BLD AUTO: 297 10(3)UL (ref 150–450)
POTASSIUM SERPL-SCNC: 3.7 MMOL/L (ref 3.5–5.1)
RBC # BLD AUTO: 4.52 X10(6)UL (ref 3.8–5.3)
SODIUM SERPL-SCNC: 137 MMOL/L (ref 136–145)
TRIGL SERPL-MCNC: 85 MG/DL (ref 30–149)
VLDLC SERPL CALC-MCNC: 17 MG/DL (ref 0–30)
WBC # BLD AUTO: 6.7 X10(3) UL (ref 4–11)

## 2020-08-07 PROCEDURE — 80053 COMPREHEN METABOLIC PANEL: CPT

## 2020-08-07 PROCEDURE — 80061 LIPID PANEL: CPT

## 2020-08-07 PROCEDURE — 85025 COMPLETE CBC W/AUTO DIFF WBC: CPT

## 2020-08-07 PROCEDURE — 36415 COLL VENOUS BLD VENIPUNCTURE: CPT

## 2020-08-10 DIAGNOSIS — I10 BENIGN ESSENTIAL HTN: Primary | ICD-10-CM

## 2020-08-10 DIAGNOSIS — E78.00 HYPERCHOLESTEREMIA: ICD-10-CM

## 2020-09-05 DIAGNOSIS — E78.00 HYPERCHOLESTEREMIA: ICD-10-CM

## 2020-09-08 RX ORDER — CIMETIDINE 300 MG/1
TABLET, FILM COATED ORAL
Qty: 60 TABLET | Refills: 5 | Status: SHIPPED | OUTPATIENT
Start: 2020-09-08 | End: 2021-03-02

## 2020-09-08 RX ORDER — ROSUVASTATIN CALCIUM 40 MG/1
TABLET, COATED ORAL
Qty: 90 TABLET | Refills: 1 | Status: SHIPPED | OUTPATIENT
Start: 2020-09-08 | End: 2020-12-28

## 2020-09-10 ENCOUNTER — PATIENT MESSAGE (OUTPATIENT)
Dept: FAMILY MEDICINE CLINIC | Facility: CLINIC | Age: 70
End: 2020-09-10

## 2020-09-10 ENCOUNTER — PATIENT MESSAGE (OUTPATIENT)
Dept: NEUROLOGY | Facility: CLINIC | Age: 70
End: 2020-09-10

## 2020-09-11 NOTE — TELEPHONE ENCOUNTER
From: Kitty Valerio  To: Jessica Ziegler MD  Sent: 9/10/2020 5:04 PM CDT  Subject: Non-Urgent Medical Question    I have a question which OTC medications would Dr Jani Saenz recommended I can take . Prevagin or Neuriva ?  I feel these may help with

## 2020-09-11 NOTE — TELEPHONE ENCOUNTER
From: Shyanne Valerio  To: Wai Miller MD  Sent: 9/10/2020 5:07 PM CDT  Subject: Non-Urgent Medical Question    I have been doing some research on Prevagin and Neuriva OTC.  I would like to know which you think would be the better of the two to take for m

## 2020-09-14 NOTE — TELEPHONE ENCOUNTER
Sent patient a MyChart response reflecting Dr. Margareth Nath response:    I have no opinion on either one. They are not FDA approved for Memory loss. Maria C Osorio ..

## 2020-09-27 DIAGNOSIS — I10 BENIGN ESSENTIAL HYPERTENSION: ICD-10-CM

## 2020-09-28 RX ORDER — CARVEDILOL 6.25 MG/1
TABLET ORAL
Qty: 180 TABLET | Refills: 0 | Status: SHIPPED | OUTPATIENT
Start: 2020-09-28 | End: 2020-12-28

## 2020-09-28 RX ORDER — HYDROCHLOROTHIAZIDE 25 MG/1
TABLET ORAL
Qty: 90 TABLET | Refills: 0 | Status: SHIPPED | OUTPATIENT
Start: 2020-09-28 | End: 2020-12-28

## 2020-09-30 ENCOUNTER — TELEPHONE (OUTPATIENT)
Dept: FAMILY MEDICINE CLINIC | Facility: CLINIC | Age: 70
End: 2020-09-30

## 2020-09-30 DIAGNOSIS — Z12.31 ENCOUNTER FOR SCREENING MAMMOGRAM FOR BREAST CANCER: Primary | ICD-10-CM

## 2020-09-30 NOTE — TELEPHONE ENCOUNTER
Call to pt's cell to determine if pt has any current breast symptoms/complaints reaches no answer. Record shows last cpx 12/6/2019-no appts scheduled. Last mammogram 9/6/2019 w recommendation to repeat in 12 months  **please advise-thanks!

## 2020-10-12 ENCOUNTER — IMMUNIZATION (OUTPATIENT)
Dept: FAMILY MEDICINE CLINIC | Facility: CLINIC | Age: 70
End: 2020-10-12
Payer: MEDICARE

## 2020-10-12 DIAGNOSIS — Z23 NEED FOR VACCINATION: ICD-10-CM

## 2020-10-12 PROCEDURE — 90662 IIV NO PRSV INCREASED AG IM: CPT | Performed by: FAMILY MEDICINE

## 2020-10-12 PROCEDURE — G0008 ADMIN INFLUENZA VIRUS VAC: HCPCS | Performed by: FAMILY MEDICINE

## 2020-10-14 ENCOUNTER — MED REC SCAN ONLY (OUTPATIENT)
Dept: FAMILY MEDICINE CLINIC | Facility: CLINIC | Age: 70
End: 2020-10-14

## 2020-10-15 ENCOUNTER — HOSPITAL ENCOUNTER (OUTPATIENT)
Dept: MAMMOGRAPHY | Age: 70
Discharge: HOME | End: 2020-10-15
Attending: FAMILY MEDICINE
Payer: MEDICARE

## 2020-10-15 DIAGNOSIS — Z12.31 ENCOUNTER FOR SCREENING MAMMOGRAM FOR BREAST CANCER: ICD-10-CM

## 2020-10-15 PROCEDURE — 77067 SCR MAMMO BI INCL CAD: CPT | Performed by: FAMILY MEDICINE

## 2020-10-15 PROCEDURE — 77063 BREAST TOMOSYNTHESIS BI: CPT | Performed by: FAMILY MEDICINE

## 2020-10-15 RX ORDER — LOSARTAN POTASSIUM 100 MG/1
TABLET ORAL
Qty: 90 TABLET | Refills: 0 | Status: SHIPPED | OUTPATIENT
Start: 2020-10-15 | End: 2020-12-28

## 2020-11-04 ENCOUNTER — HOSPITAL ENCOUNTER (OUTPATIENT)
Dept: CT IMAGING | Facility: HOSPITAL | Age: 70
Discharge: HOME | End: 2020-11-04
Attending: FAMILY MEDICINE

## 2020-11-04 DIAGNOSIS — Z13.6 SCREENING FOR CARDIOVASCULAR CONDITION: ICD-10-CM

## 2020-11-17 PROBLEM — N39.3 SUI (STRESS URINARY INCONTINENCE, FEMALE): Status: ACTIVE | Noted: 2020-11-17

## 2020-12-07 ENCOUNTER — TELEPHONE (OUTPATIENT)
Dept: FAMILY MEDICINE CLINIC | Facility: CLINIC | Age: 70
End: 2020-12-07

## 2020-12-07 NOTE — TELEPHONE ENCOUNTER
Pre op request rec'd  Date of surgery 1.11.21 at [facility] [place]  Dr LEXUS Nova  Procedure: Cystoscopy, Transobturator Mid-Urethral Sling Placement     Pt needs h/p, cmp, EKG    Orders placed by Dr LEXUS Nova. I called pt to confirm if she was je

## 2020-12-28 ENCOUNTER — OFFICE VISIT (OUTPATIENT)
Dept: FAMILY MEDICINE CLINIC | Facility: CLINIC | Age: 70
End: 2020-12-28
Payer: MEDICARE

## 2020-12-28 ENCOUNTER — LAB ENCOUNTER (OUTPATIENT)
Dept: LAB | Age: 70
End: 2020-12-28
Attending: FAMILY MEDICINE
Payer: MEDICARE

## 2020-12-28 VITALS
TEMPERATURE: 98 F | WEIGHT: 205 LBS | HEART RATE: 56 BPM | SYSTOLIC BLOOD PRESSURE: 182 MMHG | DIASTOLIC BLOOD PRESSURE: 110 MMHG | RESPIRATION RATE: 18 BRPM | BODY MASS INDEX: 35 KG/M2 | HEIGHT: 64 IN

## 2020-12-28 DIAGNOSIS — Z78.0 POSTMENOPAUSAL: ICD-10-CM

## 2020-12-28 DIAGNOSIS — E83.52 HYPERCALCEMIA: ICD-10-CM

## 2020-12-28 DIAGNOSIS — I10 UNCONTROLLED HYPERTENSION: Primary | ICD-10-CM

## 2020-12-28 DIAGNOSIS — I10 UNCONTROLLED HYPERTENSION: ICD-10-CM

## 2020-12-28 DIAGNOSIS — N95.1 FLUSHING, MENOPAUSAL: ICD-10-CM

## 2020-12-28 DIAGNOSIS — I10 BENIGN ESSENTIAL HTN: ICD-10-CM

## 2020-12-28 DIAGNOSIS — N39.3 SUI (STRESS URINARY INCONTINENCE, FEMALE): ICD-10-CM

## 2020-12-28 DIAGNOSIS — E78.00 HYPERCHOLESTEREMIA: ICD-10-CM

## 2020-12-28 PROCEDURE — 83835 ASSAY OF METANEPHRINES: CPT

## 2020-12-28 PROCEDURE — 82088 ASSAY OF ALDOSTERONE: CPT

## 2020-12-28 PROCEDURE — 85025 COMPLETE CBC W/AUTO DIFF WBC: CPT

## 2020-12-28 PROCEDURE — 80053 COMPREHEN METABOLIC PANEL: CPT

## 2020-12-28 PROCEDURE — 84244 ASSAY OF RENIN: CPT

## 2020-12-28 PROCEDURE — 36415 COLL VENOUS BLD VENIPUNCTURE: CPT

## 2020-12-28 PROCEDURE — 99214 OFFICE O/P EST MOD 30 MIN: CPT | Performed by: FAMILY MEDICINE

## 2020-12-28 PROCEDURE — 82306 VITAMIN D 25 HYDROXY: CPT

## 2020-12-28 RX ORDER — HYDROCHLOROTHIAZIDE 25 MG/1
25 TABLET ORAL DAILY
Qty: 90 TABLET | Refills: 1 | Status: CANCELLED | OUTPATIENT
Start: 2020-12-28

## 2020-12-28 RX ORDER — ESTRADIOL 1 MG/1
1 TABLET ORAL DAILY
Qty: 90 TABLET | Refills: 1 | Status: SHIPPED | OUTPATIENT
Start: 2020-12-28 | End: 2021-06-16

## 2020-12-28 RX ORDER — LOSARTAN POTASSIUM 100 MG/1
100 TABLET ORAL DAILY
Qty: 90 TABLET | Refills: 1 | Status: CANCELLED | OUTPATIENT
Start: 2020-12-28

## 2020-12-28 RX ORDER — CARVEDILOL 6.25 MG/1
6.25 TABLET ORAL 2 TIMES DAILY
Qty: 180 TABLET | Refills: 1 | Status: SHIPPED | OUTPATIENT
Start: 2020-12-28 | End: 2021-03-23 | Stop reason: ALTCHOICE

## 2020-12-28 RX ORDER — CARVEDILOL 6.25 MG/1
6.25 TABLET ORAL 2 TIMES DAILY
Qty: 180 TABLET | Refills: 1 | Status: CANCELLED | OUTPATIENT
Start: 2020-12-28

## 2020-12-28 RX ORDER — LOSARTAN POTASSIUM 100 MG/1
100 TABLET ORAL DAILY
Qty: 90 TABLET | Refills: 1 | Status: SHIPPED | OUTPATIENT
Start: 2020-12-28 | End: 2021-07-15

## 2020-12-28 RX ORDER — ROSUVASTATIN CALCIUM 40 MG/1
40 TABLET, COATED ORAL NIGHTLY
Qty: 90 TABLET | Refills: 1 | Status: SHIPPED | OUTPATIENT
Start: 2020-12-28 | End: 2021-09-10

## 2020-12-28 RX ORDER — HYDROCHLOROTHIAZIDE 50 MG/1
50 TABLET ORAL DAILY
Qty: 90 TABLET | Refills: 1 | Status: SHIPPED | OUTPATIENT
Start: 2020-12-28 | End: 2021-06-30

## 2020-12-28 NOTE — PROGRESS NOTES
Shell Ponce is a 78year old female who presents for a pre-operative physical exam. Patient is to have ***, to be done by Dr. Meng [facility] at [facility] on ***. HPI:   Pt complains of ***.     Current Outpatient Medications   Medication Sig Dispense Refill   • N/A 12/11/2017    Performed by Keely Link MD at [facility] ENDOSCOPY   • ESOPHAGOGASTRODUODENOSCOPY (EGD) N/A 12/11/2017    Performed by Keely Link MD at [address] [place] [address] 3/6/2020    Performed by Jared Keith MD at [facility] lb (93 kg)   BMI 35.19 kg/m²   GENERAL: well developed, well nourished,in no apparent distress  SKIN: no rashes,no suspicious lesions  HEENT: atraumatic, normocephalic,ears and throat are clear  EYES:PERRLA, EOMI, normal optic disk,conjunctiva are clear  N

## 2020-12-29 NOTE — PROGRESS NOTES
Shell Ponce is a 78year old female. HPI:   Patient is a 71-year-old female who was originally here for a preoperative clearance visit. However her blood pressure was noted to be elevated repeatedly at this visit.   Her past history is remarkable OF SYSTEMS:   GENERAL HEALTH: feels well otherwise  SKIN: denies any unusual skin lesions or rashes  RESPIRATORY: denies shortness of breath with exertion  CARDIOVASCULAR: denies chest pain on exertion  GI: denies abdominal pain and denies heartburn  NEURO

## 2021-01-01 ENCOUNTER — EXTERNAL RECORD (OUTPATIENT)
Dept: HEALTH INFORMATION MANAGEMENT | Facility: OTHER | Age: 71
End: 2021-01-01

## 2021-01-02 LAB
ALDOSTERONE/RENIN ACTIVITY RATIO: 21.6 RATIO
ALDOSTERONE: 10.8 NG/DL
METANEPHRINE: 0.22 NMOL/L
NORMETANEPHRINE: 0.77 NMOL/L
RENIN ACTIVITY: 0.5 NG/ML/HR

## 2021-01-05 ENCOUNTER — TELEPHONE (OUTPATIENT)
Dept: FAMILY MEDICINE CLINIC | Facility: CLINIC | Age: 71
End: 2021-01-05

## 2021-01-07 ENCOUNTER — PATIENT MESSAGE (OUTPATIENT)
Dept: FAMILY MEDICINE CLINIC | Facility: CLINIC | Age: 71
End: 2021-01-07

## 2021-01-07 NOTE — TELEPHONE ENCOUNTER
From: Wyatt Valerio  To: Efra Marshall MD  Sent: 1/7/2021 11:40 AM CST  Subject: Visit Follow-up Question    I have been recording my BP for past few days and here are the readings.  All of these are after 25 minute walk with my dog AM, then rest

## 2021-01-12 ENCOUNTER — LAB ENCOUNTER (OUTPATIENT)
Dept: LAB | Age: 71
End: 2021-01-12
Attending: FAMILY MEDICINE
Payer: MEDICARE

## 2021-01-12 ENCOUNTER — HOSPITAL ENCOUNTER (OUTPATIENT)
Dept: ULTRASOUND IMAGING | Age: 71
Discharge: HOME | End: 2021-01-12
Attending: FAMILY MEDICINE
Payer: MEDICARE

## 2021-01-12 DIAGNOSIS — I10 UNCONTROLLED HYPERTENSION: ICD-10-CM

## 2021-01-12 DIAGNOSIS — E83.52 HYPERCALCEMIA: ICD-10-CM

## 2021-01-12 LAB — PTH-INTACT SERPL-MCNC: 71.1 PG/ML (ref 18.5–88)

## 2021-01-12 PROCEDURE — 76775 US EXAM ABDO BACK WALL LIM: CPT | Performed by: FAMILY MEDICINE

## 2021-01-12 PROCEDURE — 93975 VASCULAR STUDY: CPT | Performed by: FAMILY MEDICINE

## 2021-01-12 PROCEDURE — 36415 COLL VENOUS BLD VENIPUNCTURE: CPT

## 2021-01-12 PROCEDURE — 83970 ASSAY OF PARATHORMONE: CPT

## 2021-01-13 ENCOUNTER — TELEPHONE (OUTPATIENT)
Dept: FAMILY MEDICINE CLINIC | Facility: CLINIC | Age: 71
End: 2021-01-13

## 2021-01-13 DIAGNOSIS — R93.429 ABNORMAL RENAL ULTRASOUND: Primary | ICD-10-CM

## 2021-01-15 ENCOUNTER — PATIENT MESSAGE (OUTPATIENT)
Dept: FAMILY MEDICINE CLINIC | Facility: CLINIC | Age: 71
End: 2021-01-15

## 2021-01-18 DIAGNOSIS — I70.1 RENAL ARTERY STENOSIS (HCC): ICD-10-CM

## 2021-01-18 DIAGNOSIS — R93.429 ABNORMAL RENAL ULTRASOUND: Primary | ICD-10-CM

## 2021-01-18 NOTE — TELEPHONE ENCOUNTER
From: Ivan Valerio  To: Esteban Rodriguez MD  Sent: 1/15/2021 6:24 PM CST  Subject: Test Results Question    I am inquiring about the ultrasound results if you have a chance to review it?

## 2021-01-21 ENCOUNTER — HOSPITAL ENCOUNTER (OUTPATIENT)
Dept: MRI IMAGING | Facility: HOSPITAL | Age: 71
Discharge: HOME | End: 2021-01-21
Attending: FAMILY MEDICINE
Payer: MEDICARE

## 2021-01-21 DIAGNOSIS — I70.1 RENAL ARTERY STENOSIS (HCC): ICD-10-CM

## 2021-01-21 DIAGNOSIS — R93.429 ABNORMAL RENAL ULTRASOUND: ICD-10-CM

## 2021-01-21 PROCEDURE — 74185 MRA ABD W OR W/O CNTRST: CPT | Performed by: FAMILY MEDICINE

## 2021-01-21 PROCEDURE — A9575 INJ GADOTERATE MEGLUMI 0.1ML: HCPCS | Performed by: FAMILY MEDICINE

## 2021-02-01 ENCOUNTER — OFFICE VISIT (OUTPATIENT)
Dept: FAMILY MEDICINE CLINIC | Facility: CLINIC | Age: 71
End: 2021-02-01
Payer: MEDICARE

## 2021-02-01 VITALS
RESPIRATION RATE: 16 BRPM | BODY MASS INDEX: 34.83 KG/M2 | DIASTOLIC BLOOD PRESSURE: 90 MMHG | HEART RATE: 64 BPM | WEIGHT: 204 LBS | HEIGHT: 64 IN | TEMPERATURE: 98 F | SYSTOLIC BLOOD PRESSURE: 146 MMHG

## 2021-02-01 DIAGNOSIS — N28.9 RENAL INSUFFICIENCY: ICD-10-CM

## 2021-02-01 DIAGNOSIS — Z99.89 OSA ON CPAP: ICD-10-CM

## 2021-02-01 DIAGNOSIS — I10 BENIGN ESSENTIAL HYPERTENSION: ICD-10-CM

## 2021-02-01 DIAGNOSIS — E78.5 DYSLIPIDEMIA: ICD-10-CM

## 2021-02-01 DIAGNOSIS — Z00.00 ENCOUNTER FOR ANNUAL HEALTH EXAMINATION: Primary | ICD-10-CM

## 2021-02-01 DIAGNOSIS — K22.70 BARRETT'S ESOPHAGUS WITHOUT DYSPLASIA: ICD-10-CM

## 2021-02-01 DIAGNOSIS — G47.33 OSA ON CPAP: ICD-10-CM

## 2021-02-01 PROCEDURE — 99213 OFFICE O/P EST LOW 20 MIN: CPT | Performed by: FAMILY MEDICINE

## 2021-02-01 PROCEDURE — G0439 PPPS, SUBSEQ VISIT: HCPCS | Performed by: FAMILY MEDICINE

## 2021-02-01 NOTE — PROGRESS NOTES
HPI:   Gabby Tolentino is a 78year old female who presents for a Medicare Subsequent Annual Wellness visit (Pt already had Initial Annual Wellness). Patient states that overall she is feeling well.    She is also known to have hypertension and hyp products; and sulfa antibiotics. CURRENT MEDICATIONS:     •  Apoaequorin 10 MG Oral Cap, Daily generic for prevagen     •  Rosuvastatin Calcium 40 MG Oral Tab, Take 1 tablet (40 mg total) by mouth nightly.     •  estradiol 1 MG Oral Tab, Take 1 tablet (1 discharge or itching, no complaint of urinary incontinence   MUSCULOSKELETAL: denies back pain  NEURO: denies headaches  PSYCHE: denies depression or anxiety  HEMATOLOGIC: denies hx of anemia  ENDOCRINE: denies thyroid history  ALL/ASTHMA: denies hx of all • Depo-Medrol 40mg Inj 08/22/2017   • Fluad High dose 72 yr and older (92211) 10/12/2017   • Hyaluronan Or Derivative, Monovisc 10/16/2017       ASSESSMENT AND OTHER RELEVANT CHRONIC CONDITIONS:   Janet Baker is a 78year old female who presents In the past six months, have you lost more than 10 pounds without trying?: 2 - No    Has your appetite been poor?: No    How does the patient maintain a good energy level?: Daily Walks    How would you describe your daily physical activity?: Light    Ho in Charting, test patient and document. Then, refresh your progress note to see your input here.   Cognitive Assessment     What day of the week is this?: Correct    What month is it?: Correct    What year is it?: Correct    Recall \"Ball\": Correct    R Update Health Maintenance if applicable     Immunizations (Update Immunization Activity if applicable)     Influenza  Covered Annually 10/12/2020 Please get every year    Pneumococcal 15 (Prevnar)  Covered Once after 65 No vaccine history found Please get flowsheet data found. No flowsheet data found. COPD      Spirometry Testing Annually Spirometry date:  No flowsheet data found.          Template: SPE[employer] MEDICARE ANNUAL ASSESSMENT FEMALE [64242]

## 2021-02-02 NOTE — PATIENT INSTRUCTIONS
Valentin Valerio's SCREENING SCHEDULE   Tests on this list are recommended by your physician but may not be covered, or covered at this frequency, by your insurer. Please check with your insurance carrier before scheduling to verify coverage.    PREVEN 10 years- more often if abnormal Colonoscopy due on 12/11/2022 Update [facility] if applicable    Flex Sigmoidoscopy Screen  Covered every 5 years No results found for this or any previous visit. No flowsheet data found.      Fecal Occult Blood   Co Orders placed or performed in visit on 11/12/19   • PNEUMOCOCCAL VACC, 13 SATINDER IM    Please get once after your 65th birthday    Pneumococcal 23 (Pneumovax)  Covered Once after 65 Orders placed or performed in visit on 10/04/18   • PNEUMOCOCCAL IMM ([facility]

## 2021-02-03 ENCOUNTER — OFFICE VISIT (OUTPATIENT)
Dept: CARDIOLOGY | Age: 71
End: 2021-02-03

## 2021-02-03 VITALS
DIASTOLIC BLOOD PRESSURE: 102 MMHG | SYSTOLIC BLOOD PRESSURE: 164 MMHG | BODY MASS INDEX: 34.16 KG/M2 | HEART RATE: 60 BPM | WEIGHT: 205 LBS | HEIGHT: 65 IN

## 2021-02-03 DIAGNOSIS — G47.33 OSA (OBSTRUCTIVE SLEEP APNEA): ICD-10-CM

## 2021-02-03 DIAGNOSIS — I10 ESSENTIAL HYPERTENSION: Primary | ICD-10-CM

## 2021-02-03 DIAGNOSIS — I77.3 FIBROMUSCULAR DYSPLASIA (CMD): ICD-10-CM

## 2021-02-03 PROCEDURE — 99215 OFFICE O/P EST HI 40 MIN: CPT | Performed by: INTERNAL MEDICINE

## 2021-02-03 RX ORDER — CIMETIDINE 300 MG/1
TABLET, FILM COATED ORAL
COMMUNITY
Start: 2020-09-08

## 2021-02-03 RX ORDER — HYDROCHLOROTHIAZIDE 50 MG/1
50 TABLET ORAL DAILY
COMMUNITY
Start: 2020-12-28

## 2021-02-03 RX ORDER — ESTRADIOL 1 MG/1
1 TABLET ORAL
COMMUNITY
Start: 2020-12-28

## 2021-02-03 RX ORDER — CARVEDILOL 6.25 MG/1
6.25 TABLET ORAL 2 TIMES DAILY
COMMUNITY
Start: 2018-04-05 | End: 2021-06-01 | Stop reason: DRUGHIGH

## 2021-02-03 RX ORDER — ROSUVASTATIN CALCIUM 40 MG/1
40 TABLET, COATED ORAL
COMMUNITY
Start: 2017-11-01

## 2021-02-03 RX ORDER — LOSARTAN POTASSIUM 100 MG/1
100 TABLET ORAL DAILY
COMMUNITY
Start: 2020-12-28

## 2021-02-03 SDOH — SOCIAL STABILITY: SOCIAL NETWORK: ARE YOU MARRIED, WIDOWED, DIVORCED, SEPARATED, NEVER MARRIED, OR LIVING WITH A PARTNER?: WIDOWED

## 2021-02-03 SDOH — HEALTH STABILITY: PHYSICAL HEALTH: ON AVERAGE, HOW MANY DAYS PER WEEK DO YOU ENGAGE IN MODERATE TO STRENUOUS EXERCISE (LIKE A BRISK WALK)?: 7 DAYS

## 2021-02-03 SDOH — HEALTH STABILITY: PHYSICAL HEALTH: ON AVERAGE, HOW MANY MINUTES DO YOU ENGAGE IN EXERCISE AT THIS LEVEL?: 30 MIN

## 2021-02-03 ASSESSMENT — PATIENT HEALTH QUESTIONNAIRE - PHQ9
SUM OF ALL RESPONSES TO PHQ9 QUESTIONS 1 AND 2: 0
1. LITTLE INTEREST OR PLEASURE IN DOING THINGS: NOT AT ALL
2. FEELING DOWN, DEPRESSED OR HOPELESS: NOT AT ALL
CLINICAL INTERPRETATION OF PHQ2 SCORE: NO FURTHER SCREENING NEEDED
SUM OF ALL RESPONSES TO PHQ9 QUESTIONS 1 AND 2: 0
CLINICAL INTERPRETATION OF PHQ9 SCORE: NO FURTHER SCREENING NEEDED

## 2021-03-01 ENCOUNTER — LAB REQUISITION (OUTPATIENT)
Dept: LAB | Facility: HOSPITAL | Age: 71
End: 2021-03-01
Payer: MEDICARE

## 2021-03-01 DIAGNOSIS — N39.3 STRESS INCONTINENCE (FEMALE) (MALE): ICD-10-CM

## 2021-03-01 PROCEDURE — 88305 TISSUE EXAM BY PATHOLOGIST: CPT | Performed by: UROLOGY

## 2021-03-02 RX ORDER — CIMETIDINE 300 MG/1
TABLET, FILM COATED ORAL
Qty: 60 TABLET | Refills: 5 | Status: SHIPPED | OUTPATIENT
Start: 2021-03-02 | End: 2021-12-13

## 2021-03-09 DIAGNOSIS — Z23 NEED FOR VACCINATION: ICD-10-CM

## 2021-03-11 ENCOUNTER — LAB ENCOUNTER (OUTPATIENT)
Dept: LAB | Age: 71
End: 2021-03-11
Attending: FAMILY MEDICINE
Payer: MEDICARE

## 2021-03-11 DIAGNOSIS — I10 BENIGN ESSENTIAL HTN: ICD-10-CM

## 2021-03-11 DIAGNOSIS — E78.00 HYPERCHOLESTEREMIA: ICD-10-CM

## 2021-03-11 LAB
ALBUMIN SERPL-MCNC: 3.5 G/DL (ref 3.4–5)
ALBUMIN/GLOB SERPL: 0.9 {RATIO} (ref 1–2)
ALP LIVER SERPL-CCNC: 67 U/L
ALT SERPL-CCNC: 27 U/L
ANION GAP SERPL CALC-SCNC: 6 MMOL/L (ref 0–18)
AST SERPL-CCNC: 24 U/L (ref 15–37)
BILIRUB SERPL-MCNC: 0.5 MG/DL (ref 0.1–2)
BUN BLD-MCNC: 20 MG/DL (ref 7–18)
BUN/CREAT SERPL: 16.7 (ref 10–20)
CALCIUM BLD-MCNC: 10.2 MG/DL (ref 8.5–10.1)
CHLORIDE SERPL-SCNC: 104 MMOL/L (ref 98–112)
CHOLEST SMN-MCNC: 165 MG/DL (ref ?–200)
CO2 SERPL-SCNC: 29 MMOL/L (ref 21–32)
CREAT BLD-MCNC: 1.2 MG/DL
GLOBULIN PLAS-MCNC: 3.8 G/DL (ref 2.8–4.4)
GLUCOSE BLD-MCNC: 102 MG/DL (ref 70–99)
HDLC SERPL-MCNC: 77 MG/DL (ref 40–59)
LDLC SERPL CALC-MCNC: 73 MG/DL (ref ?–100)
M PROTEIN MFR SERPL ELPH: 7.3 G/DL (ref 6.4–8.2)
NONHDLC SERPL-MCNC: 88 MG/DL (ref ?–130)
OSMOLALITY SERPL CALC.SUM OF ELEC: 291 MOSM/KG (ref 275–295)
PATIENT FASTING Y/N/NP: YES
PATIENT FASTING Y/N/NP: YES
POTASSIUM SERPL-SCNC: 3.8 MMOL/L (ref 3.5–5.1)
SODIUM SERPL-SCNC: 139 MMOL/L (ref 136–145)
TRIGL SERPL-MCNC: 75 MG/DL (ref 30–149)
VLDLC SERPL CALC-MCNC: 15 MG/DL (ref 0–30)

## 2021-03-11 PROCEDURE — 80061 LIPID PANEL: CPT

## 2021-03-11 PROCEDURE — 80053 COMPREHEN METABOLIC PANEL: CPT

## 2021-03-11 PROCEDURE — 36415 COLL VENOUS BLD VENIPUNCTURE: CPT

## 2021-03-12 ENCOUNTER — LAB ENCOUNTER (OUTPATIENT)
Dept: LAB | Age: 71
End: 2021-03-12
Attending: FAMILY MEDICINE
Payer: MEDICARE

## 2021-03-12 DIAGNOSIS — E55.9 VITAMIN D DEFICIENCY: ICD-10-CM

## 2021-03-12 LAB — VIT D+METAB SERPL-MCNC: 28.2 NG/ML (ref 30–100)

## 2021-03-12 PROCEDURE — 82306 VITAMIN D 25 HYDROXY: CPT

## 2021-03-12 PROCEDURE — 36415 COLL VENOUS BLD VENIPUNCTURE: CPT

## 2021-03-12 NOTE — PROGRESS NOTES
Post op sling  Future Appointments  3/23/2021  11:30 AM   Radha Simon PA       [facility] URO         [facility]  9/1/2021   9:40 AM    Claeb Roger NP       [facility]              [facility] WIN

## 2021-03-15 DIAGNOSIS — Z23 NEED FOR VACCINATION: ICD-10-CM

## 2021-03-16 DIAGNOSIS — E55.9 VITAMIN D DEFICIENCY: Primary | ICD-10-CM

## 2021-06-01 ENCOUNTER — TELEPHONE (OUTPATIENT)
Dept: CARDIOLOGY | Age: 71
End: 2021-06-01

## 2021-06-01 ENCOUNTER — APPOINTMENT (OUTPATIENT)
Dept: CARDIOLOGY | Age: 71
End: 2021-06-01

## 2021-06-01 RX ORDER — CARVEDILOL 3.12 MG/1
3.12 TABLET ORAL 2 TIMES DAILY WITH MEALS
Qty: 60 TABLET | Refills: 11 | Status: SHIPPED | OUTPATIENT
Start: 2021-06-01

## 2021-06-15 DIAGNOSIS — N95.1 FLUSHING, MENOPAUSAL: ICD-10-CM

## 2021-06-15 DIAGNOSIS — Z78.0 POSTMENOPAUSAL: ICD-10-CM

## 2021-06-16 RX ORDER — ESTRADIOL 1 MG/1
TABLET ORAL
Qty: 90 TABLET | Refills: 1 | Status: SHIPPED | OUTPATIENT
Start: 2021-06-16 | End: 2021-12-20

## 2021-06-24 ENCOUNTER — OFFICE VISIT (OUTPATIENT)
Dept: FAMILY MEDICINE CLINIC | Facility: CLINIC | Age: 71
End: 2021-06-24
Payer: MEDICARE

## 2021-06-24 ENCOUNTER — HOSPITAL ENCOUNTER (OUTPATIENT)
Dept: GENERAL RADIOLOGY | Age: 71
Discharge: HOME | End: 2021-06-24
Attending: FAMILY MEDICINE
Payer: MEDICARE

## 2021-06-24 VITALS
RESPIRATION RATE: 18 BRPM | SYSTOLIC BLOOD PRESSURE: 140 MMHG | HEART RATE: 68 BPM | HEIGHT: 64 IN | TEMPERATURE: 99 F | BODY MASS INDEX: 35 KG/M2 | DIASTOLIC BLOOD PRESSURE: 84 MMHG | WEIGHT: 205 LBS

## 2021-06-24 DIAGNOSIS — G89.29 CHRONIC RIGHT HIP PAIN: Primary | ICD-10-CM

## 2021-06-24 DIAGNOSIS — K44.9 HIATAL HERNIA WITH GASTROESOPHAGEAL REFLUX: ICD-10-CM

## 2021-06-24 DIAGNOSIS — M25.551 CHRONIC RIGHT HIP PAIN: Primary | ICD-10-CM

## 2021-06-24 DIAGNOSIS — G89.29 CHRONIC RIGHT HIP PAIN: ICD-10-CM

## 2021-06-24 DIAGNOSIS — M25.551 CHRONIC RIGHT HIP PAIN: ICD-10-CM

## 2021-06-24 DIAGNOSIS — R10.13 EPIGASTRIC ABDOMINAL PAIN: ICD-10-CM

## 2021-06-24 DIAGNOSIS — K21.9 HIATAL HERNIA WITH GASTROESOPHAGEAL REFLUX: ICD-10-CM

## 2021-06-24 PROCEDURE — 99214 OFFICE O/P EST MOD 30 MIN: CPT | Performed by: FAMILY MEDICINE

## 2021-06-24 PROCEDURE — 73522 X-RAY EXAM HIPS BI 3-4 VIEWS: CPT | Performed by: FAMILY MEDICINE

## 2021-06-24 RX ORDER — FAMOTIDINE 20 MG/1
20 TABLET ORAL 2 TIMES DAILY
COMMUNITY
Start: 2021-03-16 | End: 2021-12-21

## 2021-06-24 RX ORDER — CARVEDILOL 3.12 MG/1
3.12 TABLET ORAL DAILY
COMMUNITY
Start: 2021-06-01

## 2021-06-25 NOTE — PROGRESS NOTES
HPI/Subjective:   Jeannine Rogers is a 73year old female who presents for Pain (right hip, laying on it hurts, but if she sleeps on it it is numb in the morning.   x 4-5 mo ) and Abdominal Pain (udner ribs, when bending over and can last for days) Resp 18   Ht 5' 4\" (1.626 m)   Wt 205 lb (93 kg)   BMI 35.19 kg/m²  Estimated body mass index is 35.19 kg/m² as calculated from the following:    Height as of this encounter: 5' 4\" (1.626 m). Weight as of this encounter: 205 lb (93 kg).   General appea

## 2021-06-30 DIAGNOSIS — I10 UNCONTROLLED HYPERTENSION: ICD-10-CM

## 2021-06-30 RX ORDER — HYDROCHLOROTHIAZIDE 50 MG/1
TABLET ORAL
Qty: 90 TABLET | Refills: 1 | Status: SHIPPED | OUTPATIENT
Start: 2021-06-30 | End: 2021-12-20

## 2021-07-12 ENCOUNTER — PATIENT MESSAGE (OUTPATIENT)
Dept: FAMILY MEDICINE CLINIC | Facility: CLINIC | Age: 71
End: 2021-07-12

## 2021-07-12 DIAGNOSIS — M25.551 CHRONIC RIGHT HIP PAIN: Primary | ICD-10-CM

## 2021-07-12 DIAGNOSIS — G89.29 CHRONIC RIGHT HIP PAIN: Primary | ICD-10-CM

## 2021-07-12 NOTE — TELEPHONE ENCOUNTER
From: Ivan Valerio  To: Esteban Rodriguez MD  Sent: 7/12/2021 9:58 AM CDT  Subject: Visit Follow-up Question    You had wanted me to inform you of my health after I have finished with the steroid. I have been off the medication now for few weeks.  I

## 2021-07-13 ENCOUNTER — PATIENT MESSAGE (OUTPATIENT)
Dept: FAMILY MEDICINE CLINIC | Facility: CLINIC | Age: 71
End: 2021-07-13

## 2021-07-13 NOTE — TELEPHONE ENCOUNTER
From: Wyatt Valerio  To: Efra Marshall MD  Sent: 7/13/2021 7:13 AM CDT  Subject: Visit Follow-up Question    Please inform Dr Ross Prior that I work out at least 3 to 4 times a [facility] hour each day.  I think I will keep this rout

## 2021-07-15 DIAGNOSIS — I10 UNCONTROLLED HYPERTENSION: ICD-10-CM

## 2021-07-15 RX ORDER — LOSARTAN POTASSIUM 100 MG/1
TABLET ORAL
Qty: 90 TABLET | Refills: 0 | Status: SHIPPED | OUTPATIENT
Start: 2021-07-15 | End: 2021-10-12

## 2021-09-01 ENCOUNTER — LAB ENCOUNTER (OUTPATIENT)
Dept: LAB | Age: 71
End: 2021-09-01
Attending: FAMILY MEDICINE
Payer: MEDICARE

## 2021-09-01 DIAGNOSIS — E55.9 VITAMIN D DEFICIENCY: ICD-10-CM

## 2021-09-01 LAB — VIT D+METAB SERPL-MCNC: 32.9 NG/ML (ref 30–100)

## 2021-09-01 PROCEDURE — 82306 VITAMIN D 25 HYDROXY: CPT

## 2021-09-01 PROCEDURE — 36415 COLL VENOUS BLD VENIPUNCTURE: CPT

## 2021-09-10 DIAGNOSIS — E78.00 HYPERCHOLESTEREMIA: ICD-10-CM

## 2021-09-10 RX ORDER — ROSUVASTATIN CALCIUM 40 MG/1
TABLET, COATED ORAL
Qty: 90 TABLET | Refills: 0 | Status: SHIPPED | OUTPATIENT
Start: 2021-09-10 | End: 2021-12-13

## 2021-10-08 ENCOUNTER — LAB ENCOUNTER (OUTPATIENT)
Dept: LAB | Age: 71
End: 2021-10-08
Attending: INTERNAL MEDICINE
Payer: MEDICARE

## 2021-10-08 DIAGNOSIS — K44.9 HIATAL HERNIA: ICD-10-CM

## 2021-10-11 ENCOUNTER — HOSPITAL ENCOUNTER (OUTPATIENT)
Facility: HOSPITAL | Age: 71
Setting detail: HOSPITAL OUTPATIENT SURGERY
Discharge: HOME | End: 2021-10-11
Attending: INTERNAL MEDICINE | Admitting: INTERNAL MEDICINE
Payer: MEDICARE

## 2021-10-11 VITALS
DIASTOLIC BLOOD PRESSURE: 94 MMHG | HEART RATE: 59 BPM | WEIGHT: 199 LBS | HEIGHT: 64 IN | BODY MASS INDEX: 33.97 KG/M2 | OXYGEN SATURATION: 99 % | SYSTOLIC BLOOD PRESSURE: 166 MMHG | TEMPERATURE: 98 F | RESPIRATION RATE: 20 BRPM

## 2021-10-11 DIAGNOSIS — K22.70 BARRETT'S ESOPHAGUS WITHOUT DYSPLASIA: ICD-10-CM

## 2021-10-11 DIAGNOSIS — K44.9 HIATAL HERNIA: Primary | ICD-10-CM

## 2021-10-11 DIAGNOSIS — R19.8 ABNORMAL FINDINGS-GASTROINTESTINAL TRACT: ICD-10-CM

## 2021-10-11 PROCEDURE — 4A0B7BZ MEASUREMENT OF GASTROINTESTINAL PRESSURE, VIA NATURAL OR ARTIFICIAL OPENING: ICD-10-PCS | Performed by: INTERNAL MEDICINE

## 2021-10-11 RX ORDER — LIDOCAINE HYDROCHLORIDE 20 MG/ML
1 INJECTION, SOLUTION INFILTRATION; PERINEURAL ONCE
Status: COMPLETED | OUTPATIENT
Start: 2021-10-11 | End: 2021-10-11

## 2021-10-11 NOTE — PROGRESS NOTES
You will be happy to know that your COVID 19 test returned NEGATIVE. If you need any further assistance, please feel free to call [phone]. Thank you for letting us care for you.     Best regards,   Jax Vega MD  [address] [place] [address]

## 2021-10-12 DIAGNOSIS — I10 UNCONTROLLED HYPERTENSION: ICD-10-CM

## 2021-10-12 RX ORDER — LOSARTAN POTASSIUM 100 MG/1
TABLET ORAL
Qty: 90 TABLET | Refills: 0 | Status: ON HOLD | OUTPATIENT
Start: 2021-10-12 | End: 2021-12-29

## 2021-10-12 NOTE — TELEPHONE ENCOUNTER
LOV: 6/24/21 (acute) 2/1/21 (MAW)  Last Refill: 7/15/21, 90 days, 0 RF  Next OV: N/A    Protocol passed.

## 2021-10-18 ENCOUNTER — TELEPHONE (OUTPATIENT)
Dept: FAMILY MEDICINE CLINIC | Facility: CLINIC | Age: 71
End: 2021-10-18

## 2021-10-18 DIAGNOSIS — Z12.31 ENCOUNTER FOR SCREENING MAMMOGRAM FOR MALIGNANT NEOPLASM OF BREAST: Primary | ICD-10-CM

## 2021-11-11 ENCOUNTER — HOSPITAL ENCOUNTER (OUTPATIENT)
Dept: MAMMOGRAPHY | Age: 71
Discharge: HOME | End: 2021-11-11
Attending: FAMILY MEDICINE
Payer: MEDICARE

## 2021-11-11 DIAGNOSIS — Z12.31 ENCOUNTER FOR SCREENING MAMMOGRAM FOR MALIGNANT NEOPLASM OF BREAST: ICD-10-CM

## 2021-11-11 PROCEDURE — 77063 BREAST TOMOSYNTHESIS BI: CPT | Performed by: FAMILY MEDICINE

## 2021-11-11 PROCEDURE — 77067 SCR MAMMO BI INCL CAD: CPT | Performed by: FAMILY MEDICINE

## 2021-12-13 DIAGNOSIS — E78.00 HYPERCHOLESTEREMIA: ICD-10-CM

## 2021-12-13 RX ORDER — ROSUVASTATIN CALCIUM 40 MG/1
TABLET, COATED ORAL
Qty: 30 TABLET | Refills: 0 | Status: ON HOLD | OUTPATIENT
Start: 2021-12-13 | End: 2021-12-29

## 2021-12-13 RX ORDER — CIMETIDINE 300 MG/1
TABLET, FILM COATED ORAL
Qty: 60 TABLET | Refills: 0 | Status: SHIPPED | OUTPATIENT
Start: 2021-12-13 | End: 2022-01-10

## 2021-12-13 NOTE — TELEPHONE ENCOUNTER
LOV: 6/24/21  for: hiatal hernia with GERD  Patient advised to RTC on:  None stated  Previous Rx:  Cimetidine 300mgà Sig: Take 1 tablet by mouth twice daily. Disp #60/5 refills.   LF: 3/2/21    Previous Rx:  Rosuvastatin 40mgà Sig: Take 1 tablet by mouth ni

## 2021-12-19 DIAGNOSIS — I10 UNCONTROLLED HYPERTENSION: ICD-10-CM

## 2021-12-19 DIAGNOSIS — N95.1 FLUSHING, MENOPAUSAL: ICD-10-CM

## 2021-12-19 DIAGNOSIS — Z78.0 POSTMENOPAUSAL: ICD-10-CM

## 2021-12-20 RX ORDER — ESTRADIOL 1 MG/1
TABLET ORAL
Qty: 90 TABLET | Refills: 0 | Status: SHIPPED | OUTPATIENT
Start: 2021-12-20

## 2021-12-20 RX ORDER — HYDROCHLOROTHIAZIDE 50 MG/1
TABLET ORAL
Qty: 90 TABLET | Refills: 0 | Status: ON HOLD | OUTPATIENT
Start: 2021-12-20 | End: 2021-12-29

## 2021-12-21 RX ORDER — MELATONIN
2000 DAILY
COMMUNITY

## 2021-12-22 ENCOUNTER — EKG ENCOUNTER (OUTPATIENT)
Dept: LAB | Facility: HOSPITAL | Age: 71
End: 2021-12-22
Attending: SURGERY
Payer: MEDICARE

## 2021-12-22 ENCOUNTER — LABORATORY ENCOUNTER (OUTPATIENT)
Dept: LAB | Facility: HOSPITAL | Age: 71
End: 2021-12-22
Attending: SURGERY
Payer: MEDICARE

## 2021-12-22 DIAGNOSIS — K44.9 PARAESOPHAGEAL HERNIA: ICD-10-CM

## 2021-12-22 PROCEDURE — 36415 COLL VENOUS BLD VENIPUNCTURE: CPT

## 2021-12-22 PROCEDURE — 80048 BASIC METABOLIC PNL TOTAL CA: CPT

## 2021-12-22 PROCEDURE — 93010 ELECTROCARDIOGRAM REPORT: CPT | Performed by: INTERNAL MEDICINE

## 2021-12-22 PROCEDURE — 93005 ELECTROCARDIOGRAM TRACING: CPT

## 2021-12-27 ENCOUNTER — LAB ENCOUNTER (OUTPATIENT)
Dept: LAB | Facility: HOSPITAL | Age: 71
End: 2021-12-27
Attending: SURGERY
Payer: MEDICARE

## 2021-12-27 DIAGNOSIS — K44.9 PARAESOPHAGEAL HERNIA: ICD-10-CM

## 2021-12-29 ENCOUNTER — ANESTHESIA (OUTPATIENT)
Dept: SURGERY | Facility: HOSPITAL | Age: 71
End: 2021-12-29
Payer: MEDICARE

## 2021-12-29 ENCOUNTER — ANESTHESIA EVENT (OUTPATIENT)
Dept: SURGERY | Facility: HOSPITAL | Age: 71
End: 2021-12-29
Payer: MEDICARE

## 2021-12-29 ENCOUNTER — APPOINTMENT (OUTPATIENT)
Dept: GENERAL RADIOLOGY | Facility: HOSPITAL | Age: 71
End: 2021-12-29
Attending: ANESTHESIOLOGY
Payer: MEDICARE

## 2021-12-29 ENCOUNTER — APPOINTMENT (OUTPATIENT)
Dept: CV DIAGNOSTICS | Facility: HOSPITAL | Age: 71
End: 2021-12-29
Attending: NURSE PRACTITIONER
Payer: MEDICARE

## 2021-12-29 ENCOUNTER — HOSPITAL ENCOUNTER (OUTPATIENT)
Facility: HOSPITAL | Age: 71
Discharge: HOME | End: 2021-12-30
Attending: SURGERY | Admitting: SURGERY
Payer: MEDICARE

## 2021-12-29 DIAGNOSIS — K44.9 PARAESOPHAGEAL HERNIA: Primary | ICD-10-CM

## 2021-12-29 LAB
ATRIAL RATE: 46 BPM
P AXIS: 58 DEGREES
P-R INTERVAL: 192 MS
Q-T INTERVAL: 490 MS
QRS DURATION: 94 MS
QTC CALCULATION (BEZET): 428 MS
R AXIS: 17 DEGREES
T AXIS: 64 DEGREES
TROPONIN I HIGH SENSITIVITY: 5 NG/L
TROPONIN I HIGH SENSITIVITY: 5 NG/L
VENTRICULAR RATE: 46 BPM

## 2021-12-29 PROCEDURE — 0DV44ZZ RESTRICTION OF ESOPHAGOGASTRIC JUNCTION, PERCUTANEOUS ENDOSCOPIC APPROACH: ICD-10-PCS | Performed by: SURGERY

## 2021-12-29 PROCEDURE — 8E0W4CZ ROBOTIC ASSISTED PROCEDURE OF TRUNK REGION, PERCUTANEOUS ENDOSCOPIC APPROACH: ICD-10-PCS | Performed by: SURGERY

## 2021-12-29 PROCEDURE — 0BQT4ZZ REPAIR DIAPHRAGM, PERCUTANEOUS ENDOSCOPIC APPROACH: ICD-10-PCS | Performed by: SURGERY

## 2021-12-29 PROCEDURE — 93306 TTE W/DOPPLER COMPLETE: CPT | Performed by: NURSE PRACTITIONER

## 2021-12-29 PROCEDURE — 71045 X-RAY EXAM CHEST 1 VIEW: CPT | Performed by: ANESTHESIOLOGY

## 2021-12-29 PROCEDURE — 99214 OFFICE O/P EST MOD 30 MIN: CPT | Performed by: HOSPITALIST

## 2021-12-29 RX ORDER — ACETAMINOPHEN 325 MG/1
650 TABLET ORAL EVERY 6 HOURS PRN
Status: DISCONTINUED | OUTPATIENT
Start: 2021-12-29 | End: 2021-12-30

## 2021-12-29 RX ORDER — LIDOCAINE HYDROCHLORIDE 10 MG/ML
INJECTION, SOLUTION EPIDURAL; INFILTRATION; INTRACAUDAL; PERINEURAL AS NEEDED
Status: DISCONTINUED | OUTPATIENT
Start: 2021-12-29 | End: 2021-12-29 | Stop reason: SURG

## 2021-12-29 RX ORDER — ONDANSETRON 2 MG/ML
4 INJECTION INTRAMUSCULAR; INTRAVENOUS EVERY 6 HOURS PRN
Status: DISCONTINUED | OUTPATIENT
Start: 2021-12-29 | End: 2021-12-30

## 2021-12-29 RX ORDER — ROCURONIUM BROMIDE 10 MG/ML
INJECTION, SOLUTION INTRAVENOUS AS NEEDED
Status: DISCONTINUED | OUTPATIENT
Start: 2021-12-29 | End: 2021-12-29 | Stop reason: SURG

## 2021-12-29 RX ORDER — DEXTROSE, SODIUM CHLORIDE, AND POTASSIUM CHLORIDE 5; .45; .15 G/100ML; G/100ML; G/100ML
INJECTION INTRAVENOUS CONTINUOUS
Status: DISCONTINUED | OUTPATIENT
Start: 2021-12-29 | End: 2021-12-30

## 2021-12-29 RX ORDER — LOSARTAN POTASSIUM 100 MG/1
100 TABLET ORAL DAILY
COMMUNITY

## 2021-12-29 RX ORDER — DEXAMETHASONE SODIUM PHOSPHATE 4 MG/ML
VIAL (ML) INJECTION AS NEEDED
Status: DISCONTINUED | OUTPATIENT
Start: 2021-12-29 | End: 2021-12-29 | Stop reason: SURG

## 2021-12-29 RX ORDER — MIDAZOLAM HYDROCHLORIDE 1 MG/ML
1 INJECTION INTRAMUSCULAR; INTRAVENOUS EVERY 5 MIN PRN
Status: DISCONTINUED | OUTPATIENT
Start: 2021-12-29 | End: 2021-12-29 | Stop reason: HOSPADM

## 2021-12-29 RX ORDER — SODIUM CHLORIDE, SODIUM LACTATE, POTASSIUM CHLORIDE, CALCIUM CHLORIDE 600; 310; 30; 20 MG/100ML; MG/100ML; MG/100ML; MG/100ML
INJECTION, SOLUTION INTRAVENOUS CONTINUOUS
Status: DISCONTINUED | OUTPATIENT
Start: 2021-12-29 | End: 2021-12-30

## 2021-12-29 RX ORDER — ZOLPIDEM TARTRATE 5 MG/1
5 TABLET ORAL NIGHTLY PRN
Status: DISCONTINUED | OUTPATIENT
Start: 2021-12-29 | End: 2021-12-30

## 2021-12-29 RX ORDER — SENNOSIDES 8.6 MG
17.2 TABLET ORAL NIGHTLY PRN
Status: DISCONTINUED | OUTPATIENT
Start: 2021-12-29 | End: 2021-12-30

## 2021-12-29 RX ORDER — KETOROLAC TROMETHAMINE 30 MG/ML
30 INJECTION, SOLUTION INTRAMUSCULAR; INTRAVENOUS EVERY 6 HOURS PRN
Status: DISCONTINUED | OUTPATIENT
Start: 2021-12-29 | End: 2021-12-30

## 2021-12-29 RX ORDER — DIPHENHYDRAMINE HYDROCHLORIDE 50 MG/ML
12.5 INJECTION INTRAMUSCULAR; INTRAVENOUS AS NEEDED
Status: DISCONTINUED | OUTPATIENT
Start: 2021-12-29 | End: 2021-12-29 | Stop reason: HOSPADM

## 2021-12-29 RX ORDER — SODIUM CHLORIDE 9 MG/ML
INJECTION, SOLUTION INTRAVENOUS CONTINUOUS
Status: DISCONTINUED | OUTPATIENT
Start: 2021-12-29 | End: 2021-12-29 | Stop reason: HOSPADM

## 2021-12-29 RX ORDER — PANTOPRAZOLE SODIUM 40 MG/1
40 TABLET, DELAYED RELEASE ORAL
Status: DISCONTINUED | OUTPATIENT
Start: 2021-12-30 | End: 2021-12-30

## 2021-12-29 RX ORDER — SODIUM CHLORIDE, SODIUM LACTATE, POTASSIUM CHLORIDE, CALCIUM CHLORIDE 600; 310; 30; 20 MG/100ML; MG/100ML; MG/100ML; MG/100ML
INJECTION, SOLUTION INTRAVENOUS CONTINUOUS
Status: DISCONTINUED | OUTPATIENT
Start: 2021-12-29 | End: 2021-12-29 | Stop reason: HOSPADM

## 2021-12-29 RX ORDER — BUPIVACAINE HYDROCHLORIDE 5 MG/ML
INJECTION, SOLUTION EPIDURAL; INTRACAUDAL AS NEEDED
Status: DISCONTINUED | OUTPATIENT
Start: 2021-12-29 | End: 2021-12-29 | Stop reason: HOSPADM

## 2021-12-29 RX ORDER — ACETAMINOPHEN 500 MG
500 TABLET ORAL EVERY 6 HOURS PRN
COMMUNITY
End: 2022-01-27 | Stop reason: ALTCHOICE

## 2021-12-29 RX ORDER — BISACODYL 10 MG
10 SUPPOSITORY, RECTAL RECTAL
Status: DISCONTINUED | OUTPATIENT
Start: 2021-12-29 | End: 2021-12-30

## 2021-12-29 RX ORDER — CEFAZOLIN SODIUM/WATER 2 G/20 ML
SYRINGE (ML) INTRAVENOUS
Status: DISPENSED
Start: 2021-12-29 | End: 2021-12-29

## 2021-12-29 RX ORDER — CEFAZOLIN SODIUM/WATER 2 G/20 ML
2 SYRINGE (ML) INTRAVENOUS ONCE
Status: COMPLETED | OUTPATIENT
Start: 2021-12-29 | End: 2021-12-29

## 2021-12-29 RX ORDER — ROSUVASTATIN CALCIUM 40 MG/1
40 TABLET, COATED ORAL NIGHTLY
COMMUNITY
End: 2022-01-10

## 2021-12-29 RX ORDER — HYDROCHLOROTHIAZIDE 50 MG/1
50 TABLET ORAL DAILY
COMMUNITY

## 2021-12-29 RX ORDER — NEOSTIGMINE METHYLSULFATE 1 MG/ML
INJECTION INTRAVENOUS AS NEEDED
Status: DISCONTINUED | OUTPATIENT
Start: 2021-12-29 | End: 2021-12-29 | Stop reason: SURG

## 2021-12-29 RX ORDER — ONDANSETRON 2 MG/ML
INJECTION INTRAMUSCULAR; INTRAVENOUS AS NEEDED
Status: DISCONTINUED | OUTPATIENT
Start: 2021-12-29 | End: 2021-12-29 | Stop reason: SURG

## 2021-12-29 RX ORDER — HYDROMORPHONE HYDROCHLORIDE 1 MG/ML
INJECTION, SOLUTION INTRAMUSCULAR; INTRAVENOUS; SUBCUTANEOUS
Status: COMPLETED
Start: 2021-12-29 | End: 2021-12-29

## 2021-12-29 RX ORDER — DEXAMETHASONE SODIUM PHOSPHATE 4 MG/ML
4 VIAL (ML) INJECTION AS NEEDED
Status: DISCONTINUED | OUTPATIENT
Start: 2021-12-29 | End: 2021-12-29 | Stop reason: HOSPADM

## 2021-12-29 RX ORDER — HYDROMORPHONE HYDROCHLORIDE 1 MG/ML
0.4 INJECTION, SOLUTION INTRAMUSCULAR; INTRAVENOUS; SUBCUTANEOUS EVERY 5 MIN PRN
Status: DISCONTINUED | OUTPATIENT
Start: 2021-12-29 | End: 2021-12-29 | Stop reason: HOSPADM

## 2021-12-29 RX ORDER — METOPROLOL TARTRATE 5 MG/5ML
5 INJECTION INTRAVENOUS EVERY 6 HOURS PRN
Status: DISCONTINUED | OUTPATIENT
Start: 2021-12-29 | End: 2021-12-30

## 2021-12-29 RX ORDER — MEPERIDINE HYDROCHLORIDE 25 MG/ML
12.5 INJECTION INTRAMUSCULAR; INTRAVENOUS; SUBCUTANEOUS AS NEEDED
Status: DISCONTINUED | OUTPATIENT
Start: 2021-12-29 | End: 2021-12-29 | Stop reason: HOSPADM

## 2021-12-29 RX ORDER — CARVEDILOL 3.12 MG/1
3.12 TABLET ORAL DAILY
Status: DISCONTINUED | OUTPATIENT
Start: 2021-12-29 | End: 2021-12-30

## 2021-12-29 RX ORDER — LOSARTAN POTASSIUM 100 MG/1
100 TABLET ORAL DAILY
Status: DISCONTINUED | OUTPATIENT
Start: 2021-12-30 | End: 2021-12-30

## 2021-12-29 RX ORDER — HYDROCODONE BITARTRATE AND ACETAMINOPHEN 5; 325 MG/1; MG/1
1 TABLET ORAL AS NEEDED
Status: DISCONTINUED | OUTPATIENT
Start: 2021-12-29 | End: 2021-12-29 | Stop reason: HOSPADM

## 2021-12-29 RX ORDER — NALOXONE HYDROCHLORIDE 0.4 MG/ML
80 INJECTION, SOLUTION INTRAMUSCULAR; INTRAVENOUS; SUBCUTANEOUS AS NEEDED
Status: DISCONTINUED | OUTPATIENT
Start: 2021-12-29 | End: 2021-12-29 | Stop reason: HOSPADM

## 2021-12-29 RX ORDER — LIDOCAINE HYDROCHLORIDE 10 MG/ML
INJECTION, SOLUTION INFILTRATION; PERINEURAL AS NEEDED
Status: DISCONTINUED | OUTPATIENT
Start: 2021-12-29 | End: 2021-12-29 | Stop reason: HOSPADM

## 2021-12-29 RX ORDER — HYDROCODONE BITARTRATE AND ACETAMINOPHEN 5; 325 MG/1; MG/1
2 TABLET ORAL AS NEEDED
Status: DISCONTINUED | OUTPATIENT
Start: 2021-12-29 | End: 2021-12-29 | Stop reason: HOSPADM

## 2021-12-29 RX ORDER — GLYCOPYRROLATE 0.2 MG/ML
INJECTION, SOLUTION INTRAMUSCULAR; INTRAVENOUS AS NEEDED
Status: DISCONTINUED | OUTPATIENT
Start: 2021-12-29 | End: 2021-12-29 | Stop reason: SURG

## 2021-12-29 RX ORDER — SODIUM PHOSPHATE, DIBASIC AND SODIUM PHOSPHATE, MONOBASIC 7; 19 G/133ML; G/133ML
1 ENEMA RECTAL ONCE AS NEEDED
Status: DISCONTINUED | OUTPATIENT
Start: 2021-12-29 | End: 2021-12-30

## 2021-12-29 RX ORDER — POLYETHYLENE GLYCOL 3350 17 G/17G
17 POWDER, FOR SOLUTION ORAL DAILY PRN
Status: DISCONTINUED | OUTPATIENT
Start: 2021-12-29 | End: 2021-12-30

## 2021-12-29 RX ORDER — ACETAMINOPHEN 500 MG
1000 TABLET ORAL ONCE
Status: DISCONTINUED | OUTPATIENT
Start: 2021-12-29 | End: 2021-12-29

## 2021-12-29 RX ORDER — METOCLOPRAMIDE HYDROCHLORIDE 5 MG/ML
INJECTION INTRAMUSCULAR; INTRAVENOUS AS NEEDED
Status: DISCONTINUED | OUTPATIENT
Start: 2021-12-29 | End: 2021-12-29 | Stop reason: SURG

## 2021-12-29 RX ORDER — METOCLOPRAMIDE HYDROCHLORIDE 5 MG/ML
10 INJECTION INTRAMUSCULAR; INTRAVENOUS AS NEEDED
Status: DISCONTINUED | OUTPATIENT
Start: 2021-12-29 | End: 2021-12-29 | Stop reason: HOSPADM

## 2021-12-29 RX ORDER — ONDANSETRON 2 MG/ML
4 INJECTION INTRAMUSCULAR; INTRAVENOUS AS NEEDED
Status: DISCONTINUED | OUTPATIENT
Start: 2021-12-29 | End: 2021-12-29 | Stop reason: HOSPADM

## 2021-12-29 RX ADMIN — CEFAZOLIN SODIUM/WATER 2 G: 2 G/20 ML SYRINGE (ML) INTRAVENOUS at 07:54:00

## 2021-12-29 RX ADMIN — METOCLOPRAMIDE HYDROCHLORIDE 10 MG: 5 INJECTION INTRAMUSCULAR; INTRAVENOUS at 07:39:00

## 2021-12-29 RX ADMIN — NEOSTIGMINE METHYLSULFATE 3 MG: 1 INJECTION INTRAVENOUS at 11:08:00

## 2021-12-29 RX ADMIN — ROCURONIUM BROMIDE 50 MG: 10 INJECTION, SOLUTION INTRAVENOUS at 07:39:00

## 2021-12-29 RX ADMIN — ONDANSETRON 4 MG: 2 INJECTION INTRAMUSCULAR; INTRAVENOUS at 07:39:00

## 2021-12-29 RX ADMIN — GLYCOPYRROLATE 0.6 MG: 0.2 INJECTION, SOLUTION INTRAMUSCULAR; INTRAVENOUS at 11:08:00

## 2021-12-29 RX ADMIN — DEXAMETHASONE SODIUM PHOSPHATE 4 MG: 4 MG/ML VIAL (ML) INJECTION at 07:39:00

## 2021-12-29 RX ADMIN — LIDOCAINE HYDROCHLORIDE 25 MG: 10 INJECTION, SOLUTION EPIDURAL; INFILTRATION; INTRACAUDAL; PERINEURAL at 07:39:00

## 2021-12-29 RX ADMIN — GLYCOPYRROLATE 0.2 MG: 0.2 INJECTION, SOLUTION INTRAMUSCULAR; INTRAVENOUS at 08:14:00

## 2021-12-29 NOTE — ANESTHESIA PREPROCEDURE EVALUATION
PRE-OP EVALUATION    Patient Name: T[facility] [place]    Admit Diagnosis: Paraesophageal hernia [K44.9]    Pre-op Diagnosis: Paraesophageal hernia [K44.9]    XI ROBOT-ASSISTED PARAESOPHAGEAL HERNIA REPAIR WITH TOUPET FUNDOPLICATION    Anesthesia Procedure time        Allergies: Adhesive Tape, Aspirin, Iodinated Diagnostic Agents [Radiology Contrast Iodinated Dyes], Atorvastatin, Amlodipine, Shellfish-Derived Products, and Sulfa Antibiotics      Anesthesia Evaluation    Patient summary reviewed.     Anestheti ASA: 2   Plan: general  NPO status verified and     Post-procedure pain management plan discussed with surgeon and patient.     Comment: Discussed risks and benefits of general anesthesia including malignant hyperthermia as well as injury to eyes and

## 2021-12-29 NOTE — OPERATIVE REPORT
[facility]                                                         Operative Note    Tanya Valerio Location: [facility]  Perioperative   CSN [id number] MRN [id number]   Admission Date 12/29/2021 Procedure Date 12/29/2021   Attending Physician Bryant Chavira the thickened hernia sac which surrounded the proximal stomach and distal esophagus. This dissection was carried laterally to expose the right abdominal wall vasquez which was cleared down to its apex.   Gastrohepatic ligament was divided with the vessel seal suture. Sterile dressings were applied she was awoken taken recovery in satisfactory condition.         Gaye Lynn MD  12/29/2021  11:47 AM

## 2021-12-29 NOTE — ANESTHESIA PROCEDURE NOTES
Airway  Date/Time: 12/29/2021 7:40 AM  Urgency: elective      General Information and Staff    Patient location during procedure: OR  Anesthesiologist: Tino Jansen MD  Performed: anesthesiologist     Indications and Patient Condition  Indications for air

## 2021-12-29 NOTE — H&P
H&P  Suzanne Barry MD (Physician) • • SURGERY, GENERAL  Reesa Ryan is a 73year old female.  Patient presents with:  Consult: Ref by Dr. Kris Young; Hiatal hernia         Rula Santamaria     HPI:  Patient presents with a longstanding history of • Breast Cancer Maternal Aunt 76      Social History    Tobacco Use      Smoking status: Never Smoker      Smokeless tobacco: Never Used    Vaping Use      Vaping Use: Never used    Alcohol use: Yes      Comment: 4 drinks a week     Drug use:  No        A normocephalic; normal nose, pharynx and TM's  NECK: supple; FROM; no JVD, no TMG, no carotid bruits  BREASTS: deferred  RESPIRATORY: clear to percussion and auscultation  CARDIOVASCULAR: S1, S2 normal, RRR; no S3, no S4; no click; murmur negative  ABDOMEN: AM    Encounter Reviewed By    Barnet Landau, MD [address] [place] [address] 7/1/2021 12:20 PM       Additional Encounter Details    Scanning Cover Sheet   All Scans   Questionnaire Details   Referral Information   [facility] Time Out   Administrative Information   Coding Query   C • SINUS SURGERY           rhinoplasty, and turbinate   • SLING OPER STRES INCONTINENCE   03/01/2021     mid urethral sling (Obtyrx) with bladder neck biopsy            Family History   Problem Relation Age of Onset   • Heart Disorder Father           MI tablet 1   • Calcium Carbonate-Vitamin D (CALCIUM 600 + D OR) Take 1,000 mg by mouth daily.         • NON FORMULARY            ROS:  A comprehensive 14 point review of systems was completed.   Pertinent positives and negatives noted in the the HPI.        E MD    The above referenced H&P was reviewed by Vallorie Holter, MD on 12/29/2021, the patient was examined and no significant changes have occurred in the patient's condition since the H&P was performed.   Risks and benefits were discussed, proceed with proce

## 2021-12-29 NOTE — BRIEF OP NOTE
Pre-Operative Diagnosis: Paraesophageal hernia [K44.9]     Post-Operative Diagnosis: Paraesophageal hernia [K44.9]      Procedure Performed:   XI ROBOT-ASSISTED PARAESOPHAGEAL HERNIA REPAIR WITH TOUPET FUNDOPLICATION    Surgeon(s) and Role:     Jose Thompson Gr

## 2021-12-29 NOTE — CONSULTS
Jeannette  Consultation    [facility] Patient Status:  Outpatient in a Bed    1950 MRN [id number]   Location [facility] Attending Nuzhat Post MD   Hosp Day # 0 PCP Farheen Tran MD       Reason Surgical History:   Procedure Laterality Date   • COLONOSCOPY N/A 12/11/2017    Procedure: COLONOSCOPY;  Surgeon: Citlali Ponce MD;  Location: [address] [place] [address] ENDOSCOPY   • HYSTERECTOMY  1982    total hysterectomy    • LEXIE LOCALIZATION WIRE 1 SITE RIGHT (CPT=19281)

## 2021-12-29 NOTE — ANESTHESIA POSTPROCEDURE EVALUATION
[address] [facility] Patient Status:  Outpatient in a Bed   Age/Gender 73year old female MRN [id number]   Location [facility] Attending Tom Prater MD   Hosp Day # 0 PCP MD Radha Lewis

## 2021-12-29 NOTE — PROGRESS NOTES
NURSING ADMISSION NOTE      Patient admitted via bed. Oriented to room. Safety precautions initiated. Bed in low position. Call light in reach. Patient reports pain is 2/10, declines pain meds at this time.  Lap sites x 6 to abdomen with skin glu

## 2021-12-29 NOTE — PLAN OF CARE
Problem: Patient/Family Goals  Goal: Patient/Family Long Term Goal  Description: Patient's Long Term Goal: Discharge    Interventions:  - IVF  -Ambulate TID  -NPO  - See additional Care Plan goals for specific interventions  Outcome: Progressing  Goal: P resources  Description: INTERVENTIONS:  - Identify barriers to discharge w/pt and caregiver  - Include patient/family/discharge partner in discharge planning  - Arrange for needed discharge resources and transportation as appropriate  - Identify discharge as needed  - Follow urinary retention protocol/standard of care  - Consider collaborating with pharmacy to review patient's medication profile  - Implement strategies to promote bladder emptying  Outcome: Progressing

## 2021-12-29 NOTE — CONSULTS
[facility]  CONSULT     [address] Patient Status:  Outpatient in a Bed    1950 MRN [id number]   [facility] 3NW-A Attending Dre Haywood MD   Hosp Day # 0 PCP Merline Cardenas MD     Reason for consult: Medical M Brain and lung cancer   • Heart Disorder Maternal Grandmother    • Heart Disorder Maternal Grandfather    • Heart Disorder Paternal Grandfather         MI   • Heart Disorder Brother    • Breast Cancer Maternal Aunt 76      Allergies:   Adhesive Tape bruits. Respiratory: Clear to auscultation bilaterally. No wheezes. No rhonchi. Cardiovascular: S1, S2. Bradycardic. No murmurs, rubs or gallops. Equal pulses. Chest and Back: No tenderness or deformity.   Abdomen: Soft, surgical site tenderness, nondis DO  12/29/2021

## 2021-12-30 VITALS
WEIGHT: 200.63 LBS | BODY MASS INDEX: 33.83 KG/M2 | HEIGHT: 64.5 IN | OXYGEN SATURATION: 95 % | HEART RATE: 58 BPM | TEMPERATURE: 98 F | DIASTOLIC BLOOD PRESSURE: 85 MMHG | RESPIRATION RATE: 18 BRPM | SYSTOLIC BLOOD PRESSURE: 155 MMHG

## 2021-12-30 PROCEDURE — 99213 OFFICE O/P EST LOW 20 MIN: CPT | Performed by: INTERNAL MEDICINE

## 2021-12-30 RX ORDER — HYDRALAZINE HYDROCHLORIDE 25 MG/1
25 TABLET, FILM COATED ORAL EVERY 8 HOURS SCHEDULED
Qty: 30 TABLET | Refills: 3 | Status: SHIPPED | OUTPATIENT
Start: 2021-12-30

## 2021-12-30 RX ORDER — HYDRALAZINE HYDROCHLORIDE 25 MG/1
25 TABLET, FILM COATED ORAL EVERY 8 HOURS SCHEDULED
Status: DISCONTINUED | OUTPATIENT
Start: 2021-12-30 | End: 2021-12-30

## 2021-12-30 RX ORDER — TRAMADOL HYDROCHLORIDE 50 MG/1
50 TABLET ORAL EVERY 6 HOURS PRN
Qty: 20 TABLET | Refills: 0 | Status: SHIPPED | OUTPATIENT
Start: 2021-12-30 | End: 2022-01-09

## 2021-12-30 RX ORDER — ROSUVASTATIN CALCIUM 20 MG/1
40 TABLET, COATED ORAL NIGHTLY
Status: DISCONTINUED | OUTPATIENT
Start: 2021-12-30 | End: 2021-12-30

## 2021-12-30 NOTE — PLAN OF CARE
PT axox4, resting in bed, stuart draining clear yellow urine, has own cpap tubing, tele NSR, abdomen soft rounded, non-tender, six lap sites CHANTAL dermabond, PIV infusing DR Yamila45 with 20KCL infusing @ 100ml/hr, clear liquid diet, has pain in between shoulder b assistance with activity based on assessment  - Modify environment to reduce risk of injury  - Provide assistive devices as appropriate  - Consider OT/PT consult to assist with strengthening/mobility  - Encourage toileting schedule  Outcome: Progressing Establish a toileting routine/schedule  - Consider collaborating with pharmacy to review patient's medication profile  Outcome: Progressing     Problem: GENITOURINARY - ADULT  Goal: Absence of urinary retention  Description: INTERVENTIONS:  - Assess patimaria luisa

## 2021-12-30 NOTE — PROGRESS NOTES
Progress Note  Nancy Valerio Patient Status:  Outpatient in a Bed    1950 MRN [id number]   [facility] 3NW-A Attending Marina Collier MD   Hosp Day # 0 PCP Umberto Jim MD     Subjective:  Pt sitting up in chair.  Hypertens ischemic changes. Troponin negative x 2  · HTN  · HLD  · Fibromuscular dysplasia    Plan:  · Currently chest pain free. Cardiac work up negative. Outpatient follow up including stress test    · Echo 12/29/2021:      1. Left ventricle:  The cavity size was n

## 2021-12-30 NOTE — PLAN OF CARE
Patient is alert and oriented x3  Up ad dante  Voiding  Tolerating FLD; dietician at bedside with DC diet education  Incisions are CDI    Patient assessed and ready for DC home  All instructions given to patient for home  All questions answered to patients l assessment.  - Educate pt/family on patient safety including physical limitations  - Instruct pt to call for assistance with activity based on assessment  - Modify environment to reduce risk of injury  - Provide assistive devices as appropriate  - Consider

## 2021-12-30 NOTE — PROGRESS NOTES
[facility]  Progress Note    Jessica Valerio Patient Status:  Outpatient in a Bed    1950 MRN [id number]   [facility] 3NW-A Attending Aisha Block MD   Hosp Day # 0 PCP Larissa Roberts MD     Subjective:  Feeling well.   Ivan Espinoza

## 2021-12-30 NOTE — PROGRESS NOTES
[facility]     Hospitalist Progress Note     Jamison Valerio Patient Status:  Outpatient in a Bed    1950 MRN [id number]   [facility] 3NW-A Attending Daja Marcum MD   Hosp Day # 0 PCP Nilda Ivory MD     Chief Complai Epic.    Medications:   • hydrALAZINE  25 mg Oral Q8H [facility]   • rosuvastatin  40 mg Oral Nightly   • carvedilol  3.125 mg Oral Daily   • losartan  100 mg Oral Daily   • pantoprazole  40 mg Oral QAM AC       Assessment & Plan:      #Paraesphageal hernia s/p r

## 2021-12-30 NOTE — DIETARY NOTE
[address] [facility]     Admitting diagnosis:  Paraesophageal hernia [K44.9]    Ht: 163.8 cm (5' 4.5\")  Wt: 91 kg (200 lb 9.9 oz). Body mass index is 33.9 kg/m².   Ideal body weight: 55.8 kg (123 lb 2 oz)  Adjusted

## 2022-01-10 RX ORDER — CIMETIDINE 300 MG/1
TABLET, FILM COATED ORAL
Qty: 60 TABLET | Refills: 0 | Status: SHIPPED | OUTPATIENT
Start: 2022-01-10

## 2022-01-10 RX ORDER — ROSUVASTATIN CALCIUM 40 MG/1
TABLET, COATED ORAL
Qty: 30 TABLET | Refills: 0 | Status: SHIPPED | OUTPATIENT
Start: 2022-01-10 | End: 2022-01-27

## 2022-01-14 ENCOUNTER — MED REC SCAN ONLY (OUTPATIENT)
Dept: FAMILY MEDICINE CLINIC | Facility: CLINIC | Age: 72
End: 2022-01-14

## 2022-01-27 ENCOUNTER — OFFICE VISIT (OUTPATIENT)
Dept: FAMILY MEDICINE CLINIC | Facility: CLINIC | Age: 72
End: 2022-01-27
Payer: MEDICARE

## 2022-01-27 ENCOUNTER — LAB ENCOUNTER (OUTPATIENT)
Dept: LAB | Age: 72
End: 2022-01-27
Attending: FAMILY MEDICINE
Payer: MEDICARE

## 2022-01-27 VITALS
WEIGHT: 194 LBS | TEMPERATURE: 98 F | BODY MASS INDEX: 33.12 KG/M2 | RESPIRATION RATE: 18 BRPM | DIASTOLIC BLOOD PRESSURE: 80 MMHG | HEIGHT: 64 IN | HEART RATE: 60 BPM | SYSTOLIC BLOOD PRESSURE: 134 MMHG

## 2022-01-27 DIAGNOSIS — Z13.21 ENCOUNTER FOR VITAMIN DEFICIENCY SCREENING: ICD-10-CM

## 2022-01-27 DIAGNOSIS — E78.00 HYPERCHOLESTEREMIA: ICD-10-CM

## 2022-01-27 DIAGNOSIS — R73.9 ELEVATED BLOOD SUGAR: ICD-10-CM

## 2022-01-27 DIAGNOSIS — Z98.890 STATUS POST ROBOT-ASSISTED SURGICAL PROCEDURE: ICD-10-CM

## 2022-01-27 DIAGNOSIS — I10 BENIGN ESSENTIAL HTN: ICD-10-CM

## 2022-01-27 DIAGNOSIS — E83.52 HYPERCALCEMIA: ICD-10-CM

## 2022-01-27 DIAGNOSIS — I10 BENIGN ESSENTIAL HTN: Primary | ICD-10-CM

## 2022-01-27 LAB
ALBUMIN SERPL-MCNC: 4 G/DL (ref 3.4–5)
ALBUMIN/GLOB SERPL: 1.1 {RATIO} (ref 1–2)
ALP LIVER SERPL-CCNC: 91 U/L
ALT SERPL-CCNC: 32 U/L
ANION GAP SERPL CALC-SCNC: 8 MMOL/L (ref 0–18)
AST SERPL-CCNC: 33 U/L (ref 15–37)
BASOPHILS # BLD AUTO: 0.05 X10(3) UL (ref 0–0.2)
BASOPHILS NFR BLD AUTO: 0.7 %
BILIRUB SERPL-MCNC: 0.5 MG/DL (ref 0.1–2)
BUN BLD-MCNC: 18 MG/DL (ref 7–18)
CALCIUM BLD-MCNC: 10.2 MG/DL (ref 8.5–10.1)
CHLORIDE SERPL-SCNC: 102 MMOL/L (ref 98–112)
CHOLEST SERPL-MCNC: 160 MG/DL (ref ?–200)
CO2 SERPL-SCNC: 30 MMOL/L (ref 21–32)
CREAT BLD-MCNC: 1.07 MG/DL
EOSINOPHIL # BLD AUTO: 0.53 X10(3) UL (ref 0–0.7)
EOSINOPHIL NFR BLD AUTO: 7.6 %
ERYTHROCYTE [DISTWIDTH] IN BLOOD BY AUTOMATED COUNT: 13.7 %
EST. AVERAGE GLUCOSE BLD GHB EST-MCNC: 128 MG/DL (ref 68–126)
FASTING PATIENT LIPID ANSWER: YES
FASTING STATUS PATIENT QL REPORTED: YES
GLOBULIN PLAS-MCNC: 3.8 G/DL (ref 2.8–4.4)
GLUCOSE BLD-MCNC: 94 MG/DL (ref 70–99)
HBA1C MFR BLD: 6.1 % (ref ?–5.7)
HCT VFR BLD AUTO: 40.1 %
HDLC SERPL-MCNC: 69 MG/DL (ref 40–59)
HGB BLD-MCNC: 13 G/DL
IMM GRANULOCYTES # BLD AUTO: 0.02 X10(3) UL (ref 0–1)
IMM GRANULOCYTES NFR BLD: 0.3 %
LDLC SERPL CALC-MCNC: 68 MG/DL (ref ?–100)
LYMPHOCYTES # BLD AUTO: 1.79 X10(3) UL (ref 1–4)
LYMPHOCYTES NFR BLD AUTO: 25.6 %
MCH RBC QN AUTO: 28.7 PG (ref 26–34)
MCHC RBC AUTO-ENTMCNC: 32.4 G/DL (ref 31–37)
MCV RBC AUTO: 88.5 FL
MONOCYTES # BLD AUTO: 0.58 X10(3) UL (ref 0.1–1)
MONOCYTES NFR BLD AUTO: 8.3 %
NEUTROPHILS # BLD AUTO: 4.03 X10 (3) UL (ref 1.5–7.7)
NEUTROPHILS # BLD AUTO: 4.03 X10(3) UL (ref 1.5–7.7)
NEUTROPHILS NFR BLD AUTO: 57.5 %
NONHDLC SERPL-MCNC: 91 MG/DL (ref ?–130)
OSMOLALITY SERPL CALC.SUM OF ELEC: 292 MOSM/KG (ref 275–295)
PLATELET # BLD AUTO: 264 10(3)UL (ref 150–450)
POTASSIUM SERPL-SCNC: 3.9 MMOL/L (ref 3.5–5.1)
PROT SERPL-MCNC: 7.8 G/DL (ref 6.4–8.2)
RBC # BLD AUTO: 4.53 X10(6)UL
SODIUM SERPL-SCNC: 140 MMOL/L (ref 136–145)
TRIGL SERPL-MCNC: 132 MG/DL (ref 30–149)
VIT D+METAB SERPL-MCNC: 40.8 NG/ML (ref 30–100)
VLDLC SERPL CALC-MCNC: 20 MG/DL (ref 0–30)
WBC # BLD AUTO: 7 X10(3) UL (ref 4–11)

## 2022-01-27 PROCEDURE — 80053 COMPREHEN METABOLIC PANEL: CPT

## 2022-01-27 PROCEDURE — 1111F DSCHRG MED/CURRENT MED MERGE: CPT | Performed by: FAMILY MEDICINE

## 2022-01-27 PROCEDURE — 83036 HEMOGLOBIN GLYCOSYLATED A1C: CPT

## 2022-01-27 PROCEDURE — 85025 COMPLETE CBC W/AUTO DIFF WBC: CPT

## 2022-01-27 PROCEDURE — 3079F DIAST BP 80-89 MM HG: CPT | Performed by: FAMILY MEDICINE

## 2022-01-27 PROCEDURE — 3075F SYST BP GE 130 - 139MM HG: CPT | Performed by: FAMILY MEDICINE

## 2022-01-27 PROCEDURE — 82306 VITAMIN D 25 HYDROXY: CPT

## 2022-01-27 PROCEDURE — 3008F BODY MASS INDEX DOCD: CPT | Performed by: FAMILY MEDICINE

## 2022-01-27 PROCEDURE — 83970 ASSAY OF PARATHORMONE: CPT

## 2022-01-27 PROCEDURE — 99214 OFFICE O/P EST MOD 30 MIN: CPT | Performed by: FAMILY MEDICINE

## 2022-01-27 PROCEDURE — 80061 LIPID PANEL: CPT

## 2022-01-27 RX ORDER — ROSUVASTATIN CALCIUM 40 MG/1
40 TABLET, COATED ORAL NIGHTLY
Qty: 30 TABLET | Refills: 0 | Status: SHIPPED | OUTPATIENT
Start: 2022-01-27

## 2022-01-27 NOTE — PROGRESS NOTES
Subjective:   Radha Caruso is a 73year old female who presents for Hypertension and Hypercholesterolemia     Patient states that overall she is feeling well. She is also known to have hypertension and hypercholesterolemia.   She has seen Dr. Hardik George Estimated body mass index is 33.3 kg/m² as calculated from the following:    Height as of this encounter: 5' 4\" (1.626 m). Weight as of this encounter: 194 lb (88 kg).   General appearance: alert, appears stated age and cooperative  Head: Normocephalic,

## 2022-01-28 LAB — PTH-INTACT SERPL-MCNC: 23.9 PG/ML (ref 18.5–88)

## 2022-01-31 ENCOUNTER — PATIENT MESSAGE (OUTPATIENT)
Dept: FAMILY MEDICINE CLINIC | Facility: CLINIC | Age: 72
End: 2022-01-31

## 2022-01-31 NOTE — TELEPHONE ENCOUNTER
Boris Ramires, [place] 1/31/2022 5:04 PM CST      ----- Message -----  From: Genoveva Valerio  Sent: 1/31/2022 12:33 PM CST  To: [facility] Clinical Staff  Subject: Lipid results     I saw the results of the Lipid panel. If Dr Marisa Washington plans to continue me on the rosuvastatin 40 mg , I was wondering if he would consider reducing the dosage from 40 mg to 20 mg. If not than my new insurance plan will need a authorization from you. Here is the number .  Thank You

## 2022-01-31 NOTE — TELEPHONE ENCOUNTER
Okay with me to go down to 20 mg of rosuvastatin. She would need to get a CMP and lipids in 3 months.

## 2022-02-01 ENCOUNTER — PATIENT MESSAGE (OUTPATIENT)
Dept: FAMILY MEDICINE CLINIC | Facility: CLINIC | Age: 72
End: 2022-02-01

## 2022-02-01 RX ORDER — ROSUVASTATIN CALCIUM 20 MG/1
20 TABLET, COATED ORAL NIGHTLY
Qty: 90 TABLET | Refills: 0 | Status: SHIPPED | OUTPATIENT
Start: 2022-02-01

## 2022-03-16 NOTE — TELEPHONE ENCOUNTER
[facility]  CLINIC NOTE    Patient Name: Placido Kilgore III  YOB: 1973    PRESENTING HISTORY       History of Present Illness:  Mr. Placido Kilgore III is a 48 y.o. male is here for bilateral leg pain mostly around the knees radiating down to the calf muscles x 4-6 weeks    Patient has known:  Uncontrolled DM - A1C>13 and was off insulin for a long time due to incarceration. Recently saw Dr Logan in 22 and was prescribed metformin and lantus but has been waiting for prior authorization from PCP. Patient has noted BG in 350s at home yesterday. Compliant with metformin.   Leg pain - started 4-6 weeks ago since being released from incarceration. Pain most in the morning when waking up but lately has been persisting through the day and has made ambulation very painful. Patient feels the pain is similar to neuropathy that he has had from DM but is worried about worsening severity over the last few weeks. Patient has not tried any tylenol or nsaid for that yet. Denies swelling, trauma, erythema, recent immobilization, fever, chills, rash/skin changes. Notes being told having osteoarthritis in the past but cannot find anything on the chart (says it was all at [facility])  Nausea/vomiting - no red flag signs but symptoms consistent with delayed gastric emptying and will benefit from Gi referral for evaluation. Baseline 2-3 emesis every day and feels stomach is always full. No constipation or diarrhea.   Blurry vision - needs appt for retinopathy and diabetes eye check up.     Smokin.5 packs/day for many years  Alcohol:Occasional  Sexually:  Active  Sick Contacts: None    Pertinent negative per ROS    Review of Systems   Constitutional: Negative for chills, fever and malaise/fatigue.   HENT: Negative for congestion and sore throat.    Respiratory: Negative for cough, hemoptysis, sputum production and shortness of breath.    Cardiovascular: Negative for chest pain, palpitations,  From: Valentin Valerio  To: Naresh Kline MD  Sent: 4/1/2019 7:57 AM CDT  Subject: Non-Urgent Medical Question    I would like to know if Dr Ayana Ashley can write me a note to [facility]. They will give me credit for January & February "orthopnea, claudication, leg swelling and PND.   Gastrointestinal: Positive for nausea and vomiting. Negative for abdominal pain, constipation, diarrhea and heartburn.   Genitourinary: Negative for dysuria and hematuria.   Musculoskeletal: Positive for falls, joint pain and myalgias. Negative for back pain and neck pain.   Skin: Negative for rash.   Neurological: Negative for dizziness, tingling, tremors, sensory change, weakness and headaches.   Psychiatric/Behavioral: Negative for depression. The patient is not nervous/anxious and does not have insomnia.        PAST HISTORY:     Past Medical History:   Diagnosis Date    Coronary artery disease     Current smoker 4/13/2017    Hypertension     Mixed hyperlipidemia 4/13/2017    Obesity     Obstructive sleep apnea (adult) (pediatric)     Overweight (BMI 25.0-29.9) 4/13/2017    Stutter 4/13/2017    Type 2 diabetes mellitus with diabetic polyneuropathy, with long-term current use of insulin 4/13/2017    Type 2 diabetes mellitus with hyperglycemia, without long-term current use of insulin 4/13/2017       No past surgical history on file.    Family History   Problem Relation Age of Onset    Diabetes Mother     Birth defects Maternal Uncle         colon CA    Birth defects Paternal Grandfather         colon CA    Alcohol abuse Neg Hx        Social History     Socioeconomic History    Marital status:    Tobacco Use    Smoking status: Current Every Day Smoker     Packs/day: 1.00     Types: Cigarettes    Smokeless tobacco: Never Used   Substance and Sexual Activity    Alcohol use: Yes    Drug use: No       MEDICATIONS & ALLERGIES:     Current Outpatient Medications on File Prior to Visit   Medication Sig    metFORMIN (GLUCOPHAGE) 500 MG tablet Take 1 tablet (500 mg total) by mouth 2 (two) times daily with meals.    pen needle, diabetic 32 gauge x 5/32" Ndle 10 Units by Misc.(Non-Drug; Combo Route) route every evening.    [DISCONTINUED] insulin " "(LANTUS SOLOSTAR U-100 INSULIN) glargine 100 units/mL (3mL) SubQ pen Inject 10 Units into the skin every evening.     No current facility-administered medications on file prior to visit.       Review of patient's allergies indicates:  No Known Allergies    OBJECTIVE:   Vital Signs:  Vitals:    03/16/22 1554   BP: 120/82   Pulse: 68   Resp: 12   SpO2: 98%   Weight: 80.5 kg (177 lb 7.5 oz)   Height: 5' 8" (1.727 m)       No results found for this or any previous visit (from the past 24 hour(s)).      Physical Exam  Constitutional:       General: He is not in acute distress.     Appearance: He is not diaphoretic.   HENT:      Head: Normocephalic and atraumatic.      Mouth/Throat:      Pharynx: No oropharyngeal exudate.   Eyes:      General: No scleral icterus.        Right eye: No discharge.         Left eye: No discharge.      Conjunctiva/sclera: Conjunctivae normal.      Pupils: Pupils are equal, round, and reactive to light.   Neck:      Thyroid: No thyromegaly.      Vascular: No JVD.   Cardiovascular:      Rate and Rhythm: Normal rate and regular rhythm.      Pulses: Normal pulses.      Heart sounds: Normal heart sounds.   Pulmonary:      Effort: Pulmonary effort is normal. No respiratory distress.      Breath sounds: Normal breath sounds.   Abdominal:      General: Bowel sounds are normal. There is no distension.      Palpations: Abdomen is soft.      Tenderness: There is no abdominal tenderness.   Musculoskeletal:         General: Tenderness (bilateral distal thighs and lateral side of both knees, no crepitus but severe pain with any ROM at both knees) present. No swelling, deformity or signs of injury. Normal range of motion.      Cervical back: Normal range of motion and neck supple.      Right lower leg: No edema.      Left lower leg: No edema.   Lymphadenopathy:      Cervical: No cervical adenopathy.   Skin:     General: Skin is warm and dry.      Findings: No erythema, lesion or rash.   Neurological:      " General: No focal deficit present.      Mental Status: He is alert and oriented to person, place, and time. Mental status is at baseline.      Sensory: Sensory deficit (both feet) present.      Motor: No weakness.      Gait: Gait normal.   Psychiatric:         Mood and Affect: Mood and affect normal.         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  -     Ambulatory referral/consult to Gastroenterology; Future  -     Ambulatory referral/consult to Ophthalmology; Future  -     Ambulatory referral/consult to Diabetes Education; Future  -     Ambulatory referral/consult to Endocrinology; Future  -     BASIC METABOLIC PANEL; Future  -     HEMOGLOBIN A1C; Future  -     CBC Auto Differential; Future  -     metFORMIN (GLUCOPHAGE) 500 MG tablet; Take 2 tablets (1,000 mg total) by mouth 2 (two) times daily with meals.    Nausea and vomiting due to hyperglycemia  -     Ambulatory referral/consult to Gastroenterology; Future  -     ondansetron (ZOFRAN) 4 MG tablet; Take 1 tablet (4 mg total) by mouth every 12 (twelve) hours as needed for Nausea.    Knee pain, unspecified chronicity, unspecified laterality  -     X-ray Knee Ortho Bilateral; Future    Type 2 diabetes mellitus with diabetic polyneuropathy, with long-term current use of insulin  -     gabapentin (NEURONTIN) 300 MG capsule; Take one capsule at bed time for one week. After that can add one capsule in the morning as well if you are tolerating it.  -     HEMOGLOBIN A1C; Future  -     metFORMIN (GLUCOPHAGE) 500 MG tablet; Take 2 tablets (1,000 mg total) by mouth 2 (two) times daily with meals.    Other orders  -     insulin detemir U-100 (LEVEMIR FLEXTOUCH U-100  INSULN) 100 unit/mL (3 mL) InPn pen; Inject 10 Units into the skin every evening.        Unclear etiology for pain but could be a mixed picture of neuropathy and osteoarthritis  Will get Xray of knees and start symptomatic treatment with gabapentin, tylenol and as needed advil  Endocrinology and diabetes education referral for diabetes  Switch lantus to levemir due to insurance coverage issues  Uptitrate metformin to 1000 bid  Cbc, BMP and A1c today  Gi referral for evaluation of delayed gastric emptying  Ophtho referral for retinopathy/blurry vision and DM  Eye check up    RTC in 6 weeks    Care discussed with Dr. Michoacano Mike MD  Internal Medicine PGY-3

## 2022-03-29 RX ORDER — ESTRADIOL 1 MG/1
1 TABLET ORAL DAILY
Qty: 90 TABLET | Refills: 0 | Status: SHIPPED | OUTPATIENT
Start: 2022-03-29

## 2022-03-29 RX ORDER — HYDROCHLOROTHIAZIDE 50 MG/1
50 TABLET ORAL DAILY
Qty: 90 TABLET | Refills: 0 | Status: SHIPPED | OUTPATIENT
Start: 2022-03-29

## 2022-04-18 ENCOUNTER — OFFICE VISIT (OUTPATIENT)
Dept: FAMILY MEDICINE CLINIC | Facility: CLINIC | Age: 72
End: 2022-04-18
Payer: MEDICARE

## 2022-04-18 VITALS
RESPIRATION RATE: 16 BRPM | TEMPERATURE: 98 F | SYSTOLIC BLOOD PRESSURE: 148 MMHG | HEART RATE: 68 BPM | DIASTOLIC BLOOD PRESSURE: 86 MMHG | BODY MASS INDEX: 33.8 KG/M2 | WEIGHT: 198 LBS | HEIGHT: 64 IN

## 2022-04-18 DIAGNOSIS — E78.00 HYPERCHOLESTEREMIA: ICD-10-CM

## 2022-04-18 DIAGNOSIS — K21.9 HIATAL HERNIA WITH GASTROESOPHAGEAL REFLUX: ICD-10-CM

## 2022-04-18 DIAGNOSIS — F32.A DEPRESSION, UNSPECIFIED DEPRESSION TYPE: Primary | ICD-10-CM

## 2022-04-18 DIAGNOSIS — I10 BENIGN ESSENTIAL HTN: ICD-10-CM

## 2022-04-18 DIAGNOSIS — K44.9 HIATAL HERNIA WITH GASTROESOPHAGEAL REFLUX: ICD-10-CM

## 2022-04-18 PROCEDURE — 99214 OFFICE O/P EST MOD 30 MIN: CPT | Performed by: FAMILY MEDICINE

## 2022-04-18 PROCEDURE — 3008F BODY MASS INDEX DOCD: CPT | Performed by: FAMILY MEDICINE

## 2022-04-18 PROCEDURE — 3077F SYST BP >= 140 MM HG: CPT | Performed by: FAMILY MEDICINE

## 2022-04-18 PROCEDURE — 3079F DIAST BP 80-89 MM HG: CPT | Performed by: FAMILY MEDICINE

## 2022-04-18 RX ORDER — FAMOTIDINE 20 MG/1
20 TABLET, FILM COATED ORAL 2 TIMES DAILY
Qty: 180 TABLET | Refills: 0 | Status: SHIPPED | OUTPATIENT
Start: 2022-04-18

## 2022-04-18 RX ORDER — SERTRALINE HYDROCHLORIDE 25 MG/1
25 TABLET, FILM COATED ORAL DAILY
Qty: 30 TABLET | Refills: 3 | Status: SHIPPED | OUTPATIENT
Start: 2022-04-18

## 2022-04-21 ENCOUNTER — LAB ENCOUNTER (OUTPATIENT)
Dept: LAB | Age: 72
End: 2022-04-21
Attending: FAMILY MEDICINE
Payer: MEDICARE

## 2022-04-21 DIAGNOSIS — E78.00 HYPERCHOLESTEREMIA: ICD-10-CM

## 2022-04-21 LAB
ALBUMIN SERPL-MCNC: 3.5 G/DL (ref 3.4–5)
ALBUMIN/GLOB SERPL: 1 {RATIO} (ref 1–2)
ALP LIVER SERPL-CCNC: 73 U/L
ALT SERPL-CCNC: 29 U/L
ANION GAP SERPL CALC-SCNC: 6 MMOL/L (ref 0–18)
AST SERPL-CCNC: 19 U/L (ref 15–37)
BILIRUB SERPL-MCNC: 0.4 MG/DL (ref 0.1–2)
BUN BLD-MCNC: 14 MG/DL (ref 7–18)
CALCIUM BLD-MCNC: 9.2 MG/DL (ref 8.5–10.1)
CHLORIDE SERPL-SCNC: 105 MMOL/L (ref 98–112)
CHOLEST SERPL-MCNC: 144 MG/DL (ref ?–200)
CO2 SERPL-SCNC: 29 MMOL/L (ref 21–32)
CREAT BLD-MCNC: 1.14 MG/DL
FASTING PATIENT LIPID ANSWER: YES
FASTING STATUS PATIENT QL REPORTED: YES
GLOBULIN PLAS-MCNC: 3.5 G/DL (ref 2.8–4.4)
GLUCOSE BLD-MCNC: 108 MG/DL (ref 70–99)
HDLC SERPL-MCNC: 65 MG/DL (ref 40–59)
LDLC SERPL CALC-MCNC: 59 MG/DL (ref ?–100)
NONHDLC SERPL-MCNC: 79 MG/DL (ref ?–130)
OSMOLALITY SERPL CALC.SUM OF ELEC: 291 MOSM/KG (ref 275–295)
POTASSIUM SERPL-SCNC: 3.6 MMOL/L (ref 3.5–5.1)
PROT SERPL-MCNC: 7 G/DL (ref 6.4–8.2)
SODIUM SERPL-SCNC: 140 MMOL/L (ref 136–145)
TRIGL SERPL-MCNC: 112 MG/DL (ref 30–149)
VLDLC SERPL CALC-MCNC: 16 MG/DL (ref 0–30)

## 2022-04-21 PROCEDURE — 80061 LIPID PANEL: CPT

## 2022-04-21 PROCEDURE — 80053 COMPREHEN METABOLIC PANEL: CPT

## 2022-04-25 RX ORDER — ROSUVASTATIN CALCIUM 20 MG/1
TABLET, COATED ORAL
Qty: 90 TABLET | Refills: 0 | Status: SHIPPED | OUTPATIENT
Start: 2022-04-25

## 2022-07-20 ENCOUNTER — PATIENT MESSAGE (OUTPATIENT)
Dept: FAMILY MEDICINE CLINIC | Facility: CLINIC | Age: 72
End: 2022-07-20

## 2022-07-20 DIAGNOSIS — M62.830 SPASM OF THORACIC BACK MUSCLE: Primary | ICD-10-CM

## 2022-07-21 NOTE — TELEPHONE ENCOUNTER
See below msgs. Were you recommending PT? Saw you in may for left scapula pain. I pended an order if you agree?   Please advise, send back if you agree-pt asking for # to call  Seeing you 7.28

## 2022-07-29 ENCOUNTER — OFFICE VISIT (OUTPATIENT)
Dept: FAMILY MEDICINE CLINIC | Facility: CLINIC | Age: 72
End: 2022-07-29
Payer: MEDICARE

## 2022-07-29 VITALS
TEMPERATURE: 98 F | HEIGHT: 64 IN | HEART RATE: 64 BPM | RESPIRATION RATE: 20 BRPM | WEIGHT: 197 LBS | BODY MASS INDEX: 33.63 KG/M2

## 2022-07-29 DIAGNOSIS — M54.6 ACUTE LEFT-SIDED THORACIC BACK PAIN: Primary | ICD-10-CM

## 2022-07-29 DIAGNOSIS — Z12.31 ENCOUNTER FOR SCREENING MAMMOGRAM FOR MALIGNANT NEOPLASM OF BREAST: ICD-10-CM

## 2022-07-29 DIAGNOSIS — F32.A DEPRESSION, UNSPECIFIED DEPRESSION TYPE: ICD-10-CM

## 2022-07-29 PROCEDURE — 99214 OFFICE O/P EST MOD 30 MIN: CPT | Performed by: FAMILY MEDICINE

## 2022-07-29 PROCEDURE — 3008F BODY MASS INDEX DOCD: CPT | Performed by: FAMILY MEDICINE

## 2022-07-29 RX ORDER — METHYLPREDNISOLONE 4 MG/1
TABLET ORAL
Qty: 21 EACH | Refills: 0 | Status: SHIPPED | OUTPATIENT
Start: 2022-07-29

## 2022-07-29 RX ORDER — METOCLOPRAMIDE 10 MG/1
TABLET ORAL
COMMUNITY
Start: 2022-07-07

## 2022-07-29 RX ORDER — SERTRALINE HYDROCHLORIDE 25 MG/1
25 TABLET, FILM COATED ORAL DAILY
Qty: 90 TABLET | Refills: 1 | Status: SHIPPED | OUTPATIENT
Start: 2022-07-29

## 2022-07-29 RX ORDER — PANTOPRAZOLE SODIUM 20 MG/1
TABLET, DELAYED RELEASE ORAL
COMMUNITY
Start: 2022-07-11

## 2022-08-02 ENCOUNTER — TELEPHONE (OUTPATIENT)
Dept: FAMILY MEDICINE CLINIC | Facility: CLINIC | Age: 72
End: 2022-08-02

## 2022-08-02 RX ORDER — ROSUVASTATIN CALCIUM 20 MG/1
20 TABLET, COATED ORAL NIGHTLY
Qty: 90 TABLET | Refills: 0 | Status: SHIPPED | OUTPATIENT
Start: 2022-08-02

## 2022-08-11 ENCOUNTER — HOSPITAL ENCOUNTER (OUTPATIENT)
Age: 72
Discharge: HOME | End: 2022-08-11
Payer: MEDICARE

## 2022-08-11 ENCOUNTER — PATIENT MESSAGE (OUTPATIENT)
Dept: FAMILY MEDICINE CLINIC | Facility: CLINIC | Age: 72
End: 2022-08-11

## 2022-08-11 VITALS
BODY MASS INDEX: 32.95 KG/M2 | HEIGHT: 64 IN | DIASTOLIC BLOOD PRESSURE: 85 MMHG | TEMPERATURE: 100 F | RESPIRATION RATE: 17 BRPM | WEIGHT: 193 LBS | OXYGEN SATURATION: 97 % | HEART RATE: 59 BPM | SYSTOLIC BLOOD PRESSURE: 147 MMHG

## 2022-08-11 DIAGNOSIS — L73.9 FOLLICULITIS: Primary | ICD-10-CM

## 2022-08-11 PROCEDURE — 99202 OFFICE O/P NEW SF 15 MIN: CPT | Performed by: NURSE PRACTITIONER

## 2022-08-11 RX ORDER — DOXYCYCLINE HYCLATE 100 MG/1
100 CAPSULE ORAL 2 TIMES DAILY
Qty: 20 CAPSULE | Refills: 0 | Status: SHIPPED | OUTPATIENT
Start: 2022-08-11 | End: 2022-08-21

## 2022-08-11 NOTE — TELEPHONE ENCOUNTER
Pt called back on this. Reports she has 6 small red dots at tailbone, no pain, no itching. Per  looks like the bumps \"broke open\"  No fever. Notes a \"hard knot back there\" reports size of tip of pinky. It is red. No pain. I discussed with Dr Herrera Macias. Given these sx's he recommends her going to urgent care for evaluation. She voiced understanding. Will go to [facility].

## 2022-08-11 NOTE — TELEPHONE ENCOUNTER
Rand Billy, BELINDA 8/56/7539 3:05 PM CDT    Urgent care for this I presume?  ----- Message -----  From: Yara Valerio  Sent: 8/11/2022 7:46 AM CDT  To: [facility] Clinical Staff  Subject: lump & rash     I just notice yesterday a lump and a rash on the back lower spine at the tail bone. Pender Mupirocin cream and taking Penicillin VK 500mg that I had on hand. NO fever. I am not sure what this is. Is there any way I can see Dr Abigail Ayala today or tomorrow. Thank you.

## 2022-08-11 NOTE — ED INITIAL ASSESSMENT (HPI)
Pt noted a red lump on her tailbone yesterday and  noted some ed bumps around it.   Pt used mupirocin, ad took penicillin

## 2022-08-22 ENCOUNTER — TELEPHONE (OUTPATIENT)
Dept: PHYSICAL THERAPY | Facility: HOSPITAL | Age: 72
End: 2022-08-22

## 2022-08-24 ENCOUNTER — OFFICE VISIT (OUTPATIENT)
Dept: PHYSICAL THERAPY | Facility: HOSPITAL | Age: 72
End: 2022-08-24
Attending: FAMILY MEDICINE
Payer: MEDICARE

## 2022-08-24 PROCEDURE — 97161 PT EVAL LOW COMPLEX 20 MIN: CPT

## 2022-08-24 PROCEDURE — 97140 MANUAL THERAPY 1/> REGIONS: CPT

## 2022-08-24 PROCEDURE — 97110 THERAPEUTIC EXERCISES: CPT

## 2022-08-26 ENCOUNTER — OFFICE VISIT (OUTPATIENT)
Dept: PHYSICAL THERAPY | Facility: HOSPITAL | Age: 72
End: 2022-08-26
Attending: FAMILY MEDICINE
Payer: MEDICARE

## 2022-08-26 PROCEDURE — 97110 THERAPEUTIC EXERCISES: CPT

## 2022-08-26 PROCEDURE — 97140 MANUAL THERAPY 1/> REGIONS: CPT

## 2022-08-30 ENCOUNTER — TELEPHONE (OUTPATIENT)
Dept: PHYSICAL THERAPY | Facility: HOSPITAL | Age: 72
End: 2022-08-30

## 2022-08-30 ENCOUNTER — PATIENT MESSAGE (OUTPATIENT)
Dept: PHYSICAL THERAPY | Facility: HOSPITAL | Age: 72
End: 2022-08-30

## 2022-08-30 ENCOUNTER — PATIENT MESSAGE (OUTPATIENT)
Dept: FAMILY MEDICINE CLINIC | Facility: CLINIC | Age: 72
End: 2022-08-30

## 2022-08-30 DIAGNOSIS — R09.89 LABILE HYPERTENSION: Primary | ICD-10-CM

## 2022-08-30 NOTE — TELEPHONE ENCOUNTER
From: Elvis Valerio  To: Arnel Zapata MD  Sent: 8/30/2022 9:45 AM CDT  Subject: blood pressure    Good morning, the last office visit DR Lindsay Mcgill wanted me to take my blood pressure readings here the latest I have. 132/82 P50  142/82 P46  134/88 P53  137/83 P57  131/83 P49  131/84 P50  153/90 P47  135/85 P48  I plan to stay on medications unless he decides to change it. Thank You.

## 2022-08-31 ENCOUNTER — APPOINTMENT (OUTPATIENT)
Dept: PHYSICAL THERAPY | Facility: HOSPITAL | Age: 72
End: 2022-08-31
Attending: FAMILY MEDICINE
Payer: MEDICARE

## 2022-08-31 PROCEDURE — 97140 MANUAL THERAPY 1/> REGIONS: CPT

## 2022-08-31 PROCEDURE — 97110 THERAPEUTIC EXERCISES: CPT

## 2022-08-31 NOTE — TELEPHONE ENCOUNTER
LVM inquiring about the hypertension medication that she is currently taking.  Informed her that our phones were off and that she can call back tomorrow morning to confirm

## 2022-09-01 RX ORDER — HYDRALAZINE HYDROCHLORIDE 50 MG/1
50 TABLET, FILM COATED ORAL 2 TIMES DAILY
Qty: 180 TABLET | Refills: 0 | Status: SHIPPED | OUTPATIENT
Start: 2022-09-01

## 2022-09-01 NOTE — TELEPHONE ENCOUNTER
Pt called back. She is taking the following medication for her blood pressure:     hydrochlorothiazide 50 mg 1 daily   Losartan 100 mg 1 daily   Hydralazine 25 mg 1 twice daily.

## 2022-09-01 NOTE — TELEPHONE ENCOUNTER
I called and instructed pt per Dr. Mary Hernandez orders to increase her hydralazine to 50 mg twice daily and obtain an aldosterone/ renin ratio. Orders placed.  Pt. Agreed to plan and verbalized understanding

## 2022-09-06 ENCOUNTER — OFFICE VISIT (OUTPATIENT)
Dept: PHYSICAL THERAPY | Facility: HOSPITAL | Age: 72
End: 2022-09-06
Attending: FAMILY MEDICINE
Payer: MEDICARE

## 2022-09-06 PROCEDURE — 97110 THERAPEUTIC EXERCISES: CPT

## 2022-09-06 PROCEDURE — 97140 MANUAL THERAPY 1/> REGIONS: CPT

## 2022-09-07 ENCOUNTER — TELEPHONE (OUTPATIENT)
Dept: PHYSICAL THERAPY | Age: 72
End: 2022-09-07

## 2022-09-07 ENCOUNTER — TELEPHONE (OUTPATIENT)
Dept: PHYSICAL THERAPY | Facility: HOSPITAL | Age: 72
End: 2022-09-07

## 2022-09-08 ENCOUNTER — APPOINTMENT (OUTPATIENT)
Dept: PHYSICAL THERAPY | Facility: HOSPITAL | Age: 72
End: 2022-09-08
Attending: FAMILY MEDICINE
Payer: MEDICARE

## 2022-09-09 ENCOUNTER — LAB ENCOUNTER (OUTPATIENT)
Dept: LAB | Age: 72
End: 2022-09-09
Attending: FAMILY MEDICINE
Payer: MEDICARE

## 2022-09-09 ENCOUNTER — OFFICE VISIT (OUTPATIENT)
Dept: PHYSICAL THERAPY | Facility: HOSPITAL | Age: 72
End: 2022-09-09
Attending: FAMILY MEDICINE
Payer: MEDICARE

## 2022-09-09 DIAGNOSIS — R09.89 LABILE HYPERTENSION: ICD-10-CM

## 2022-09-09 PROCEDURE — 82088 ASSAY OF ALDOSTERONE: CPT

## 2022-09-09 PROCEDURE — 97140 MANUAL THERAPY 1/> REGIONS: CPT

## 2022-09-09 PROCEDURE — 84244 ASSAY OF RENIN: CPT

## 2022-09-09 PROCEDURE — 36415 COLL VENOUS BLD VENIPUNCTURE: CPT

## 2022-09-09 PROCEDURE — 97110 THERAPEUTIC EXERCISES: CPT

## 2022-09-13 LAB
ALDOSTERONE/RENIN ACTIVITY RATIO: 10.2 RATIO
ALDOSTERONE: 12.2 NG/DL
RENIN ACTIVITY: 1.2 NG/ML/HR

## 2022-09-14 ENCOUNTER — PATIENT MESSAGE (OUTPATIENT)
Dept: FAMILY MEDICINE CLINIC | Facility: CLINIC | Age: 72
End: 2022-09-14

## 2022-09-14 ENCOUNTER — OFFICE VISIT (OUTPATIENT)
Dept: PHYSICAL THERAPY | Facility: HOSPITAL | Age: 72
End: 2022-09-14
Attending: FAMILY MEDICINE
Payer: MEDICARE

## 2022-09-14 PROCEDURE — 97110 THERAPEUTIC EXERCISES: CPT

## 2022-09-14 NOTE — TELEPHONE ENCOUNTER
Just monitor for now
Kathy Noble LPN 4/95/1614 0:10 PM CDT    Normal results seen on online-any changes to her bp regimen? Or should she just monitor?    ----- Message -----  From: Jamison Valerio  Sent: 9/14/2022 11:27 AM CDT  To: [facility] Clinical Staff  Subject: test results     Hi just saw the results online.  Any comments from   DR Valeria Pedersen  Blood pressures are good today 135/82 56
No

## 2022-09-16 ENCOUNTER — APPOINTMENT (OUTPATIENT)
Dept: PHYSICAL THERAPY | Facility: HOSPITAL | Age: 72
End: 2022-09-16
Attending: FAMILY MEDICINE
Payer: MEDICARE

## 2022-09-21 ENCOUNTER — APPOINTMENT (OUTPATIENT)
Dept: PHYSICAL THERAPY | Facility: HOSPITAL | Age: 72
End: 2022-09-21
Attending: FAMILY MEDICINE
Payer: MEDICARE

## 2022-09-29 DIAGNOSIS — I10 BENIGN ESSENTIAL HTN: ICD-10-CM

## 2022-09-29 DIAGNOSIS — Z78.0 POSTMENOPAUSAL: ICD-10-CM

## 2022-09-29 DIAGNOSIS — N95.1 FLUSHING, MENOPAUSAL: ICD-10-CM

## 2022-09-29 RX ORDER — HYDROCHLOROTHIAZIDE 50 MG/1
TABLET ORAL
Qty: 90 TABLET | Refills: 0 | Status: SHIPPED | OUTPATIENT
Start: 2022-09-29

## 2022-09-29 RX ORDER — ESTRADIOL 1 MG/1
TABLET ORAL
Qty: 90 TABLET | Refills: 0 | Status: SHIPPED | OUTPATIENT
Start: 2022-09-29

## 2022-10-25 ENCOUNTER — PATIENT MESSAGE (OUTPATIENT)
Dept: FAMILY MEDICINE CLINIC | Facility: CLINIC | Age: 72
End: 2022-10-25

## 2022-10-25 DIAGNOSIS — Z13.820 ENCOUNTER FOR SCREENING FOR OSTEOPOROSIS: Primary | ICD-10-CM

## 2022-10-25 NOTE — TELEPHONE ENCOUNTER
From: Valeria Valerio  To: Eder David MD  Sent: 10/25/2022 12:06 PM CDT  Subject: Orders    Hi, I am going to call and schedule my Mammogram in November. I haven't had a bone density test since 2017. Do you think I should have one this year? If so, please add that to my orders. Thank you.

## 2022-10-26 ENCOUNTER — LAB ENCOUNTER (OUTPATIENT)
Dept: LAB | Age: 72
End: 2022-10-26
Attending: FAMILY MEDICINE
Payer: MEDICARE

## 2022-10-26 DIAGNOSIS — E78.00 HYPERCHOLESTEREMIA: ICD-10-CM

## 2022-10-26 DIAGNOSIS — E55.9 VITAMIN D DEFICIENCY: ICD-10-CM

## 2022-10-26 DIAGNOSIS — I10 BENIGN ESSENTIAL HTN: ICD-10-CM

## 2022-10-26 DIAGNOSIS — R73.9 ELEVATED BLOOD SUGAR: ICD-10-CM

## 2022-10-26 LAB
ALBUMIN SERPL-MCNC: 3.9 G/DL (ref 3.4–5)
ALBUMIN/GLOB SERPL: 1.2 {RATIO} (ref 1–2)
ALP LIVER SERPL-CCNC: 63 U/L
ALT SERPL-CCNC: 26 U/L
ANION GAP SERPL CALC-SCNC: 6 MMOL/L (ref 0–18)
AST SERPL-CCNC: 22 U/L (ref 15–37)
BASOPHILS # BLD AUTO: 0.05 X10(3) UL (ref 0–0.2)
BASOPHILS NFR BLD AUTO: 0.8 %
BILIRUB SERPL-MCNC: 0.4 MG/DL (ref 0.1–2)
BUN BLD-MCNC: 20 MG/DL (ref 7–18)
BUN/CREAT SERPL: 16.8 (ref 10–20)
CALCIUM BLD-MCNC: 10 MG/DL (ref 8.5–10.1)
CHLORIDE SERPL-SCNC: 103 MMOL/L (ref 98–112)
CHOLEST SERPL-MCNC: 155 MG/DL (ref ?–200)
CO2 SERPL-SCNC: 27 MMOL/L (ref 21–32)
CREAT BLD-MCNC: 1.19 MG/DL
DEPRECATED RDW RBC AUTO: 45.2 FL (ref 35.1–46.3)
EOSINOPHIL # BLD AUTO: 0.21 X10(3) UL (ref 0–0.7)
EOSINOPHIL NFR BLD AUTO: 3.3 %
ERYTHROCYTE [DISTWIDTH] IN BLOOD BY AUTOMATED COUNT: 13.6 % (ref 11–15)
EST. AVERAGE GLUCOSE BLD GHB EST-MCNC: 120 MG/DL (ref 68–126)
FASTING PATIENT LIPID ANSWER: YES
FASTING STATUS PATIENT QL REPORTED: YES
GFR SERPLBLD BASED ON 1.73 SQ M-ARVRAT: 49 ML/MIN/1.73M2 (ref 60–?)
GLOBULIN PLAS-MCNC: 3.3 G/DL (ref 2.8–4.4)
GLUCOSE BLD-MCNC: 101 MG/DL (ref 70–99)
HBA1C MFR BLD: 5.8 % (ref ?–5.7)
HCT VFR BLD AUTO: 40 %
HDLC SERPL-MCNC: 71 MG/DL (ref 40–59)
HGB BLD-MCNC: 13 G/DL
IMM GRANULOCYTES # BLD AUTO: 0.01 X10(3) UL (ref 0–1)
IMM GRANULOCYTES NFR BLD: 0.2 %
LDLC SERPL CALC-MCNC: 59 MG/DL (ref ?–100)
LYMPHOCYTES # BLD AUTO: 1.34 X10(3) UL (ref 1–4)
LYMPHOCYTES NFR BLD AUTO: 21.1 %
MCH RBC QN AUTO: 29.3 PG (ref 26–34)
MCHC RBC AUTO-ENTMCNC: 32.5 G/DL (ref 31–37)
MCV RBC AUTO: 90.3 FL
MONOCYTES # BLD AUTO: 0.6 X10(3) UL (ref 0.1–1)
MONOCYTES NFR BLD AUTO: 9.4 %
NEUTROPHILS # BLD AUTO: 4.14 X10 (3) UL (ref 1.5–7.7)
NEUTROPHILS # BLD AUTO: 4.14 X10(3) UL (ref 1.5–7.7)
NEUTROPHILS NFR BLD AUTO: 65.2 %
NONHDLC SERPL-MCNC: 84 MG/DL (ref ?–130)
OSMOLALITY SERPL CALC.SUM OF ELEC: 285 MOSM/KG (ref 275–295)
PLATELET # BLD AUTO: 263 10(3)UL (ref 150–450)
POTASSIUM SERPL-SCNC: 4.1 MMOL/L (ref 3.5–5.1)
PROT SERPL-MCNC: 7.2 G/DL (ref 6.4–8.2)
RBC # BLD AUTO: 4.43 X10(6)UL
SODIUM SERPL-SCNC: 136 MMOL/L (ref 136–145)
TRIGL SERPL-MCNC: 146 MG/DL (ref 30–149)
VIT D+METAB SERPL-MCNC: 35.2 NG/ML (ref 30–100)
VLDLC SERPL CALC-MCNC: 21 MG/DL (ref 0–30)
WBC # BLD AUTO: 6.4 X10(3) UL (ref 4–11)

## 2022-10-26 PROCEDURE — 80061 LIPID PANEL: CPT

## 2022-10-26 PROCEDURE — 83036 HEMOGLOBIN GLYCOSYLATED A1C: CPT

## 2022-10-26 PROCEDURE — 85025 COMPLETE CBC W/AUTO DIFF WBC: CPT

## 2022-10-26 PROCEDURE — 82306 VITAMIN D 25 HYDROXY: CPT

## 2022-10-26 PROCEDURE — 36415 COLL VENOUS BLD VENIPUNCTURE: CPT

## 2022-10-26 PROCEDURE — 80053 COMPREHEN METABOLIC PANEL: CPT

## 2022-10-27 DIAGNOSIS — E78.00 HYPERCHOLESTEREMIA: Primary | ICD-10-CM

## 2022-11-14 RX ORDER — ROSUVASTATIN CALCIUM 20 MG/1
TABLET, COATED ORAL
Qty: 90 TABLET | Refills: 0 | Status: SHIPPED | OUTPATIENT
Start: 2022-11-14

## 2022-11-15 ENCOUNTER — HOSPITAL ENCOUNTER (OUTPATIENT)
Dept: MAMMOGRAPHY | Age: 72
Discharge: HOME | End: 2022-11-15
Attending: FAMILY MEDICINE
Payer: MEDICARE

## 2022-11-15 ENCOUNTER — HOSPITAL ENCOUNTER (OUTPATIENT)
Dept: BONE DENSITY | Age: 72
Discharge: HOME | End: 2022-11-15
Attending: FAMILY MEDICINE
Payer: MEDICARE

## 2022-11-15 DIAGNOSIS — Z12.31 ENCOUNTER FOR SCREENING MAMMOGRAM FOR MALIGNANT NEOPLASM OF BREAST: ICD-10-CM

## 2022-11-15 DIAGNOSIS — Z13.820 ENCOUNTER FOR SCREENING FOR OSTEOPOROSIS: ICD-10-CM

## 2022-11-15 PROCEDURE — 77067 SCR MAMMO BI INCL CAD: CPT | Performed by: FAMILY MEDICINE

## 2022-11-15 PROCEDURE — 77063 BREAST TOMOSYNTHESIS BI: CPT | Performed by: FAMILY MEDICINE

## 2022-11-15 PROCEDURE — 77080 DXA BONE DENSITY AXIAL: CPT | Performed by: FAMILY MEDICINE

## 2022-11-18 ENCOUNTER — HOSPITAL ENCOUNTER (OUTPATIENT)
Age: 72
Discharge: HOME | End: 2022-11-18
Payer: MEDICARE

## 2022-11-18 VITALS
DIASTOLIC BLOOD PRESSURE: 90 MMHG | SYSTOLIC BLOOD PRESSURE: 161 MMHG | OXYGEN SATURATION: 98 % | TEMPERATURE: 98 F | RESPIRATION RATE: 16 BRPM | HEART RATE: 60 BPM

## 2022-11-18 DIAGNOSIS — H65.01 NON-RECURRENT ACUTE SEROUS OTITIS MEDIA OF RIGHT EAR: Primary | ICD-10-CM

## 2022-11-18 PROCEDURE — 99213 OFFICE O/P EST LOW 20 MIN: CPT | Performed by: NURSE PRACTITIONER

## 2022-11-18 RX ORDER — AMOXICILLIN AND CLAVULANATE POTASSIUM 875; 125 MG/1; MG/1
1 TABLET, FILM COATED ORAL 2 TIMES DAILY
Qty: 20 TABLET | Refills: 0 | Status: SHIPPED | OUTPATIENT
Start: 2022-11-18 | End: 2022-11-28

## 2022-11-18 NOTE — ED INITIAL ASSESSMENT (HPI)
Severe intermittent right ear pain began yesterday, 9/10 pain. Denies runny nose, congestion, fever, sore throat.

## 2022-11-18 NOTE — DISCHARGE INSTRUCTIONS
Take antibiotics as prescribed  Go directly to emergency department for severe ear pain, sudden inability to hearing, swelling to your face behind ears or in your neck  You may also take ibuprofen or Tylenol for pain  Avoid swimming or submerging your head in water until you finish antibiotics

## 2022-12-04 DIAGNOSIS — N95.1 FLUSHING, MENOPAUSAL: ICD-10-CM

## 2022-12-04 DIAGNOSIS — Z78.0 POSTMENOPAUSAL: ICD-10-CM

## 2022-12-05 RX ORDER — ESTRADIOL 1 MG/1
TABLET ORAL
Qty: 90 TABLET | Refills: 0 | Status: SHIPPED | OUTPATIENT
Start: 2022-12-05

## 2022-12-05 RX ORDER — HYDRALAZINE HYDROCHLORIDE 50 MG/1
TABLET, FILM COATED ORAL
Qty: 180 TABLET | Refills: 0 | Status: SHIPPED | OUTPATIENT
Start: 2022-12-05

## 2022-12-14 ENCOUNTER — PATIENT MESSAGE (OUTPATIENT)
Dept: FAMILY MEDICINE CLINIC | Facility: CLINIC | Age: 72
End: 2022-12-14

## 2022-12-14 DIAGNOSIS — K64.9 HEMORRHOIDS, UNSPECIFIED HEMORRHOID TYPE: Primary | ICD-10-CM

## 2022-12-15 NOTE — TELEPHONE ENCOUNTER
From: Aleks Valerio  To: Harsha Hargrove MD  Sent: 12/14/2022 11:42 AM CST  Subject: Referral    HI, I had my colonoscopy yesterday with Dr Ramiro Garcia. Results were good except for a hemorrhoid  I would like a referral to a physician who can perform the rubber band procedure. Thank you.

## 2022-12-29 DIAGNOSIS — I10 BENIGN ESSENTIAL HTN: ICD-10-CM

## 2022-12-29 RX ORDER — HYDROCHLOROTHIAZIDE 50 MG/1
TABLET ORAL
Qty: 90 TABLET | Refills: 0 | Status: SHIPPED | OUTPATIENT
Start: 2022-12-29

## 2023-02-05 ENCOUNTER — PATIENT MESSAGE (OUTPATIENT)
Dept: FAMILY MEDICINE CLINIC | Facility: CLINIC | Age: 73
End: 2023-02-05

## 2023-02-06 NOTE — TELEPHONE ENCOUNTER
From: Palmira Valerio  To: Bacilio Byrnse MD  Sent: 2/5/2023 4:24 PM CST  Subject: Office visit    When I suppose to see Dr Kylie Velazquez for a visit? I know I have lab work due in April. Thanks.

## 2023-02-09 RX ORDER — ROSUVASTATIN CALCIUM 20 MG/1
TABLET, COATED ORAL
Qty: 90 TABLET | Refills: 0 | Status: SHIPPED | OUTPATIENT
Start: 2023-02-09

## 2023-02-23 DIAGNOSIS — F32.A DEPRESSION, UNSPECIFIED DEPRESSION TYPE: ICD-10-CM

## 2023-02-23 RX ORDER — SERTRALINE HYDROCHLORIDE 25 MG/1
TABLET, FILM COATED ORAL
Qty: 90 TABLET | Refills: 0 | Status: SHIPPED | OUTPATIENT
Start: 2023-02-23

## 2023-02-26 ENCOUNTER — HOSPITAL ENCOUNTER (OUTPATIENT)
Age: 73
Discharge: ANOTHER HOSPITAL | End: 2023-02-26
Payer: MEDICARE

## 2023-02-26 ENCOUNTER — APPOINTMENT (OUTPATIENT)
Dept: CT IMAGING | Facility: HOSPITAL | Age: 73
End: 2023-02-26
Attending: EMERGENCY MEDICINE
Payer: MEDICARE

## 2023-02-26 ENCOUNTER — HOSPITAL ENCOUNTER (EMERGENCY)
Facility: HOSPITAL | Age: 73
Discharge: HOME | End: 2023-02-26
Attending: EMERGENCY MEDICINE
Payer: MEDICARE

## 2023-02-26 VITALS
OXYGEN SATURATION: 96 % | HEART RATE: 57 BPM | TEMPERATURE: 99 F | BODY MASS INDEX: 32.27 KG/M2 | DIASTOLIC BLOOD PRESSURE: 87 MMHG | WEIGHT: 189 LBS | SYSTOLIC BLOOD PRESSURE: 150 MMHG | RESPIRATION RATE: 16 BRPM | HEIGHT: 64 IN

## 2023-02-26 VITALS
BODY MASS INDEX: 32.27 KG/M2 | WEIGHT: 189 LBS | HEART RATE: 66 BPM | RESPIRATION RATE: 16 BRPM | HEIGHT: 64 IN | SYSTOLIC BLOOD PRESSURE: 143 MMHG | DIASTOLIC BLOOD PRESSURE: 84 MMHG | TEMPERATURE: 98 F | OXYGEN SATURATION: 98 %

## 2023-02-26 DIAGNOSIS — R19.7 DIARRHEA, UNSPECIFIED TYPE: ICD-10-CM

## 2023-02-26 DIAGNOSIS — R10.9 ABDOMINAL PAIN OF UNKNOWN ETIOLOGY: Primary | ICD-10-CM

## 2023-02-26 DIAGNOSIS — R19.7 DIARRHEA, UNSPECIFIED TYPE: Primary | ICD-10-CM

## 2023-02-26 DIAGNOSIS — E86.0 DEHYDRATION: ICD-10-CM

## 2023-02-26 DIAGNOSIS — K52.9 ACUTE COLITIS: ICD-10-CM

## 2023-02-26 LAB
ALBUMIN SERPL-MCNC: 3.4 G/DL (ref 3.4–5)
ALBUMIN/GLOB SERPL: 0.8 {RATIO} (ref 1–2)
ALP LIVER SERPL-CCNC: 66 U/L
ALT SERPL-CCNC: 29 U/L
ANION GAP SERPL CALC-SCNC: 8 MMOL/L (ref 0–18)
AST SERPL-CCNC: 30 U/L (ref 15–37)
BASOPHILS # BLD AUTO: 0.05 X10(3) UL (ref 0–0.2)
BASOPHILS NFR BLD AUTO: 0.6 %
BILIRUB SERPL-MCNC: 0.4 MG/DL (ref 0.1–2)
BILIRUB UR QL STRIP.AUTO: NEGATIVE
BUN BLD-MCNC: 21 MG/DL (ref 7–18)
C DIFF TOX B STL QL: NEGATIVE
CALCIUM BLD-MCNC: 10.4 MG/DL (ref 8.5–10.1)
CHLORIDE SERPL-SCNC: 102 MMOL/L (ref 98–112)
CLARITY UR REFRACT.AUTO: CLEAR
CO2 SERPL-SCNC: 29 MMOL/L (ref 21–32)
COLOR UR AUTO: YELLOW
CREAT BLD-MCNC: 1.09 MG/DL
EOSINOPHIL # BLD AUTO: 0.23 X10(3) UL (ref 0–0.7)
EOSINOPHIL NFR BLD AUTO: 2.8 %
ERYTHROCYTE [DISTWIDTH] IN BLOOD BY AUTOMATED COUNT: 13.2 %
GFR SERPLBLD BASED ON 1.73 SQ M-ARVRAT: 54 ML/MIN/1.73M2 (ref 60–?)
GLOBULIN PLAS-MCNC: 4.5 G/DL (ref 2.8–4.4)
GLUCOSE BLD-MCNC: 116 MG/DL (ref 70–99)
GLUCOSE UR STRIP.AUTO-MCNC: NEGATIVE MG/DL
HCT VFR BLD AUTO: 38.6 %
HGB BLD-MCNC: 12.7 G/DL
IMM GRANULOCYTES # BLD AUTO: 0.01 X10(3) UL (ref 0–1)
IMM GRANULOCYTES NFR BLD: 0.1 %
KETONES UR STRIP.AUTO-MCNC: NEGATIVE MG/DL
LEUKOCYTE ESTERASE UR QL STRIP.AUTO: NEGATIVE
LIPASE SERPL-CCNC: 18 U/L (ref 13–75)
LIPASE SERPL-CCNC: 58 U/L (ref 73–393)
LYMPHOCYTES # BLD AUTO: 1.54 X10(3) UL (ref 1–4)
LYMPHOCYTES NFR BLD AUTO: 18.5 %
MCH RBC QN AUTO: 28.9 PG (ref 26–34)
MCHC RBC AUTO-ENTMCNC: 32.9 G/DL (ref 31–37)
MCV RBC AUTO: 87.9 FL
MONOCYTES # BLD AUTO: 0.77 X10(3) UL (ref 0.1–1)
MONOCYTES NFR BLD AUTO: 9.3 %
NEUTROPHILS # BLD AUTO: 5.71 X10 (3) UL (ref 1.5–7.7)
NEUTROPHILS # BLD AUTO: 5.71 X10(3) UL (ref 1.5–7.7)
NEUTROPHILS NFR BLD AUTO: 68.7 %
NITRITE UR QL STRIP.AUTO: NEGATIVE
OSMOLALITY SERPL CALC.SUM OF ELEC: 292 MOSM/KG (ref 275–295)
PH UR STRIP.AUTO: 6 [PH] (ref 5–8)
PLATELET # BLD AUTO: 274 10(3)UL (ref 150–450)
POTASSIUM SERPL-SCNC: 3.4 MMOL/L (ref 3.5–5.1)
PROT SERPL-MCNC: 7.9 G/DL (ref 6.4–8.2)
PROT UR STRIP.AUTO-MCNC: NEGATIVE MG/DL
RBC # BLD AUTO: 4.39 X10(6)UL
RBC UR QL AUTO: NEGATIVE
SODIUM SERPL-SCNC: 139 MMOL/L (ref 136–145)
SP GR UR STRIP.AUTO: 1.02 (ref 1–1.03)
UROBILINOGEN UR STRIP.AUTO-MCNC: <2 MG/DL
WBC # BLD AUTO: 8.3 X10(3) UL (ref 4–11)

## 2023-02-26 PROCEDURE — 99284 EMERGENCY DEPT VISIT MOD MDM: CPT

## 2023-02-26 PROCEDURE — 87427 SHIGA-LIKE TOXIN AG IA: CPT | Performed by: EMERGENCY MEDICINE

## 2023-02-26 PROCEDURE — 99215 OFFICE O/P EST HI 40 MIN: CPT | Performed by: NURSE PRACTITIONER

## 2023-02-26 PROCEDURE — 87046 STOOL CULTR AEROBIC BACT EA: CPT | Performed by: EMERGENCY MEDICINE

## 2023-02-26 PROCEDURE — 85025 COMPLETE CBC W/AUTO DIFF WBC: CPT

## 2023-02-26 PROCEDURE — 80053 COMPREHEN METABOLIC PANEL: CPT | Performed by: EMERGENCY MEDICINE

## 2023-02-26 PROCEDURE — 74177 CT ABD & PELVIS W/CONTRAST: CPT | Performed by: EMERGENCY MEDICINE

## 2023-02-26 PROCEDURE — 85025 COMPLETE CBC W/AUTO DIFF WBC: CPT | Performed by: EMERGENCY MEDICINE

## 2023-02-26 PROCEDURE — 87045 FECES CULTURE AEROBIC BACT: CPT | Performed by: EMERGENCY MEDICINE

## 2023-02-26 PROCEDURE — 87493 C DIFF AMPLIFIED PROBE: CPT | Performed by: EMERGENCY MEDICINE

## 2023-02-26 PROCEDURE — 82272 OCCULT BLD FECES 1-3 TESTS: CPT | Performed by: EMERGENCY MEDICINE

## 2023-02-26 PROCEDURE — 99285 EMERGENCY DEPT VISIT HI MDM: CPT

## 2023-02-26 PROCEDURE — 83690 ASSAY OF LIPASE: CPT | Performed by: EMERGENCY MEDICINE

## 2023-02-26 PROCEDURE — 96375 TX/PRO/DX INJ NEW DRUG ADDON: CPT

## 2023-02-26 PROCEDURE — 80053 COMPREHEN METABOLIC PANEL: CPT

## 2023-02-26 PROCEDURE — 81001 URINALYSIS AUTO W/SCOPE: CPT | Performed by: EMERGENCY MEDICINE

## 2023-02-26 PROCEDURE — 96374 THER/PROPH/DIAG INJ IV PUSH: CPT

## 2023-02-26 PROCEDURE — 83690 ASSAY OF LIPASE: CPT

## 2023-02-26 PROCEDURE — 96361 HYDRATE IV INFUSION ADD-ON: CPT

## 2023-02-26 RX ORDER — METHYLPREDNISOLONE SODIUM SUCCINATE 125 MG/2ML
125 INJECTION, POWDER, LYOPHILIZED, FOR SOLUTION INTRAMUSCULAR; INTRAVENOUS ONCE
Status: COMPLETED | OUTPATIENT
Start: 2023-02-26 | End: 2023-02-26

## 2023-02-26 RX ORDER — DIPHENHYDRAMINE HYDROCHLORIDE 50 MG/ML
25 INJECTION INTRAMUSCULAR; INTRAVENOUS ONCE
Status: COMPLETED | OUTPATIENT
Start: 2023-02-26 | End: 2023-02-26

## 2023-02-26 RX ORDER — SODIUM CHLORIDE 9 MG/ML
INJECTION, SOLUTION INTRAVENOUS CONTINUOUS
Status: DISCONTINUED | OUTPATIENT
Start: 2023-02-26 | End: 2023-02-26

## 2023-02-26 RX ORDER — DICYCLOMINE HCL 20 MG
20 TABLET ORAL 4 TIMES DAILY PRN
Qty: 30 TABLET | Refills: 0 | Status: SHIPPED | OUTPATIENT
Start: 2023-02-26 | End: 2023-03-28

## 2023-02-26 NOTE — ED INITIAL ASSESSMENT (HPI)
Pt c/o lower abdominal cramping and diarrhea x 1 week. Pt states taking imodium with only some relief.  Pt denies fevers, denies N/V.

## 2023-03-02 ENCOUNTER — LAB ENCOUNTER (OUTPATIENT)
Dept: LAB | Facility: HOSPITAL | Age: 73
End: 2023-03-02
Attending: FAMILY MEDICINE
Payer: MEDICARE

## 2023-03-02 ENCOUNTER — OFFICE VISIT (OUTPATIENT)
Dept: FAMILY MEDICINE CLINIC | Facility: CLINIC | Age: 73
End: 2023-03-02
Payer: MEDICARE

## 2023-03-02 VITALS
DIASTOLIC BLOOD PRESSURE: 72 MMHG | WEIGHT: 190 LBS | HEART RATE: 56 BPM | BODY MASS INDEX: 32.44 KG/M2 | SYSTOLIC BLOOD PRESSURE: 126 MMHG | HEIGHT: 64 IN | TEMPERATURE: 99 F | RESPIRATION RATE: 12 BRPM

## 2023-03-02 DIAGNOSIS — R19.7 DIARRHEA, UNSPECIFIED TYPE: Primary | ICD-10-CM

## 2023-03-02 DIAGNOSIS — R19.7 DIARRHEA, UNSPECIFIED TYPE: ICD-10-CM

## 2023-03-02 LAB
ALBUMIN SERPL-MCNC: 3.8 G/DL (ref 3.4–5)
ALBUMIN/GLOB SERPL: 0.9 {RATIO} (ref 1–2)
ALP LIVER SERPL-CCNC: 78 U/L
ALT SERPL-CCNC: 26 U/L
ANION GAP SERPL CALC-SCNC: 4 MMOL/L (ref 0–18)
AST SERPL-CCNC: 23 U/L (ref 15–37)
BASOPHILS # BLD AUTO: 0.03 X10(3) UL (ref 0–0.2)
BASOPHILS NFR BLD AUTO: 0.3 %
BILIRUB SERPL-MCNC: 0.3 MG/DL (ref 0.1–2)
BUN BLD-MCNC: 18 MG/DL (ref 7–18)
CALCIUM BLD-MCNC: 9.7 MG/DL (ref 8.5–10.1)
CHLORIDE SERPL-SCNC: 106 MMOL/L (ref 98–112)
CO2 SERPL-SCNC: 27 MMOL/L (ref 21–32)
CREAT BLD-MCNC: 1.27 MG/DL
EOSINOPHIL # BLD AUTO: 0.19 X10(3) UL (ref 0–0.7)
EOSINOPHIL NFR BLD AUTO: 2.1 %
ERYTHROCYTE [DISTWIDTH] IN BLOOD BY AUTOMATED COUNT: 13.2 %
ERYTHROCYTE [SEDIMENTATION RATE] IN BLOOD: 23 MM/HR
FASTING STATUS PATIENT QL REPORTED: NO
GFR SERPLBLD BASED ON 1.73 SQ M-ARVRAT: 45 ML/MIN/1.73M2 (ref 60–?)
GLOBULIN PLAS-MCNC: 4.1 G/DL (ref 2.8–4.4)
GLUCOSE BLD-MCNC: 121 MG/DL (ref 70–99)
HCT VFR BLD AUTO: 41.1 %
HGB BLD-MCNC: 13.2 G/DL
IMM GRANULOCYTES # BLD AUTO: 0.03 X10(3) UL (ref 0–1)
IMM GRANULOCYTES NFR BLD: 0.3 %
LYMPHOCYTES # BLD AUTO: 2.02 X10(3) UL (ref 1–4)
LYMPHOCYTES NFR BLD AUTO: 22.9 %
MCH RBC QN AUTO: 29 PG (ref 26–34)
MCHC RBC AUTO-ENTMCNC: 32.1 G/DL (ref 31–37)
MCV RBC AUTO: 90.3 FL
MONOCYTES # BLD AUTO: 0.64 X10(3) UL (ref 0.1–1)
MONOCYTES NFR BLD AUTO: 7.2 %
NEUTROPHILS # BLD AUTO: 5.93 X10 (3) UL (ref 1.5–7.7)
NEUTROPHILS # BLD AUTO: 5.93 X10(3) UL (ref 1.5–7.7)
NEUTROPHILS NFR BLD AUTO: 67.2 %
OSMOLALITY SERPL CALC.SUM OF ELEC: 287 MOSM/KG (ref 275–295)
PLATELET # BLD AUTO: 301 10(3)UL (ref 150–450)
POTASSIUM SERPL-SCNC: 2.9 MMOL/L (ref 3.5–5.1)
PROT SERPL-MCNC: 7.9 G/DL (ref 6.4–8.2)
RBC # BLD AUTO: 4.55 X10(6)UL
SODIUM SERPL-SCNC: 137 MMOL/L (ref 136–145)
WBC # BLD AUTO: 8.8 X10(3) UL (ref 4–11)

## 2023-03-02 PROCEDURE — 80053 COMPREHEN METABOLIC PANEL: CPT

## 2023-03-02 PROCEDURE — 85652 RBC SED RATE AUTOMATED: CPT

## 2023-03-02 PROCEDURE — 99213 OFFICE O/P EST LOW 20 MIN: CPT | Performed by: FAMILY MEDICINE

## 2023-03-02 PROCEDURE — 36415 COLL VENOUS BLD VENIPUNCTURE: CPT

## 2023-03-02 PROCEDURE — 85025 COMPLETE CBC W/AUTO DIFF WBC: CPT

## 2023-03-02 PROCEDURE — 1111F DSCHRG MED/CURRENT MED MERGE: CPT | Performed by: FAMILY MEDICINE

## 2023-03-02 PROCEDURE — 3074F SYST BP LT 130 MM HG: CPT | Performed by: FAMILY MEDICINE

## 2023-03-02 PROCEDURE — 3008F BODY MASS INDEX DOCD: CPT | Performed by: FAMILY MEDICINE

## 2023-03-02 PROCEDURE — 3078F DIAST BP <80 MM HG: CPT | Performed by: FAMILY MEDICINE

## 2023-03-02 RX ORDER — HYDRALAZINE HYDROCHLORIDE 50 MG/1
TABLET, FILM COATED ORAL
Qty: 180 TABLET | Refills: 0 | Status: SHIPPED | OUTPATIENT
Start: 2023-03-02

## 2023-03-02 RX ORDER — OXYBUTYNIN CHLORIDE 5 MG/1
TABLET, EXTENDED RELEASE ORAL
COMMUNITY
Start: 2022-11-28

## 2023-03-03 ENCOUNTER — LAB ENCOUNTER (OUTPATIENT)
Dept: LAB | Facility: HOSPITAL | Age: 73
End: 2023-03-03
Attending: FAMILY MEDICINE
Payer: MEDICARE

## 2023-03-03 DIAGNOSIS — R19.7 DIARRHEA, UNSPECIFIED TYPE: ICD-10-CM

## 2023-03-03 LAB
C DIFF TOX B STL QL: NEGATIVE
CRYPTOSP AG STL QL IA: NEGATIVE
G LAMBLIA AG STL QL IA: NEGATIVE

## 2023-03-03 PROCEDURE — 87272 CRYPTOSPORIDIUM AG IF: CPT

## 2023-03-03 PROCEDURE — 87045 FECES CULTURE AEROBIC BACT: CPT

## 2023-03-03 PROCEDURE — 87493 C DIFF AMPLIFIED PROBE: CPT

## 2023-03-03 PROCEDURE — 87427 SHIGA-LIKE TOXIN AG IA: CPT

## 2023-03-03 PROCEDURE — 87177 OVA AND PARASITES SMEARS: CPT

## 2023-03-03 PROCEDURE — 87046 STOOL CULTR AEROBIC BACT EA: CPT

## 2023-03-03 PROCEDURE — 87329 GIARDIA AG IA: CPT

## 2023-03-03 PROCEDURE — 83993 ASSAY FOR CALPROTECTIN FECAL: CPT

## 2023-03-03 PROCEDURE — 87209 SMEAR COMPLEX STAIN: CPT

## 2023-03-04 DIAGNOSIS — Z78.0 POSTMENOPAUSAL: ICD-10-CM

## 2023-03-04 DIAGNOSIS — N95.1 FLUSHING, MENOPAUSAL: ICD-10-CM

## 2023-03-06 ENCOUNTER — LAB ENCOUNTER (OUTPATIENT)
Dept: LAB | Facility: HOSPITAL | Age: 73
End: 2023-03-06
Attending: FAMILY MEDICINE
Payer: MEDICARE

## 2023-03-06 DIAGNOSIS — E87.6 HYPOKALEMIA: ICD-10-CM

## 2023-03-06 LAB
ALBUMIN SERPL-MCNC: 3.5 G/DL (ref 3.4–5)
ALBUMIN/GLOB SERPL: 0.9 {RATIO} (ref 1–2)
ALP LIVER SERPL-CCNC: 66 U/L
ALT SERPL-CCNC: 28 U/L
ANION GAP SERPL CALC-SCNC: 1 MMOL/L (ref 0–18)
AST SERPL-CCNC: 28 U/L (ref 15–37)
BILIRUB SERPL-MCNC: 0.3 MG/DL (ref 0.1–2)
BUN BLD-MCNC: 16 MG/DL (ref 7–18)
CALCIUM BLD-MCNC: 10.5 MG/DL (ref 8.5–10.1)
CALPROTECTIN STL-MCNT: 276 ΜG/G (ref ?–50)
CHLORIDE SERPL-SCNC: 104 MMOL/L (ref 98–112)
CO2 SERPL-SCNC: 33 MMOL/L (ref 21–32)
CREAT BLD-MCNC: 1.12 MG/DL
FASTING STATUS PATIENT QL REPORTED: YES
GFR SERPLBLD BASED ON 1.73 SQ M-ARVRAT: 53 ML/MIN/1.73M2 (ref 60–?)
GLOBULIN PLAS-MCNC: 3.9 G/DL (ref 2.8–4.4)
GLUCOSE BLD-MCNC: 113 MG/DL (ref 70–99)
OSMOLALITY SERPL CALC.SUM OF ELEC: 288 MOSM/KG (ref 275–295)
OVA AND PARASITE, FECAL INTERPRETATION: NEGATIVE
POTASSIUM SERPL-SCNC: 3.4 MMOL/L (ref 3.5–5.1)
PROT SERPL-MCNC: 7.4 G/DL (ref 6.4–8.2)
SODIUM SERPL-SCNC: 138 MMOL/L (ref 136–145)

## 2023-03-06 PROCEDURE — 80053 COMPREHEN METABOLIC PANEL: CPT

## 2023-03-06 PROCEDURE — 36415 COLL VENOUS BLD VENIPUNCTURE: CPT

## 2023-03-06 RX ORDER — ESTRADIOL 1 MG/1
TABLET ORAL
Qty: 90 TABLET | Refills: 0 | Status: SHIPPED | OUTPATIENT
Start: 2023-03-06

## 2023-03-07 DIAGNOSIS — E87.6 HYPOKALEMIA: Primary | ICD-10-CM

## 2023-03-07 DIAGNOSIS — I10 BENIGN ESSENTIAL HTN: ICD-10-CM

## 2023-03-07 RX ORDER — POTASSIUM CHLORIDE 750 MG/1
10 TABLET, FILM COATED, EXTENDED RELEASE ORAL DAILY
Qty: 30 TABLET | Refills: 0 | COMMUNITY
Start: 2023-03-07

## 2023-03-07 RX ORDER — HYDROCHLOROTHIAZIDE 50 MG/1
25 TABLET ORAL DAILY
COMMUNITY
Start: 2023-03-07

## 2023-03-08 ENCOUNTER — LAB ENCOUNTER (OUTPATIENT)
Dept: LAB | Facility: HOSPITAL | Age: 73
End: 2023-03-08
Attending: FAMILY MEDICINE
Payer: MEDICARE

## 2023-03-08 DIAGNOSIS — R19.7 DIARRHEA, UNSPECIFIED TYPE: ICD-10-CM

## 2023-03-08 DIAGNOSIS — E87.6 HYPOKALEMIA: ICD-10-CM

## 2023-03-08 LAB
ANION GAP SERPL CALC-SCNC: 4 MMOL/L (ref 0–18)
BUN BLD-MCNC: 20 MG/DL (ref 7–18)
CALCIUM BLD-MCNC: 10.2 MG/DL (ref 8.5–10.1)
CHLORIDE SERPL-SCNC: 104 MMOL/L (ref 98–112)
CO2 SERPL-SCNC: 30 MMOL/L (ref 21–32)
CREAT BLD-MCNC: 1.19 MG/DL
FASTING STATUS PATIENT QL REPORTED: YES
GFR SERPLBLD BASED ON 1.73 SQ M-ARVRAT: 49 ML/MIN/1.73M2 (ref 60–?)
GLUCOSE BLD-MCNC: 112 MG/DL (ref 70–99)
OSMOLALITY SERPL CALC.SUM OF ELEC: 289 MOSM/KG (ref 275–295)
POTASSIUM SERPL-SCNC: 3.1 MMOL/L (ref 3.5–5.1)
SODIUM SERPL-SCNC: 138 MMOL/L (ref 136–145)

## 2023-03-08 PROCEDURE — 36415 COLL VENOUS BLD VENIPUNCTURE: CPT

## 2023-03-08 PROCEDURE — 80048 BASIC METABOLIC PNL TOTAL CA: CPT

## 2023-03-08 PROCEDURE — 86671 FUNGUS NES ANTIBODY: CPT

## 2023-03-08 PROCEDURE — 86255 FLUORESCENT ANTIBODY SCREEN: CPT

## 2023-03-11 LAB
S. CEREVISIAE ANTIBODY, IGA: 16.5 UNITS
S. CEREVISIAE ANTIBODY, IGG: 81.3 UNITS

## 2023-03-17 ENCOUNTER — LAB ENCOUNTER (OUTPATIENT)
Dept: LAB | Facility: HOSPITAL | Age: 73
End: 2023-03-17
Attending: FAMILY MEDICINE
Payer: MEDICARE

## 2023-03-17 DIAGNOSIS — E87.6 HYPOKALEMIA: ICD-10-CM

## 2023-03-17 LAB
ANION GAP SERPL CALC-SCNC: 4 MMOL/L (ref 0–18)
BUN BLD-MCNC: 26 MG/DL (ref 7–18)
CALCIUM BLD-MCNC: 10.5 MG/DL (ref 8.5–10.1)
CHLORIDE SERPL-SCNC: 105 MMOL/L (ref 98–112)
CO2 SERPL-SCNC: 30 MMOL/L (ref 21–32)
CREAT BLD-MCNC: 1.17 MG/DL
FASTING STATUS PATIENT QL REPORTED: YES
GFR SERPLBLD BASED ON 1.73 SQ M-ARVRAT: 50 ML/MIN/1.73M2 (ref 60–?)
GLUCOSE BLD-MCNC: 98 MG/DL (ref 70–99)
OSMOLALITY SERPL CALC.SUM OF ELEC: 293 MOSM/KG (ref 275–295)
POTASSIUM SERPL-SCNC: 3.3 MMOL/L (ref 3.5–5.1)
SODIUM SERPL-SCNC: 139 MMOL/L (ref 136–145)

## 2023-03-17 PROCEDURE — 36415 COLL VENOUS BLD VENIPUNCTURE: CPT

## 2023-03-17 PROCEDURE — 80048 BASIC METABOLIC PNL TOTAL CA: CPT

## 2023-03-17 RX ORDER — POTASSIUM CHLORIDE 1500 MG/1
20 TABLET, FILM COATED, EXTENDED RELEASE ORAL DAILY
Qty: 30 TABLET | Refills: 0 | COMMUNITY
Start: 2023-03-17

## 2023-03-21 ENCOUNTER — TELEPHONE (OUTPATIENT)
Dept: FAMILY MEDICINE CLINIC | Facility: CLINIC | Age: 73
End: 2023-03-21

## 2023-03-21 DIAGNOSIS — I10 BENIGN ESSENTIAL HTN: Primary | ICD-10-CM

## 2023-03-21 RX ORDER — POTASSIUM CHLORIDE 1500 MG/1
20 TABLET, FILM COATED, EXTENDED RELEASE ORAL DAILY
Qty: 30 TABLET | Refills: 0 | Status: SHIPPED | OUTPATIENT
Start: 2023-03-21

## 2023-03-21 NOTE — TELEPHONE ENCOUNTER
S/w Bruno Fong    Informed the pt to stop her hydrochlorothiazide and check her blood pressures daily. I advised to forward her blood pressures for 7 days to our office. Informed the pt to come for BMP in 7-10 days. Pt agreed to plan and voiced understanding.

## 2023-03-21 NOTE — TELEPHONE ENCOUNTER
Have her stop her hydrochlorothiazide altogether and check her blood pressures daily for the next 7 days and forward me those results.   Continue with the potassium chloride 20 mill equivalents and recheck a BMP in 7 to 10 days

## 2023-03-21 NOTE — TELEPHONE ENCOUNTER
Live call from pt  sts she was supposed to have an appt with you today but it was rescheduled. Asking about her K+    You recently increased her to 20meq daily 3.17    Needs a refill as she was on 10 meq. I have sent for her but she is questioning when to repeat a bmp or how long she should be taking?     Seeing you 4.11    Please advise thanks

## 2023-03-27 DIAGNOSIS — I10 BENIGN ESSENTIAL HTN: ICD-10-CM

## 2023-03-28 RX ORDER — HYDROCHLOROTHIAZIDE 50 MG/1
TABLET ORAL
Qty: 90 TABLET | Refills: 0 | Status: SHIPPED | OUTPATIENT
Start: 2023-03-28

## 2023-03-29 ENCOUNTER — PATIENT MESSAGE (OUTPATIENT)
Dept: FAMILY MEDICINE CLINIC | Facility: CLINIC | Age: 73
End: 2023-03-29

## 2023-03-29 ENCOUNTER — LAB ENCOUNTER (OUTPATIENT)
Dept: LAB | Facility: HOSPITAL | Age: 73
End: 2023-03-29
Attending: FAMILY MEDICINE
Payer: MEDICARE

## 2023-03-29 DIAGNOSIS — E87.6 HYPOKALEMIA: Primary | ICD-10-CM

## 2023-03-29 DIAGNOSIS — I10 BENIGN ESSENTIAL HTN: ICD-10-CM

## 2023-03-29 LAB
ANION GAP SERPL CALC-SCNC: 3 MMOL/L (ref 0–18)
BUN BLD-MCNC: 19 MG/DL (ref 7–18)
CALCIUM BLD-MCNC: 10.5 MG/DL (ref 8.5–10.1)
CHLORIDE SERPL-SCNC: 107 MMOL/L (ref 98–112)
CO2 SERPL-SCNC: 29 MMOL/L (ref 21–32)
CREAT BLD-MCNC: 1.18 MG/DL
FASTING STATUS PATIENT QL REPORTED: YES
GFR SERPLBLD BASED ON 1.73 SQ M-ARVRAT: 49 ML/MIN/1.73M2 (ref 60–?)
GLUCOSE BLD-MCNC: 102 MG/DL (ref 70–99)
OSMOLALITY SERPL CALC.SUM OF ELEC: 290 MOSM/KG (ref 275–295)
POTASSIUM SERPL-SCNC: 4.1 MMOL/L (ref 3.5–5.1)
SODIUM SERPL-SCNC: 139 MMOL/L (ref 136–145)

## 2023-03-29 PROCEDURE — 80048 BASIC METABOLIC PNL TOTAL CA: CPT

## 2023-03-29 PROCEDURE — 36415 COLL VENOUS BLD VENIPUNCTURE: CPT

## 2023-03-29 NOTE — TELEPHONE ENCOUNTER
S/w Luz Maria Pisano I informed the pt Dr. Saintclair Magic lab and blood pressure interpretations with his recommendations. Pt is also inquiring if should continue to hold hydrochlorothiazide. Pt had been directed by Dr. Scarlett Bruce to hold this medication prior to lab work. Dr. Wagner Bears note: \"Have her stop her hydrochlorothiazide altogether and check her blood pressures daily for the next 7 days and forward me those results. Continue with the potassium chloride 20 mill equivalents and recheck a BMP in 7 to 10 days \"      Please advise.

## 2023-03-29 NOTE — TELEPHONE ENCOUNTER
Creatinine is only mildly elevated - just avoid nsaids and continue good hydration. BP readings appear stable. Continue with current medications and f/u with Dr. Mala Jett as he instructed.

## 2023-03-29 NOTE — TELEPHONE ENCOUNTER
S/w Wade Hinds      I advised the pt per Dr. Vanessa Stringer recommendation since her blood pressure is under good control and potassium is controlled she can continue to stay off the hydrochlorothiazide and stop the potassium chloride. I will send this information to Dr. Sandra Webber who will determine if there needs to be any changes with her medications. Pt voiced understanding. Please review and further advise if should continue to stay off of hydrochlorothiazide and potassium chloride.

## 2023-03-29 NOTE — TELEPHONE ENCOUNTER
It appears that her blood pressure is still fairly controlled without hydrochlorothiazide. He had her continue potassium chloride because her potassium levels were still low. If she continues to not take hydrochlorothiazide at his recommendation, she can stop taking potassium chloride at this point as her potassium levels have normalized. They were likely low as a result of the medication.

## 2023-04-10 ENCOUNTER — LAB ENCOUNTER (OUTPATIENT)
Dept: LAB | Facility: HOSPITAL | Age: 73
End: 2023-04-10
Attending: FAMILY MEDICINE
Payer: MEDICARE

## 2023-04-10 DIAGNOSIS — E78.00 HYPERCHOLESTEREMIA: ICD-10-CM

## 2023-04-10 DIAGNOSIS — E87.6 HYPOKALEMIA: ICD-10-CM

## 2023-04-10 LAB
ALBUMIN SERPL-MCNC: 3.3 G/DL (ref 3.4–5)
ALBUMIN/GLOB SERPL: 0.9 {RATIO} (ref 1–2)
ALP LIVER SERPL-CCNC: 64 U/L
ALT SERPL-CCNC: 30 U/L
ANION GAP SERPL CALC-SCNC: 5 MMOL/L (ref 0–18)
AST SERPL-CCNC: 25 U/L (ref 15–37)
BILIRUB SERPL-MCNC: 0.2 MG/DL (ref 0.1–2)
BUN BLD-MCNC: 13 MG/DL (ref 7–18)
CALCIUM BLD-MCNC: 8.9 MG/DL (ref 8.5–10.1)
CHLORIDE SERPL-SCNC: 109 MMOL/L (ref 98–112)
CHOLEST SERPL-MCNC: 135 MG/DL (ref ?–200)
CO2 SERPL-SCNC: 28 MMOL/L (ref 21–32)
CREAT BLD-MCNC: 1.09 MG/DL
FASTING PATIENT LIPID ANSWER: YES
FASTING STATUS PATIENT QL REPORTED: YES
GFR SERPLBLD BASED ON 1.73 SQ M-ARVRAT: 54 ML/MIN/1.73M2 (ref 60–?)
GLOBULIN PLAS-MCNC: 3.6 G/DL (ref 2.8–4.4)
GLUCOSE BLD-MCNC: 106 MG/DL (ref 70–99)
HDLC SERPL-MCNC: 60 MG/DL (ref 40–59)
LDLC SERPL CALC-MCNC: 52 MG/DL (ref ?–100)
NONHDLC SERPL-MCNC: 75 MG/DL (ref ?–130)
OSMOLALITY SERPL CALC.SUM OF ELEC: 295 MOSM/KG (ref 275–295)
POTASSIUM SERPL-SCNC: 3.5 MMOL/L (ref 3.5–5.1)
PROT SERPL-MCNC: 6.9 G/DL (ref 6.4–8.2)
SODIUM SERPL-SCNC: 142 MMOL/L (ref 136–145)
TRIGL SERPL-MCNC: 136 MG/DL (ref 30–149)
VLDLC SERPL CALC-MCNC: 20 MG/DL (ref 0–30)

## 2023-04-10 PROCEDURE — 36415 COLL VENOUS BLD VENIPUNCTURE: CPT

## 2023-04-10 PROCEDURE — 80061 LIPID PANEL: CPT

## 2023-04-10 PROCEDURE — 80053 COMPREHEN METABOLIC PANEL: CPT

## 2023-04-11 ENCOUNTER — PATIENT MESSAGE (OUTPATIENT)
Dept: FAMILY MEDICINE CLINIC | Facility: CLINIC | Age: 73
End: 2023-04-11

## 2023-04-11 ENCOUNTER — OFFICE VISIT (OUTPATIENT)
Dept: FAMILY MEDICINE CLINIC | Facility: CLINIC | Age: 73
End: 2023-04-11
Payer: MEDICARE

## 2023-04-11 VITALS
SYSTOLIC BLOOD PRESSURE: 138 MMHG | HEART RATE: 60 BPM | BODY MASS INDEX: 32.1 KG/M2 | TEMPERATURE: 97 F | DIASTOLIC BLOOD PRESSURE: 80 MMHG | WEIGHT: 188 LBS | RESPIRATION RATE: 14 BRPM | HEIGHT: 64 IN

## 2023-04-11 DIAGNOSIS — E78.00 HYPERCHOLESTEREMIA: ICD-10-CM

## 2023-04-11 DIAGNOSIS — N39.3 SUI (STRESS URINARY INCONTINENCE, FEMALE): ICD-10-CM

## 2023-04-11 DIAGNOSIS — F41.9 ANXIETY: ICD-10-CM

## 2023-04-11 DIAGNOSIS — G47.33 OSA ON CPAP: ICD-10-CM

## 2023-04-11 DIAGNOSIS — I10 BENIGN ESSENTIAL HTN: Primary | ICD-10-CM

## 2023-04-11 DIAGNOSIS — Z99.89 OSA ON CPAP: ICD-10-CM

## 2023-04-11 DIAGNOSIS — K21.9 GASTROESOPHAGEAL REFLUX DISEASE WITHOUT ESOPHAGITIS: ICD-10-CM

## 2023-04-11 PROCEDURE — 3008F BODY MASS INDEX DOCD: CPT | Performed by: FAMILY MEDICINE

## 2023-04-11 PROCEDURE — 3079F DIAST BP 80-89 MM HG: CPT | Performed by: FAMILY MEDICINE

## 2023-04-11 PROCEDURE — 99214 OFFICE O/P EST MOD 30 MIN: CPT | Performed by: FAMILY MEDICINE

## 2023-04-11 PROCEDURE — 3075F SYST BP GE 130 - 139MM HG: CPT | Performed by: FAMILY MEDICINE

## 2023-04-11 RX ORDER — POTASSIUM CHLORIDE 20 MEQ/1
TABLET, EXTENDED RELEASE ORAL
COMMUNITY
Start: 2023-03-02

## 2023-04-11 NOTE — TELEPHONE ENCOUNTER
From: Julio Valerio  To: Joy Sutherland MD  Sent: 4/11/2023 12:41 PM CDT  Subject: Exercise     I forget to ask Dr Saturnino Carrero if I can resume my water exercise now.

## 2023-04-17 ENCOUNTER — LAB ENCOUNTER (OUTPATIENT)
Dept: LAB | Facility: HOSPITAL | Age: 73
End: 2023-04-17
Attending: FAMILY MEDICINE
Payer: MEDICARE

## 2023-04-17 DIAGNOSIS — I10 BENIGN ESSENTIAL HTN: ICD-10-CM

## 2023-04-17 LAB
ANION GAP SERPL CALC-SCNC: 3 MMOL/L (ref 0–18)
BUN BLD-MCNC: 18 MG/DL (ref 7–18)
CALCIUM BLD-MCNC: 9.7 MG/DL (ref 8.5–10.1)
CHLORIDE SERPL-SCNC: 107 MMOL/L (ref 98–112)
CO2 SERPL-SCNC: 30 MMOL/L (ref 21–32)
CREAT BLD-MCNC: 1.1 MG/DL
FASTING STATUS PATIENT QL REPORTED: YES
GFR SERPLBLD BASED ON 1.73 SQ M-ARVRAT: 54 ML/MIN/1.73M2 (ref 60–?)
GLUCOSE BLD-MCNC: 100 MG/DL (ref 70–99)
OSMOLALITY SERPL CALC.SUM OF ELEC: 292 MOSM/KG (ref 275–295)
POTASSIUM SERPL-SCNC: 3.7 MMOL/L (ref 3.5–5.1)
SODIUM SERPL-SCNC: 140 MMOL/L (ref 136–145)

## 2023-04-17 PROCEDURE — 36415 COLL VENOUS BLD VENIPUNCTURE: CPT

## 2023-04-17 PROCEDURE — 80048 BASIC METABOLIC PNL TOTAL CA: CPT

## 2023-05-05 RX ORDER — ROSUVASTATIN CALCIUM 20 MG/1
TABLET, COATED ORAL
Qty: 90 TABLET | Refills: 0 | Status: SHIPPED | OUTPATIENT
Start: 2023-05-05

## 2023-05-22 DIAGNOSIS — F32.A DEPRESSION, UNSPECIFIED DEPRESSION TYPE: ICD-10-CM

## 2023-05-22 RX ORDER — SERTRALINE HYDROCHLORIDE 25 MG/1
25 TABLET, FILM COATED ORAL DAILY
Qty: 90 TABLET | Refills: 0 | Status: SHIPPED | OUTPATIENT
Start: 2023-05-22

## 2023-06-08 ENCOUNTER — TELEPHONE (OUTPATIENT)
Dept: FAMILY MEDICINE CLINIC | Facility: CLINIC | Age: 73
End: 2023-06-08

## 2023-06-08 RX ORDER — HYDRALAZINE HYDROCHLORIDE 50 MG/1
50 TABLET, FILM COATED ORAL 2 TIMES DAILY
Qty: 180 TABLET | Refills: 0 | Status: SHIPPED | OUTPATIENT
Start: 2023-06-08

## 2023-07-06 DIAGNOSIS — Z78.0 POSTMENOPAUSAL: ICD-10-CM

## 2023-07-06 DIAGNOSIS — N95.1 FLUSHING, MENOPAUSAL: ICD-10-CM

## 2023-07-06 RX ORDER — ESTRADIOL 1 MG/1
1 TABLET ORAL DAILY
Qty: 90 TABLET | Refills: 0 | Status: SHIPPED | OUTPATIENT
Start: 2023-07-06

## 2023-08-11 RX ORDER — ROSUVASTATIN CALCIUM 20 MG/1
20 TABLET, COATED ORAL NIGHTLY
Qty: 90 TABLET | Refills: 0 | Status: SHIPPED | OUTPATIENT
Start: 2023-08-11

## 2023-08-11 NOTE — TELEPHONE ENCOUNTER
Received refill request from [facility]: 04/11/2023    Last Refill:   Medication Quantity Refills Start End   ROSUVASTATIN 20 MG Oral Tab 90 tablet 0 5/5/2023      RTC: leigh ann carbajal 08/15/2023    Protocol: passed

## 2023-08-15 ENCOUNTER — OFFICE VISIT (OUTPATIENT)
Dept: FAMILY MEDICINE CLINIC | Facility: CLINIC | Age: 73
End: 2023-08-15
Payer: MEDICARE

## 2023-08-15 VITALS
RESPIRATION RATE: 16 BRPM | WEIGHT: 183 LBS | HEART RATE: 72 BPM | TEMPERATURE: 99 F | SYSTOLIC BLOOD PRESSURE: 160 MMHG | DIASTOLIC BLOOD PRESSURE: 90 MMHG | BODY MASS INDEX: 31.24 KG/M2 | HEIGHT: 64 IN

## 2023-08-15 DIAGNOSIS — E78.00 HYPERCHOLESTEREMIA: ICD-10-CM

## 2023-08-15 DIAGNOSIS — R42 EPISODIC LIGHTHEADEDNESS: ICD-10-CM

## 2023-08-15 DIAGNOSIS — I70.1 RENAL ARTERY STENOSIS (HCC): ICD-10-CM

## 2023-08-15 DIAGNOSIS — K21.9 GASTROESOPHAGEAL REFLUX DISEASE WITHOUT ESOPHAGITIS: ICD-10-CM

## 2023-08-15 DIAGNOSIS — I15.9 SECONDARY HYPERTENSION: ICD-10-CM

## 2023-08-15 DIAGNOSIS — Z00.00 ENCOUNTER FOR ANNUAL HEALTH EXAMINATION: Primary | ICD-10-CM

## 2023-08-15 DIAGNOSIS — F32.A DEPRESSION, UNSPECIFIED DEPRESSION TYPE: ICD-10-CM

## 2023-08-15 DIAGNOSIS — G47.33 OSA ON CPAP: ICD-10-CM

## 2023-08-15 DIAGNOSIS — Z12.31 ENCOUNTER FOR SCREENING MAMMOGRAM FOR MALIGNANT NEOPLASM OF BREAST: ICD-10-CM

## 2023-08-16 ENCOUNTER — TELEPHONE (OUTPATIENT)
Dept: FAMILY MEDICINE CLINIC | Facility: CLINIC | Age: 73
End: 2023-08-16

## 2023-08-16 NOTE — TELEPHONE ENCOUNTER
S/W pt and she wanted to know how soon Dr. Marisa Washington recommends she does her Holter Monitor he ordered. Informed pt that no specific time noted in the his note from yesterday's message. Advised her to go ahead and set it up, but to make sure to monitor her B/P. Also reminded pt to follow up again in 4-6 weeks. Pt voiced understanding and agreed with plan.

## 2023-08-21 ENCOUNTER — HOSPITAL ENCOUNTER (OUTPATIENT)
Dept: CV DIAGNOSTICS | Facility: HOSPITAL | Age: 73
Discharge: HOME | End: 2023-08-21
Attending: FAMILY MEDICINE
Payer: MEDICARE

## 2023-08-21 DIAGNOSIS — R42 EPISODIC LIGHTHEADEDNESS: ICD-10-CM

## 2023-08-21 PROCEDURE — 93247 EXT ECG>7D<15D SCAN A/R: CPT | Performed by: FAMILY MEDICINE

## 2023-08-21 PROCEDURE — 93246 EXT ECG>7D<15D RECORDING: CPT | Performed by: FAMILY MEDICINE

## 2023-08-28 DIAGNOSIS — F32.A DEPRESSION, UNSPECIFIED DEPRESSION TYPE: ICD-10-CM

## 2023-08-28 RX ORDER — SERTRALINE HYDROCHLORIDE 25 MG/1
25 TABLET, FILM COATED ORAL DAILY
Qty: 90 TABLET | Refills: 1 | Status: SHIPPED | OUTPATIENT
Start: 2023-08-28

## 2023-09-12 ENCOUNTER — LAB ENCOUNTER (OUTPATIENT)
Dept: LAB | Facility: HOSPITAL | Age: 73
End: 2023-09-12
Attending: FAMILY MEDICINE
Payer: MEDICARE

## 2023-09-12 DIAGNOSIS — R00.1 SEVERE SINUS BRADYCARDIA: Primary | ICD-10-CM

## 2023-09-12 DIAGNOSIS — I10 ESSENTIAL HYPERTENSION, MALIGNANT: ICD-10-CM

## 2023-09-12 LAB
ANION GAP SERPL CALC-SCNC: 4 MMOL/L (ref 0–18)
BUN BLD-MCNC: 25 MG/DL (ref 7–18)
CALCIUM BLD-MCNC: 9.4 MG/DL (ref 8.5–10.1)
CHLORIDE SERPL-SCNC: 105 MMOL/L (ref 98–112)
CO2 SERPL-SCNC: 31 MMOL/L (ref 21–32)
CREAT BLD-MCNC: 1.31 MG/DL
EGFRCR SERPLBLD CKD-EPI 2021: 44 ML/MIN/1.73M2 (ref 60–?)
FASTING STATUS PATIENT QL REPORTED: YES
GLUCOSE BLD-MCNC: 106 MG/DL (ref 70–99)
MAGNESIUM SERPL-MCNC: 1.7 MG/DL (ref 1.6–2.6)
OSMOLALITY SERPL CALC.SUM OF ELEC: 295 MOSM/KG (ref 275–295)
POTASSIUM SERPL-SCNC: 3.8 MMOL/L (ref 3.5–5.1)
SODIUM SERPL-SCNC: 140 MMOL/L (ref 136–145)

## 2023-09-12 PROCEDURE — 83735 ASSAY OF MAGNESIUM: CPT

## 2023-09-12 PROCEDURE — 80048 BASIC METABOLIC PNL TOTAL CA: CPT

## 2023-09-12 PROCEDURE — 36415 COLL VENOUS BLD VENIPUNCTURE: CPT

## 2023-09-18 ENCOUNTER — OFFICE VISIT (OUTPATIENT)
Dept: FAMILY MEDICINE CLINIC | Facility: CLINIC | Age: 73
End: 2023-09-18
Payer: MEDICARE

## 2023-09-18 VITALS
SYSTOLIC BLOOD PRESSURE: 150 MMHG | BODY MASS INDEX: 31.41 KG/M2 | RESPIRATION RATE: 14 BRPM | HEIGHT: 64 IN | DIASTOLIC BLOOD PRESSURE: 92 MMHG | TEMPERATURE: 97 F | WEIGHT: 184 LBS | HEART RATE: 66 BPM

## 2023-09-18 DIAGNOSIS — I47.29 NSVT (NONSUSTAINED VENTRICULAR TACHYCARDIA) (HCC): ICD-10-CM

## 2023-09-18 DIAGNOSIS — N18.32 STAGE 3B CHRONIC KIDNEY DISEASE (HCC): ICD-10-CM

## 2023-09-18 DIAGNOSIS — I77.3 FIBROMUSCULAR DYSPLASIA OF RIGHT RENAL ARTERY (HCC): ICD-10-CM

## 2023-09-18 DIAGNOSIS — I10 PRIMARY HYPERTENSION: Primary | ICD-10-CM

## 2023-09-18 DIAGNOSIS — R42 EPISODIC LIGHTHEADEDNESS: ICD-10-CM

## 2023-09-18 DIAGNOSIS — R00.1 SINUS BRADYCARDIA: ICD-10-CM

## 2023-09-18 DIAGNOSIS — R73.9 ELEVATED BLOOD SUGAR: ICD-10-CM

## 2023-09-18 DIAGNOSIS — I15.9 SECONDARY HYPERTENSION: ICD-10-CM

## 2023-09-18 DIAGNOSIS — E78.00 HYPERCHOLESTEREMIA: ICD-10-CM

## 2023-09-18 PROBLEM — N18.30 CKD (CHRONIC KIDNEY DISEASE) STAGE 3, GFR 30-59 ML/MIN (HCC): Chronic | Status: ACTIVE | Noted: 2023-09-18

## 2023-09-18 PROCEDURE — 3077F SYST BP >= 140 MM HG: CPT | Performed by: STUDENT IN AN ORGANIZED HEALTH CARE EDUCATION/TRAINING PROGRAM

## 2023-09-18 PROCEDURE — 99214 OFFICE O/P EST MOD 30 MIN: CPT | Performed by: STUDENT IN AN ORGANIZED HEALTH CARE EDUCATION/TRAINING PROGRAM

## 2023-09-18 PROCEDURE — 3080F DIAST BP >= 90 MM HG: CPT | Performed by: STUDENT IN AN ORGANIZED HEALTH CARE EDUCATION/TRAINING PROGRAM

## 2023-09-18 PROCEDURE — 1159F MED LIST DOCD IN RCRD: CPT | Performed by: STUDENT IN AN ORGANIZED HEALTH CARE EDUCATION/TRAINING PROGRAM

## 2023-09-18 PROCEDURE — 1160F RVW MEDS BY RX/DR IN RCRD: CPT | Performed by: STUDENT IN AN ORGANIZED HEALTH CARE EDUCATION/TRAINING PROGRAM

## 2023-09-18 PROCEDURE — 1170F FXNL STATUS ASSESSED: CPT | Performed by: STUDENT IN AN ORGANIZED HEALTH CARE EDUCATION/TRAINING PROGRAM

## 2023-09-18 PROCEDURE — 3008F BODY MASS INDEX DOCD: CPT | Performed by: STUDENT IN AN ORGANIZED HEALTH CARE EDUCATION/TRAINING PROGRAM

## 2023-09-18 RX ORDER — HYDROCHLOROTHIAZIDE 25 MG/1
25 TABLET ORAL DAILY
COMMUNITY
Start: 2023-08-29

## 2023-09-25 ENCOUNTER — MED REC SCAN ONLY (OUTPATIENT)
Dept: FAMILY MEDICINE CLINIC | Facility: CLINIC | Age: 73
End: 2023-09-25

## 2023-10-04 DIAGNOSIS — N95.1 FLUSHING, MENOPAUSAL: ICD-10-CM

## 2023-10-04 DIAGNOSIS — Z78.0 POSTMENOPAUSAL: ICD-10-CM

## 2023-10-04 RX ORDER — ESTRADIOL 1 MG/1
1 TABLET ORAL DAILY
Qty: 90 TABLET | Refills: 1 | Status: SHIPPED | OUTPATIENT
Start: 2023-10-04

## 2023-10-04 NOTE — TELEPHONE ENCOUNTER
Pt requesting c/b from nurse to discuss delay in refill request.    States pharmacy submitted rx request Mon/Tues/and today w/ no response. Pt requesting ASAP refill of estradiol 1 MG Oral Tab to   [address], [phone], [phone]     Please contact pt and process rx if appropriate, thank you.

## 2023-10-04 NOTE — TELEPHONE ENCOUNTER
Please see pended RX and approve if appropriate. LOV:  9/18/2023 for medication follow up. LRF:  7/6/2023    Medication Quantity Refills Start End   estradiol 1 MG Oral Tab 90 tablet 0 7/6/2023    Sig:   Take 1 tablet (1 mg total) by mouth daily.      Route:   Oral

## 2023-10-16 ENCOUNTER — PATIENT MESSAGE (OUTPATIENT)
Dept: FAMILY MEDICINE CLINIC | Facility: CLINIC | Age: 73
End: 2023-10-16

## 2023-10-16 NOTE — TELEPHONE ENCOUNTER
From: Bao Valerio  To: Reymundo Whitney  Sent: 10/16/2023 12:20 PM CDT  Subject: Order    Hi please if you have not done it yet I need an order to schedule a Mammogram for this month. Thank you.

## 2023-10-19 ENCOUNTER — LAB ENCOUNTER (OUTPATIENT)
Dept: LAB | Age: 73
End: 2023-10-19
Attending: STUDENT IN AN ORGANIZED HEALTH CARE EDUCATION/TRAINING PROGRAM
Payer: MEDICARE

## 2023-10-19 DIAGNOSIS — E78.00 HYPERCHOLESTEREMIA: ICD-10-CM

## 2023-10-19 DIAGNOSIS — R73.9 ELEVATED BLOOD SUGAR: ICD-10-CM

## 2023-10-19 DIAGNOSIS — I10 PRIMARY HYPERTENSION: ICD-10-CM

## 2023-10-19 LAB
ALBUMIN SERPL-MCNC: 3.5 G/DL (ref 3.4–5)
ALBUMIN/GLOB SERPL: 0.9 {RATIO} (ref 1–2)
ALP LIVER SERPL-CCNC: 61 U/L
ALT SERPL-CCNC: 29 U/L
ANION GAP SERPL CALC-SCNC: 3 MMOL/L (ref 0–18)
AST SERPL-CCNC: 25 U/L (ref 15–37)
BASOPHILS # BLD AUTO: 0.05 X10(3) UL (ref 0–0.2)
BASOPHILS NFR BLD AUTO: 1.2 %
BILIRUB SERPL-MCNC: 0.3 MG/DL (ref 0.1–2)
BILIRUB UR QL STRIP.AUTO: NEGATIVE
BUN BLD-MCNC: 20 MG/DL (ref 7–18)
CALCIUM BLD-MCNC: 9.4 MG/DL (ref 8.5–10.1)
CHLORIDE SERPL-SCNC: 103 MMOL/L (ref 98–112)
CHOLEST SERPL-MCNC: 168 MG/DL (ref ?–200)
CLARITY UR REFRACT.AUTO: CLEAR
CO2 SERPL-SCNC: 29 MMOL/L (ref 21–32)
CREAT BLD-MCNC: 1.16 MG/DL
EGFRCR SERPLBLD CKD-EPI 2021: 50 ML/MIN/1.73M2 (ref 60–?)
EOSINOPHIL # BLD AUTO: 0.13 X10(3) UL (ref 0–0.7)
EOSINOPHIL NFR BLD AUTO: 3 %
ERYTHROCYTE [DISTWIDTH] IN BLOOD BY AUTOMATED COUNT: 14.6 %
EST. AVERAGE GLUCOSE BLD GHB EST-MCNC: 114 MG/DL (ref 68–126)
FASTING PATIENT LIPID ANSWER: YES
FASTING STATUS PATIENT QL REPORTED: YES
GLOBULIN PLAS-MCNC: 3.8 G/DL (ref 2.8–4.4)
GLUCOSE BLD-MCNC: 102 MG/DL (ref 70–99)
GLUCOSE UR STRIP.AUTO-MCNC: NORMAL MG/DL
HBA1C MFR BLD: 5.6 % (ref ?–5.7)
HCT VFR BLD AUTO: 37.9 %
HDLC SERPL-MCNC: 81 MG/DL (ref 40–59)
HGB BLD-MCNC: 12 G/DL
IMM GRANULOCYTES # BLD AUTO: 0 X10(3) UL (ref 0–1)
IMM GRANULOCYTES NFR BLD: 0 %
KETONES UR STRIP.AUTO-MCNC: NEGATIVE MG/DL
LDLC SERPL CALC-MCNC: 68 MG/DL (ref ?–100)
LEUKOCYTE ESTERASE UR QL STRIP.AUTO: NEGATIVE
LYMPHOCYTES # BLD AUTO: 1.36 X10(3) UL (ref 1–4)
LYMPHOCYTES NFR BLD AUTO: 31.6 %
MCH RBC QN AUTO: 28.8 PG (ref 26–34)
MCHC RBC AUTO-ENTMCNC: 31.7 G/DL (ref 31–37)
MCV RBC AUTO: 90.9 FL
MONOCYTES # BLD AUTO: 0.57 X10(3) UL (ref 0.1–1)
MONOCYTES NFR BLD AUTO: 13.3 %
NEUTROPHILS # BLD AUTO: 2.19 X10 (3) UL (ref 1.5–7.7)
NEUTROPHILS # BLD AUTO: 2.19 X10(3) UL (ref 1.5–7.7)
NEUTROPHILS NFR BLD AUTO: 50.9 %
NITRITE UR QL STRIP.AUTO: NEGATIVE
NONHDLC SERPL-MCNC: 87 MG/DL (ref ?–130)
OSMOLALITY SERPL CALC.SUM OF ELEC: 283 MOSM/KG (ref 275–295)
PH UR STRIP.AUTO: 7 [PH] (ref 5–8)
PLATELET # BLD AUTO: 236 10(3)UL (ref 150–450)
POTASSIUM SERPL-SCNC: 4 MMOL/L (ref 3.5–5.1)
PROT SERPL-MCNC: 7.3 G/DL (ref 6.4–8.2)
PROT UR STRIP.AUTO-MCNC: NEGATIVE MG/DL
RBC # BLD AUTO: 4.17 X10(6)UL
RBC UR QL AUTO: NEGATIVE
SODIUM SERPL-SCNC: 135 MMOL/L (ref 136–145)
SP GR UR STRIP.AUTO: 1.01 (ref 1–1.03)
TRIGL SERPL-MCNC: 106 MG/DL (ref 30–149)
TSI SER-ACNC: 2.9 MIU/ML (ref 0.36–3.74)
UROBILINOGEN UR STRIP.AUTO-MCNC: NORMAL MG/DL
VLDLC SERPL CALC-MCNC: 16 MG/DL (ref 0–30)
WBC # BLD AUTO: 4.3 X10(3) UL (ref 4–11)

## 2023-10-19 PROCEDURE — 80053 COMPREHEN METABOLIC PANEL: CPT

## 2023-10-19 PROCEDURE — 80061 LIPID PANEL: CPT

## 2023-10-19 PROCEDURE — 85025 COMPLETE CBC W/AUTO DIFF WBC: CPT

## 2023-10-19 PROCEDURE — 81003 URINALYSIS AUTO W/O SCOPE: CPT

## 2023-10-19 PROCEDURE — 84443 ASSAY THYROID STIM HORMONE: CPT

## 2023-10-19 PROCEDURE — 83036 HEMOGLOBIN GLYCOSYLATED A1C: CPT

## 2023-10-19 PROCEDURE — 36415 COLL VENOUS BLD VENIPUNCTURE: CPT

## 2023-11-02 ENCOUNTER — OFFICE VISIT (OUTPATIENT)
Dept: NEPHROLOGY | Facility: CLINIC | Age: 73
End: 2023-11-02
Payer: MEDICARE

## 2023-11-02 VITALS — DIASTOLIC BLOOD PRESSURE: 90 MMHG | SYSTOLIC BLOOD PRESSURE: 172 MMHG | WEIGHT: 189.25 LBS | BODY MASS INDEX: 32 KG/M2

## 2023-11-02 DIAGNOSIS — I10 HYPERTENSION, UNSPECIFIED TYPE: Primary | ICD-10-CM

## 2023-11-02 DIAGNOSIS — N18.2 CKD (CHRONIC KIDNEY DISEASE) STAGE 2, GFR 60-89 ML/MIN: ICD-10-CM

## 2023-11-02 DIAGNOSIS — I70.1 RENAL ARTERY STENOSIS (HCC): ICD-10-CM

## 2023-11-02 RX ORDER — PREDNISONE 50 MG/1
50 TABLET ORAL AS DIRECTED
Qty: 3 TABLET | Refills: 0 | Status: SHIPPED | OUTPATIENT
Start: 2023-11-02

## 2023-11-10 NOTE — IMAGING NOTE
Left voice mail regarding 13 hour prep prior to CTA scan . Patient has iodine contrast allergy. Call back number provided.

## 2023-11-16 ENCOUNTER — HOSPITAL ENCOUNTER (OUTPATIENT)
Dept: MAMMOGRAPHY | Age: 73
Discharge: HOME | End: 2023-11-16
Attending: FAMILY MEDICINE
Payer: MEDICARE

## 2023-11-16 DIAGNOSIS — Z12.31 ENCOUNTER FOR SCREENING MAMMOGRAM FOR MALIGNANT NEOPLASM OF BREAST: ICD-10-CM

## 2023-11-16 PROCEDURE — 77067 SCR MAMMO BI INCL CAD: CPT | Performed by: FAMILY MEDICINE

## 2023-11-16 PROCEDURE — 77063 BREAST TOMOSYNTHESIS BI: CPT | Performed by: FAMILY MEDICINE

## 2023-11-17 ENCOUNTER — HOSPITAL ENCOUNTER (OUTPATIENT)
Dept: CT IMAGING | Facility: HOSPITAL | Age: 73
Discharge: HOME | End: 2023-11-17
Attending: INTERNAL MEDICINE
Payer: MEDICARE

## 2023-11-17 DIAGNOSIS — N18.2 CKD (CHRONIC KIDNEY DISEASE) STAGE 2, GFR 60-89 ML/MIN: ICD-10-CM

## 2023-11-17 DIAGNOSIS — I10 HYPERTENSION, UNSPECIFIED TYPE: ICD-10-CM

## 2023-11-17 DIAGNOSIS — I70.1 RENAL ARTERY STENOSIS (HCC): ICD-10-CM

## 2023-11-17 PROCEDURE — 74175 CTA ABDOMEN W/CONTRAST: CPT | Performed by: INTERNAL MEDICINE

## 2023-11-20 ENCOUNTER — PATIENT MESSAGE (OUTPATIENT)
Dept: NEPHROLOGY | Facility: CLINIC | Age: 73
End: 2023-11-20

## 2023-11-21 RX ORDER — AMLODIPINE BESYLATE 5 MG/1
5 TABLET ORAL DAILY
Qty: 30 TABLET | Refills: 11 | Status: SHIPPED | OUTPATIENT
Start: 2023-11-21

## 2023-11-21 NOTE — TELEPHONE ENCOUNTER
From: Luis Cordova  To: Flako Valerio  Sent: 11/20/2023 12:15 PM CST  Subject: CT scan     Teodoro-    Your CT scan looks good; there is no evidence of any narrowing of the kidney artery on either side. Please remind me, how have your recent blood pressures been? HEATHER Wheeler

## 2023-12-28 ENCOUNTER — HOSPITAL ENCOUNTER (OUTPATIENT)
Age: 73
Discharge: HOME | End: 2023-12-28
Payer: MEDICARE

## 2023-12-28 VITALS
HEART RATE: 61 BPM | TEMPERATURE: 98 F | OXYGEN SATURATION: 97 % | SYSTOLIC BLOOD PRESSURE: 167 MMHG | BODY MASS INDEX: 32 KG/M2 | DIASTOLIC BLOOD PRESSURE: 90 MMHG | RESPIRATION RATE: 20 BRPM | WEIGHT: 185 LBS

## 2023-12-28 DIAGNOSIS — B34.9 VIRAL SYNDROME: ICD-10-CM

## 2023-12-28 DIAGNOSIS — J02.9 SORE THROAT: Primary | ICD-10-CM

## 2023-12-28 LAB
POCT INFLUENZA A: NEGATIVE
POCT INFLUENZA B: NEGATIVE
S PYO AG THROAT QL: NEGATIVE
SARS-COV-2 RNA RESP QL NAA+PROBE: NOT DETECTED

## 2023-12-28 PROCEDURE — U0002 COVID-19 LAB TEST NON-CDC: HCPCS | Performed by: NURSE PRACTITIONER

## 2023-12-28 PROCEDURE — 87880 STREP A ASSAY W/OPTIC: CPT | Performed by: NURSE PRACTITIONER

## 2023-12-28 PROCEDURE — 87502 INFLUENZA DNA AMP PROBE: CPT | Performed by: NURSE PRACTITIONER

## 2023-12-28 PROCEDURE — 99213 OFFICE O/P EST LOW 20 MIN: CPT | Performed by: NURSE PRACTITIONER

## 2023-12-28 NOTE — ED INITIAL ASSESSMENT (HPI)
Pt began 12/21 with some bodyaches, and a runny nose, it improved with mucinex, and then 12/25 developed a severe sore throat and cough.

## 2023-12-28 NOTE — DISCHARGE INSTRUCTIONS
COVID, flu, strep testing are negative. Symptoms appear viral.  Take Tylenol as needed for pain. Delsym as needed for cough. Increase oral fluids. Throat lozenges. Symptoms should resolve on their own.   Follow-up with your primary doctor if no improvement

## 2024-01-04 ENCOUNTER — PATIENT MESSAGE (OUTPATIENT)
Dept: FAMILY MEDICINE CLINIC | Facility: CLINIC | Age: 74
End: 2024-01-04

## 2024-01-04 DIAGNOSIS — N95.1 FLUSHING, MENOPAUSAL: ICD-10-CM

## 2024-01-04 DIAGNOSIS — Z78.0 POSTMENOPAUSAL: ICD-10-CM

## 2024-01-04 NOTE — TELEPHONE ENCOUNTER
Last office visit: 9/18/2023   Labs last completed: 10/19/2023  Requested medication(s) are due for refill today: yes  Requested medication(s) are on the active medication list same strength, form, dose/ sig: yes  Requested medication(s) are managed by provider: yes  Patient has already received a courtsey refill: no    NOV: none

## 2024-01-04 NOTE — TELEPHONE ENCOUNTER
Estradiol 90 day with 1 refill was sent 10/2023 by Dr. Arreola- please check the status of this with patient. Please make sure her med list is updated with below.

## 2024-01-05 RX ORDER — ESTRADIOL 1 MG/1
1 TABLET ORAL DAILY
Qty: 90 TABLET | Refills: 0 | Status: SHIPPED | OUTPATIENT
Start: 2024-01-05 | End: 2024-02-16

## 2024-02-16 ENCOUNTER — OFFICE VISIT (OUTPATIENT)
Dept: FAMILY MEDICINE CLINIC | Facility: CLINIC | Age: 74
End: 2024-02-16
Payer: MEDICARE

## 2024-02-16 VITALS
HEART RATE: 72 BPM | BODY MASS INDEX: 32.67 KG/M2 | TEMPERATURE: 97 F | WEIGHT: 191.38 LBS | RESPIRATION RATE: 16 BRPM | SYSTOLIC BLOOD PRESSURE: 108 MMHG | DIASTOLIC BLOOD PRESSURE: 70 MMHG | HEIGHT: 64 IN

## 2024-02-16 DIAGNOSIS — E78.00 HYPERCHOLESTEREMIA: ICD-10-CM

## 2024-02-16 DIAGNOSIS — I10 PRIMARY HYPERTENSION: Primary | ICD-10-CM

## 2024-02-16 DIAGNOSIS — N18.32 STAGE 3B CHRONIC KIDNEY DISEASE (HCC): ICD-10-CM

## 2024-02-16 DIAGNOSIS — N95.1 FLUSHING, MENOPAUSAL: ICD-10-CM

## 2024-02-16 DIAGNOSIS — E55.9 VITAMIN D DEFICIENCY: ICD-10-CM

## 2024-02-16 DIAGNOSIS — E66.09 CLASS 1 OBESITY DUE TO EXCESS CALORIES WITH SERIOUS COMORBIDITY AND BODY MASS INDEX (BMI) OF 32.0 TO 32.9 IN ADULT: ICD-10-CM

## 2024-02-16 DIAGNOSIS — F32.A DEPRESSION, UNSPECIFIED DEPRESSION TYPE: ICD-10-CM

## 2024-02-16 DIAGNOSIS — I77.3 FIBROMUSCULAR DYSPLASIA OF RIGHT RENAL ARTERY (HCC): ICD-10-CM

## 2024-02-16 DIAGNOSIS — Z78.0 POSTMENOPAUSAL: ICD-10-CM

## 2024-02-16 RX ORDER — SERTRALINE HYDROCHLORIDE 25 MG/1
25 TABLET, FILM COATED ORAL DAILY
Qty: 90 TABLET | Refills: 1 | Status: SHIPPED | OUTPATIENT
Start: 2024-02-16

## 2024-02-16 RX ORDER — LOSARTAN POTASSIUM 50 MG/1
50 TABLET ORAL 2 TIMES DAILY
COMMUNITY
Start: 2024-01-02

## 2024-02-16 RX ORDER — ESTRADIOL 1 MG/1
0.5 TABLET ORAL DAILY
Qty: 90 TABLET | Refills: 0 | Status: SHIPPED | OUTPATIENT
Start: 2024-02-16

## 2024-02-16 NOTE — PROGRESS NOTES
[facility] Family [facility] Note  02/16/24    Chief complaint:   Chief Complaint   Patient presents with    Blood Pressure     HPI:   Kirby Valerio is a 74 year old female who presents for follow up.    Stopped hydralazine and started amlodipine. In the past was on amlidpine in the hospital in the past and had headache. Saw cardiology. Told to take losartan 50mg twice a day. Has upcoming appointment with Dr. Logan next month.    Was having episodes of fatigue. In the morning will take dog for a walk then come back and have breakfast with milk. Then would work out. Would have 1-2  cups of coffee.  Was told to drink 1-2 glasses of water in the morning. She has noticed a positive difference. Will notice if she skips.    Not missing dose of medications.     Taking vitamin D 1000iu daily. Taking 2000iu in the winter.    Patient has question about diet medications. Would like to check with insurance. Walks dog daily for 15-20 minutes or longer. Her dog is slowing down a bit. Will also exercise at [employer] 2-4 times a week. She is frustrated with her weight gain.    Taking sertraline 25mg daily. It is helping her. Hoping to wean off the estradiol.     Going to work on [employer] in April 2024.    Cholesterol Meds: rosuvastatin Tabs - 20 MG     BP Meds: amLODIPine Tabs - 5 MG; hydroCHLOROthiazide Tabs - 12.5 MG; losartan Tabs - 50 MG       Wt Readings from Last 6 Encounters:   02/16/24 191 lb 6.4 oz (86.8 kg)   12/28/23 185 lb (83.9 kg)   11/02/23 189 lb 4 oz (85.8 kg)   09/18/23 184 lb (83.5 kg)   08/15/23 183 lb (83 kg)   04/11/23 188 lb (85.3 kg)       Past Medical History:   Diagnosis Date    Pepper's esophagus     Diverticulosis of large intestine     Esophageal reflux     High blood pressure     High cholesterol     History of stomach ulcers     ISIS (obstructive sleep apnea) 04/11/18 PSG    AHI 6 Supine AHI 15 non-supine AHI 2 Sao2 Taj 73%, CPAP 9    Osteoarthritis      knees    Sleep apnea     GARRY (stress urinary incontinence, female)      Past Surgical History:   Procedure Laterality Date    COLONOSCOPY N/A 12/11/2017    Procedure: COLONOSCOPY;  Surgeon: Angelo Koch MD;  Location:  ENDOSCOPY    HERNIA SURGERY  12/29/2021    robotic paraesophageal hernia repair w/ toupet fundoplication lysis of intra-abdominal adhesions    HYSTERECTOMY  1982    total hysterectomy     LEXIE LOCALIZATION WIRE 1 SITE RIGHT (CPT=19281)  2015    OTHER      balloon dilation right renal artery    OTHER      rt hand sx    OTHER SURGICAL HISTORY  03/01/2021    Sling (Dr. Locke)     SINUS SURGERY        rhinoplasty, and turbinate    SLING OPER STRES INCONTINENCE  03/01/2021    mid urethral sling (Obtyrx) with bladder neck biopsy     Allergies   Allergen Reactions    Adhesive Tape HIVES    Aspirin BLEEDING     Caused ulcer in the stomach     Iodinated Diagnostic Agents [Radiology Contrast Iodinated Dyes] HIVES, ITCHING and NAUSEA ONLY     Tolerated MRI w contrast 1/2019 with premedication    Atorvastatin MYALGIA     Fatigue     Amlodipine OTHER (SEE COMMENTS)     Headache     Shellfish-Derived Products HIVES    Sulfa Antibiotics HIVES      losartan 50 MG Oral Tab Take 1 tablet (50 mg total) by mouth 2 (two) times daily.      MAGNESIUM OR Take by mouth.      estradiol 1 MG Oral Tab Take 0.5 tablets (0.5 mg total) by mouth daily. 90 tablet 0    sertraline 25 MG Oral Tab Take 1 tablet (25 mg total) by mouth daily. 90 tablet 1    hydroCHLOROthiazide 12.5 MG Oral Tab Take 1 tablet (12.5 mg total) by mouth daily.      amLODIPine 5 MG Oral Tab Take 1 tablet (5 mg total) by mouth daily. 30 tablet 11    rosuvastatin 20 MG Oral Tab Take 1 tablet (20 mg total) by mouth nightly. 90 tablet 1    pantoprazole 20 MG Oral Tab EC 1 tablet (20 mg total). Once daily, can take up to bid      Vitamin D3, Cholecalciferol, 25 MCG (1000 UT) Oral Tab Take 2 tablets (2,000 Units total) by mouth daily.      Multiple  Vitamins-Minerals (EYE VITAMINS OR) Take 1 tablet by mouth daily.       Social History     Socioeconomic History    Marital status:    Tobacco Use    Smoking status: Never    Smokeless tobacco: Never   Vaping Use    Vaping Use: Never used   Substance and Sexual Activity    Alcohol use: Not Currently    Drug use: No    Sexual activity: Yes   Other Topics Concern    Caffeine Concern Yes     Comment: 2 cups daily     Exercise No     Comment: ON HOLD DUE TO HERNIA SURGERY      Counseling given: Not Answered    Family History   Problem Relation Age of Onset    Heart Disorder Father         MI    Cancer Mother         Brain and lung cancer    Heart Disorder Maternal Grandmother     Heart Disorder Maternal Grandfather     Heart Disorder Paternal Grandfather         MI    Heart Disorder Brother     Breast Cancer Maternal Aunt 75     Family Status   Relation Status    Fa     Mo     MGMA (Not Specified)    MGFA     PGFA     Bro  at age 69        heart failure    Bro  at age 33        GSW    Mat Aunt (Not Specified)        REVIEW OF SYSTEMS:   See HPI    EXAM:   /70   Pulse 72   Temp 97.1 °F (36.2 °C) (Temporal)   Resp 16   Ht 5' 4\" (1.626 m)   Wt 191 lb 6.4 oz (86.8 kg)   BMI 32.85 kg/m²  Estimated body mass index is 32.85 kg/m² as calculated from the following:    Height as of this encounter: 5' 4\" (1.626 m).    Weight as of this encounter: 191 lb 6.4 oz (86.8 kg).   Vital signs reviewed. Appears stated age, well groomed.  Physical Exam:  GEN:  Patient is alert and oriented x3, no apparent distress  HEAD:  Normocephalic, atraumatic  HEENT:  no scleral icterus, conjunctivae clear bilaterally, EOMI, PERRLA, OP clear  LUNGS: clear to auscultation bilaterally, no rales/rhonchi/wheezing  HEART:  Regular rate and rhythm, normal S1/S2, no murmurs, rubs or gallops  EXTREMITIES:  Moves all extremities well  NEURO:  CN 2 - 12 grossly intact, gait normal, patellar  reflexes equal      ASSESSMENT AND PLAN:   1. Primary hypertension  Patient presents for HTN follow up. BP at goal. Continue to monitor at home. Keep upcoming appt with cardiology, appreciate evaluation and recommendations.  - losartan 50mg BID  - HCTZ 12.5mg daily   - amlodipine 5mg daily    2. Hypercholesteremia  Doing well on rosuvastatin 20mg at bedtime. Will continue.   - Lipid Panel; Future  - Comp Metabolic Panel (14); Future    3. Vitamin D deficiency  - will check level to determine supplementation recommendations as indicated  - Vitamin D [E]; Future    4. Fibromuscular dysplasia of right renal artery (HCC)  Not causing significant stenosis at this time    5. Stage 3b chronic kidney disease (HCC)  Will continue to monitor. Continue good BP control and hydration.    6. Class 1 obesity due to excess calories with serious comorbidity and body mass index (BMI) of 32.0 to 32.9 in adult  Patient is interested in GLP-1. Advised to check with insurance for coverage/preferences. Follow up three months after starting new medication.     7. Flushing, menopausal  Patient is doing well at this time, would like to try to wean off slowly  - estradiol 1 MG Oral Tab; Take 0.5 tablets (0.5 mg total) by mouth daily.  Dispense: 90 tablet; Refill: 0    8. Postmenopausal  - estradiol 1 MG Oral Tab; Take 0.5 tablets (0.5 mg total) by mouth daily.  Dispense: 90 tablet; Refill: 0    9. Depression, unspecified depression type  Stable on current dose, would like to continue at this time.   - sertraline 25 MG Oral Tab; Take 1 tablet (25 mg total) by mouth daily.  Dispense: 90 tablet; Refill: 1      Meds & Refills for this Visit:  Requested Prescriptions     Signed Prescriptions Disp Refills    estradiol 1 MG Oral Tab 90 tablet 0     Sig: Take 0.5 tablets (0.5 mg total) by mouth daily.    sertraline 25 MG Oral Tab 90 tablet 1     Sig: Take 1 tablet (25 mg total) by mouth daily.       These Medications Have Changed       Start Taking  Instead of    estradiol 1 MG Oral Tab estradiol 1 MG Oral Tab    Dosage:  Take 0.5 tablets (0.5 mg total) by mouth daily. - Oral Dosage:  Take 1 tablet (1 mg total) by mouth daily. - Oral          Stop Taking                oxybutynin ER 5 MG Oral Tablet 24 Hr    Once daily            Health Maintenance:  Health Maintenance Due   Topic Date Due    Zoster Vaccines (1 of 2) Never done    COVID-19 Vaccine (6 - 2023-24 season) 09/01/2023    HTN: BP Follow-Up  12/02/2023    MA Annual Health Assessment  01/01/2024    Annual Depression Screening  01/01/2024    Fall Risk Screening (Annual)  01/01/2024       Patient/Caregiver Education: There are no barriers to learning. Medical education done.   Outcome: Patient verbalizes understanding. Patient is notified to call with any questions, complications, allergies, or worsening or changing symptoms.  Patient is to call with any side effects or complications from the treatments as a result of today.     Problem List:  Patient Active Problem List   Diagnosis    Other screening mammogram    Benign essential HTN    Primary osteoarthritis of right knee    Acute medial meniscus tear of right knee, initial encounter    Hypercholesteremia    Flushing, menopausal    Pepper's esophagus without dysplasia    Renal insufficiency    ISIS on CPAP    Vertigo    Bradycardia    Dyslipidemia    Other constipation    Poor short term memory    Anxiety    Inadequate vitamin B6 intake    GARRY (stress urinary incontinence, female)    Paraesophageal hernia    Primary hypertension    CKD (chronic kidney disease) stage 3, GFR 30-59 ml/min (HCC)    Fibromuscular dysplasia of right renal artery (HCC)       Return in about 6 months (around 8/16/2024) for medicare annual wellness visit .    Gabriela Arreola MD  [facility]  02/16/24      Please note that portions of this note may have been completed with a voice recognition program. Efforts were made to edit the dictations  but occasionally words are mis-transcribed. Thank you for your understanding.

## 2024-04-15 ENCOUNTER — LAB ENCOUNTER (OUTPATIENT)
Dept: LAB | Age: 74
End: 2024-04-15
Attending: STUDENT IN AN ORGANIZED HEALTH CARE EDUCATION/TRAINING PROGRAM
Payer: MEDICARE

## 2024-04-15 DIAGNOSIS — R73.01 ELEVATED FASTING GLUCOSE: ICD-10-CM

## 2024-04-15 DIAGNOSIS — E78.00 HYPERCHOLESTEREMIA: ICD-10-CM

## 2024-04-15 DIAGNOSIS — E55.9 VITAMIN D DEFICIENCY: ICD-10-CM

## 2024-04-15 DIAGNOSIS — N18.32 STAGE 3B CHRONIC KIDNEY DISEASE (HCC): ICD-10-CM

## 2024-04-15 DIAGNOSIS — I10 PRIMARY HYPERTENSION: Primary | ICD-10-CM

## 2024-04-15 LAB
ALBUMIN SERPL-MCNC: 3.6 G/DL (ref 3.4–5)
ALBUMIN/GLOB SERPL: 0.9 {RATIO} (ref 1–2)
ALP LIVER SERPL-CCNC: 65 U/L
ALT SERPL-CCNC: 29 U/L
ANION GAP SERPL CALC-SCNC: 10 MMOL/L (ref 0–18)
AST SERPL-CCNC: 28 U/L (ref 15–37)
BILIRUB SERPL-MCNC: 0.4 MG/DL (ref 0.1–2)
BUN BLD-MCNC: 15 MG/DL (ref 9–23)
CALCIUM BLD-MCNC: 9.7 MG/DL (ref 8.5–10.1)
CHLORIDE SERPL-SCNC: 102 MMOL/L (ref 98–112)
CHOLEST SERPL-MCNC: 175 MG/DL (ref ?–200)
CO2 SERPL-SCNC: 26 MMOL/L (ref 21–32)
CREAT BLD-MCNC: 1.05 MG/DL
EGFRCR SERPLBLD CKD-EPI 2021: 56 ML/MIN/1.73M2 (ref 60–?)
FASTING PATIENT LIPID ANSWER: YES
FASTING STATUS PATIENT QL REPORTED: YES
GLOBULIN PLAS-MCNC: 4 G/DL (ref 2.8–4.4)
GLUCOSE BLD-MCNC: 105 MG/DL (ref 70–99)
HDLC SERPL-MCNC: 80 MG/DL (ref 40–59)
LDLC SERPL CALC-MCNC: 75 MG/DL (ref ?–100)
NONHDLC SERPL-MCNC: 95 MG/DL (ref ?–130)
OSMOLALITY SERPL CALC.SUM OF ELEC: 287 MOSM/KG (ref 275–295)
POTASSIUM SERPL-SCNC: 3.6 MMOL/L (ref 3.5–5.1)
PROT SERPL-MCNC: 7.6 G/DL (ref 6.4–8.2)
SODIUM SERPL-SCNC: 138 MMOL/L (ref 136–145)
TRIGL SERPL-MCNC: 117 MG/DL (ref 30–149)
VIT D+METAB SERPL-MCNC: 36.8 NG/ML (ref 30–100)
VLDLC SERPL CALC-MCNC: 18 MG/DL (ref 0–30)

## 2024-04-15 PROCEDURE — 82306 VITAMIN D 25 HYDROXY: CPT

## 2024-04-15 PROCEDURE — 80053 COMPREHEN METABOLIC PANEL: CPT

## 2024-04-15 PROCEDURE — 36415 COLL VENOUS BLD VENIPUNCTURE: CPT

## 2024-04-15 PROCEDURE — 80061 LIPID PANEL: CPT

## 2024-05-16 DIAGNOSIS — E78.00 HYPERCHOLESTEREMIA: ICD-10-CM

## 2024-05-16 RX ORDER — HYDROCHLOROTHIAZIDE 12.5 MG/1
12.5 TABLET ORAL DAILY
Qty: 90 TABLET | Refills: 1 | Status: SHIPPED | OUTPATIENT
Start: 2024-05-16

## 2024-05-16 RX ORDER — ROSUVASTATIN CALCIUM 20 MG/1
20 TABLET, COATED ORAL NIGHTLY
Qty: 90 TABLET | Refills: 0 | Status: SHIPPED | OUTPATIENT
Start: 2024-05-16

## 2024-05-16 NOTE — TELEPHONE ENCOUNTER
Last office visit: 2/16/2024  Last Refill: 8/29/2024  Return To Clinic: 8/16/2024  Protocol:   Medication Quantity Refills Start End   hydroCHLOROthiazide 25 MG Oral Tab (Discontinued) -- -- 8/29/2023 12/19/2023   Sig:   Take 0.5 tablets (12.5 mg total) by mouth daily.     Route:   Oral

## 2024-05-16 NOTE — TELEPHONE ENCOUNTER
Last office visit: 2/16/2024  Last Refill: 11/13/2023  Return To Clinic: 8/16/2024  Protocol: passed  Medication Quantity Refills Start End   rosuvastatin 20 MG Oral Tab 90 tablet 1 11/13/2023 --   Sig:   Take 1 tablet (20 mg total) by mouth nightly.     Route:   Oral

## 2024-05-20 ENCOUNTER — LAB REQUISITION (OUTPATIENT)
Dept: LAB | Facility: HOSPITAL | Age: 74
End: 2024-05-20

## 2024-05-20 DIAGNOSIS — K64.2 THIRD DEGREE HEMORRHOIDS: ICD-10-CM

## 2024-05-20 PROCEDURE — 88304 TISSUE EXAM BY PATHOLOGIST: CPT | Performed by: COLON & RECTAL SURGERY

## 2024-05-24 DIAGNOSIS — F32.A DEPRESSION, UNSPECIFIED DEPRESSION TYPE: ICD-10-CM

## 2024-05-24 RX ORDER — AMLODIPINE BESYLATE 5 MG/1
5 TABLET ORAL DAILY
Qty: 30 TABLET | Refills: 11 | OUTPATIENT
Start: 2024-05-24

## 2024-05-24 RX ORDER — SERTRALINE HYDROCHLORIDE 25 MG/1
25 TABLET, FILM COATED ORAL DAILY
Qty: 90 TABLET | Refills: 1 | OUTPATIENT
Start: 2024-05-24

## 2024-05-24 NOTE — TELEPHONE ENCOUNTER
Last Office Visit: 2/16/24  Next Office Visit: Return in about 6 months (around 8/16/2024) for medicare annual wellness visit .  Last Refill:2/16/24  Medication Quantity Refills Start End   sertraline 25 MG Oral Tab 90 tablet 1 2/16/2024 --   Sig:   Take 1 tablet (25 mg total) by mouth daily.         Please authorize if acceptable.   Thank you!

## 2024-06-11 ENCOUNTER — PATIENT MESSAGE (OUTPATIENT)
Dept: FAMILY MEDICINE CLINIC | Facility: CLINIC | Age: 74
End: 2024-06-11

## 2024-06-11 NOTE — TELEPHONE ENCOUNTER
From: Kirby Valerio  To: Gabriela Arreola  Sent: 6/11/2024 9:52 AM CDT  Subject: New Pharmacy    TO inform you I have changed pharmacy. I no longer use [facility] in [place].  I will be using [facility] [address] [place] [address] [address]  [place], [place] [phone].  If there are any concerns, please call me back. This will start today. Thank you.

## 2024-07-09 ENCOUNTER — PATIENT MESSAGE (OUTPATIENT)
Dept: FAMILY MEDICINE CLINIC | Facility: CLINIC | Age: 74
End: 2024-07-09

## 2024-07-09 NOTE — TELEPHONE ENCOUNTER
From: Kirby Valerio  To: Gabriela Arreola  Sent: 7/9/2024 7:23 AM CDT  Subject: Stopping Zoloft     I would like to stop taking Zoloft. I feel it's having a side effect on I don't like. My see drive is almost gone. It states that is one of the side effects.   What is the right way to wing me off of it Starting Now. Please advise. Thanks

## 2024-07-09 NOTE — TELEPHONE ENCOUNTER
Patient is wanting to stop Zoloft medication due to potential side effects (sex drive concern). She is asking what's the proper way to wean off medication. Routing to Dr. Arreola for further recommendation.

## 2024-07-10 NOTE — TELEPHONE ENCOUNTER
Patient is on a low dose it is reasonable to try off of the medication. I recommend cutting the tablet in half, so can start sertraline 12.5mg daily for 2 weeks, then can start sertraline 12.5mg every other day for 2 weeks, then can stop. If any intolerable withdrawal symptoms such as headaches, brain fog, brain zaps, dizziness, nausea then let me know. I typically ask patients to return in 2-3 months after stopping the medication to check in and see how mood is doing. Thank you.

## 2024-07-15 ENCOUNTER — PATIENT MESSAGE (OUTPATIENT)
Dept: FAMILY MEDICINE CLINIC | Facility: CLINIC | Age: 74
End: 2024-07-15

## 2024-07-16 NOTE — TELEPHONE ENCOUNTER
From: Kirby Valerio  To: Gabriela Arreola  Sent: 7/15/2024 4:46 PM CDT  Subject: New Pharmacy    I had to change my pharmacy again due to insurance issues. I have new pharmacy [facility]. I have changed it in my profile. I will also make appt to see her in 2 months.

## 2024-08-11 DIAGNOSIS — E78.00 HYPERCHOLESTEREMIA: ICD-10-CM

## 2024-08-12 RX ORDER — ROSUVASTATIN CALCIUM 20 MG/1
20 TABLET, COATED ORAL NIGHTLY
Qty: 30 TABLET | Refills: 0 | Status: SHIPPED | OUTPATIENT
Start: 2024-08-12

## 2024-08-12 NOTE — TELEPHONE ENCOUNTER
Last Office Visit: 2/16/24  Last Refill: 5/16/24  Return to Clinic: 6 months   Protocol: passed  NOV: 9/4/24  Requested Prescriptions     Pending Prescriptions Disp Refills    rosuvastatin 20 MG Oral Tab 90 tablet 0     Sig: Take 1 tablet (20 mg total) by mouth nightly.

## 2024-08-23 DIAGNOSIS — F32.A DEPRESSION, UNSPECIFIED DEPRESSION TYPE: ICD-10-CM

## 2024-08-23 RX ORDER — SERTRALINE HYDROCHLORIDE 25 MG/1
25 TABLET, FILM COATED ORAL DAILY
Qty: 30 TABLET | Refills: 0 | Status: SHIPPED | OUTPATIENT
Start: 2024-08-23 | End: 2024-09-04

## 2024-08-23 NOTE — TELEPHONE ENCOUNTER
Did not pass protocol  Last office visit 2/16/2024  Last refill was: , 2/16/2024__tabs  Next appointment:  9/4/2024    Please sign pended medication if appropriate     Medication Quantity Refills Start End   sertraline 25 MG Oral Tab 90 tablet 1 2/16/2024 --   Sig:   Take 1 tablet (25 mg total) by mouth daily.     Route:   Oral

## 2024-08-27 ENCOUNTER — PATIENT MESSAGE (OUTPATIENT)
Dept: FAMILY MEDICINE CLINIC | Facility: CLINIC | Age: 74
End: 2024-08-27

## 2024-08-27 DIAGNOSIS — R73.01 ELEVATED FASTING GLUCOSE: ICD-10-CM

## 2024-08-27 DIAGNOSIS — E78.00 HYPERCHOLESTEREMIA: ICD-10-CM

## 2024-08-27 DIAGNOSIS — I10 PRIMARY HYPERTENSION: ICD-10-CM

## 2024-08-27 NOTE — TELEPHONE ENCOUNTER
From: Kirby Valerio  To: Gabriela Arreola  Sent: 8/27/2024 8:43 AM CDT  Subject: Lab testing     I had made appointment for my labs to be drawn today,they had my appt but no orders.  Can you please submit a order so I can have the result to go over on my next appt. Next week I can schedule my appointment. Thanks

## 2024-08-27 NOTE — TELEPHONE ENCOUNTER
Labs previously placed but due in October.  Okay to change the date of the orders to be completed now?  Patient has upcoming appointment.    Future Appointments   Date Time Provider Department Center   9/4/2024  9:00 AM Gabriela Arreola MD [facility] [id number]

## 2024-08-27 NOTE — TELEPHONE ENCOUNTER
That's fine to do these orders prior to upcoming appointment, please let patient know that the orders were present but originally future dated to October 2024. Thank you.

## 2024-08-29 ENCOUNTER — LAB ENCOUNTER (OUTPATIENT)
Dept: LAB | Age: 74
End: 2024-08-29
Attending: STUDENT IN AN ORGANIZED HEALTH CARE EDUCATION/TRAINING PROGRAM
Payer: MEDICARE

## 2024-08-29 DIAGNOSIS — R73.01 ELEVATED FASTING GLUCOSE: ICD-10-CM

## 2024-08-29 DIAGNOSIS — E78.00 HYPERCHOLESTEREMIA: ICD-10-CM

## 2024-08-29 DIAGNOSIS — I10 PRIMARY HYPERTENSION: ICD-10-CM

## 2024-08-29 LAB
ALBUMIN SERPL-MCNC: 4.6 G/DL (ref 3.2–4.8)
ALBUMIN/GLOB SERPL: 1.4 {RATIO} (ref 1–2)
ALP LIVER SERPL-CCNC: 69 U/L
ALT SERPL-CCNC: 25 U/L
ANION GAP SERPL CALC-SCNC: 2 MMOL/L (ref 0–18)
AST SERPL-CCNC: 33 U/L (ref ?–34)
BASOPHILS # BLD AUTO: 0.05 X10(3) UL (ref 0–0.2)
BASOPHILS NFR BLD AUTO: 0.8 %
BILIRUB SERPL-MCNC: 0.5 MG/DL (ref 0.2–1.1)
BILIRUB UR QL STRIP.AUTO: NEGATIVE
BUN BLD-MCNC: 19 MG/DL (ref 9–23)
CALCIUM BLD-MCNC: 11.2 MG/DL (ref 8.7–10.4)
CHLORIDE SERPL-SCNC: 103 MMOL/L (ref 98–112)
CHOLEST SERPL-MCNC: 181 MG/DL (ref ?–200)
CLARITY UR REFRACT.AUTO: CLEAR
CO2 SERPL-SCNC: 33 MMOL/L (ref 21–32)
COLOR UR AUTO: COLORLESS
CREAT BLD-MCNC: 1.1 MG/DL
EGFRCR SERPLBLD CKD-EPI 2021: 53 ML/MIN/1.73M2 (ref 60–?)
EOSINOPHIL # BLD AUTO: 0.24 X10(3) UL (ref 0–0.7)
EOSINOPHIL NFR BLD AUTO: 3.9 %
ERYTHROCYTE [DISTWIDTH] IN BLOOD BY AUTOMATED COUNT: 14.8 %
EST. AVERAGE GLUCOSE BLD GHB EST-MCNC: 117 MG/DL (ref 68–126)
FASTING PATIENT LIPID ANSWER: YES
FASTING STATUS PATIENT QL REPORTED: YES
GLOBULIN PLAS-MCNC: 3.2 G/DL (ref 2–3.5)
GLUCOSE BLD-MCNC: 102 MG/DL (ref 70–99)
GLUCOSE UR STRIP.AUTO-MCNC: NORMAL MG/DL
HBA1C MFR BLD: 5.7 % (ref ?–5.7)
HCT VFR BLD AUTO: 40.6 %
HDLC SERPL-MCNC: 79 MG/DL (ref 40–59)
HGB BLD-MCNC: 13.2 G/DL
IMM GRANULOCYTES # BLD AUTO: 0.02 X10(3) UL (ref 0–1)
IMM GRANULOCYTES NFR BLD: 0.3 %
KETONES UR STRIP.AUTO-MCNC: NEGATIVE MG/DL
LDLC SERPL CALC-MCNC: 85 MG/DL (ref ?–100)
LEUKOCYTE ESTERASE UR QL STRIP.AUTO: NEGATIVE
LYMPHOCYTES # BLD AUTO: 1.42 X10(3) UL (ref 1–4)
LYMPHOCYTES NFR BLD AUTO: 23.2 %
MCH RBC QN AUTO: 29.3 PG (ref 26–34)
MCHC RBC AUTO-ENTMCNC: 32.5 G/DL (ref 31–37)
MCV RBC AUTO: 90 FL
MONOCYTES # BLD AUTO: 0.64 X10(3) UL (ref 0.1–1)
MONOCYTES NFR BLD AUTO: 10.5 %
NEUTROPHILS # BLD AUTO: 3.75 X10 (3) UL (ref 1.5–7.7)
NEUTROPHILS # BLD AUTO: 3.75 X10(3) UL (ref 1.5–7.7)
NEUTROPHILS NFR BLD AUTO: 61.3 %
NITRITE UR QL STRIP.AUTO: NEGATIVE
NONHDLC SERPL-MCNC: 102 MG/DL (ref ?–130)
OSMOLALITY SERPL CALC.SUM OF ELEC: 288 MOSM/KG (ref 275–295)
PH UR STRIP.AUTO: 7 [PH] (ref 5–8)
PLATELET # BLD AUTO: 266 10(3)UL (ref 150–450)
POTASSIUM SERPL-SCNC: 4.2 MMOL/L (ref 3.5–5.1)
PROT SERPL-MCNC: 7.8 G/DL (ref 5.7–8.2)
PROT UR STRIP.AUTO-MCNC: NEGATIVE MG/DL
RBC # BLD AUTO: 4.51 X10(6)UL
RBC UR QL AUTO: NEGATIVE
SODIUM SERPL-SCNC: 138 MMOL/L (ref 136–145)
SP GR UR STRIP.AUTO: 1 (ref 1–1.03)
TRIGL SERPL-MCNC: 94 MG/DL (ref 30–149)
TSI SER-ACNC: 2.96 MIU/ML (ref 0.55–4.78)
UROBILINOGEN UR STRIP.AUTO-MCNC: NORMAL MG/DL
VLDLC SERPL CALC-MCNC: 15 MG/DL (ref 0–30)
WBC # BLD AUTO: 6.1 X10(3) UL (ref 4–11)

## 2024-08-29 PROCEDURE — 80053 COMPREHEN METABOLIC PANEL: CPT

## 2024-08-29 PROCEDURE — 85025 COMPLETE CBC W/AUTO DIFF WBC: CPT

## 2024-08-29 PROCEDURE — 83036 HEMOGLOBIN GLYCOSYLATED A1C: CPT

## 2024-08-29 PROCEDURE — 84443 ASSAY THYROID STIM HORMONE: CPT

## 2024-08-29 PROCEDURE — 80061 LIPID PANEL: CPT

## 2024-08-29 PROCEDURE — 36415 COLL VENOUS BLD VENIPUNCTURE: CPT

## 2024-08-29 PROCEDURE — 81003 URINALYSIS AUTO W/O SCOPE: CPT

## 2024-09-04 ENCOUNTER — LAB ENCOUNTER (OUTPATIENT)
Dept: LAB | Age: 74
End: 2024-09-04
Attending: STUDENT IN AN ORGANIZED HEALTH CARE EDUCATION/TRAINING PROGRAM
Payer: MEDICARE

## 2024-09-04 ENCOUNTER — OFFICE VISIT (OUTPATIENT)
Dept: FAMILY MEDICINE CLINIC | Facility: CLINIC | Age: 74
End: 2024-09-04
Payer: MEDICARE

## 2024-09-04 VITALS
HEIGHT: 64 IN | WEIGHT: 193.38 LBS | RESPIRATION RATE: 16 BRPM | SYSTOLIC BLOOD PRESSURE: 130 MMHG | BODY MASS INDEX: 33.02 KG/M2 | TEMPERATURE: 97 F | HEART RATE: 66 BPM | DIASTOLIC BLOOD PRESSURE: 80 MMHG

## 2024-09-04 DIAGNOSIS — E55.9 VITAMIN D DEFICIENCY: ICD-10-CM

## 2024-09-04 DIAGNOSIS — I77.3 FIBROMUSCULAR DYSPLASIA OF RIGHT RENAL ARTERY (HCC): ICD-10-CM

## 2024-09-04 DIAGNOSIS — M17.0 BILATERAL PRIMARY OSTEOARTHRITIS OF KNEE: ICD-10-CM

## 2024-09-04 DIAGNOSIS — E83.52 HYPERCALCEMIA: ICD-10-CM

## 2024-09-04 DIAGNOSIS — Z12.31 VISIT FOR SCREENING MAMMOGRAM: ICD-10-CM

## 2024-09-04 DIAGNOSIS — G47.33 OSA ON CPAP: ICD-10-CM

## 2024-09-04 DIAGNOSIS — I47.29 NSVT (NONSUSTAINED VENTRICULAR TACHYCARDIA) (HCC): ICD-10-CM

## 2024-09-04 DIAGNOSIS — N95.1 FLUSHING, MENOPAUSAL: ICD-10-CM

## 2024-09-04 DIAGNOSIS — F33.42 RECURRENT MAJOR DEPRESSIVE DISORDER, IN FULL REMISSION (HCC): ICD-10-CM

## 2024-09-04 DIAGNOSIS — N18.31 STAGE 3A CHRONIC KIDNEY DISEASE (HCC): Chronic | ICD-10-CM

## 2024-09-04 DIAGNOSIS — E78.00 HYPERCHOLESTEREMIA: ICD-10-CM

## 2024-09-04 DIAGNOSIS — R59.0 ENLARGED LYMPH NODE IN NECK: ICD-10-CM

## 2024-09-04 DIAGNOSIS — R73.03 PREDIABETES: ICD-10-CM

## 2024-09-04 DIAGNOSIS — B00.1 RECURRENT COLD SORES: ICD-10-CM

## 2024-09-04 DIAGNOSIS — Z78.0 POSTMENOPAUSAL: ICD-10-CM

## 2024-09-04 DIAGNOSIS — I10 PRIMARY HYPERTENSION: ICD-10-CM

## 2024-09-04 DIAGNOSIS — Z00.00 ENCOUNTER FOR MEDICARE ANNUAL WELLNESS EXAM: Primary | ICD-10-CM

## 2024-09-04 DIAGNOSIS — N18.31 STAGE 3A CHRONIC KIDNEY DISEASE (HCC): ICD-10-CM

## 2024-09-04 LAB
CALCIUM BLD-MCNC: 10.4 MG/DL (ref 8.7–10.4)
CREAT BLD-MCNC: 1.1 MG/DL
ESTRADIOL SERPL-MCNC: 29.8 PG/ML
FSH SERPL-ACNC: 23.5 MIU/ML
LH SERPL-ACNC: 12.6 MIU/ML
PHOSPHATE SERPL-MCNC: 3.4 MG/DL (ref 2.4–5.1)
PTH-INTACT SERPL-MCNC: 23 PG/ML (ref 18.5–88)
VIT D+METAB SERPL-MCNC: 36.4 NG/ML (ref 30–100)

## 2024-09-04 PROCEDURE — 1125F AMNT PAIN NOTED PAIN PRSNT: CPT | Performed by: STUDENT IN AN ORGANIZED HEALTH CARE EDUCATION/TRAINING PROGRAM

## 2024-09-04 PROCEDURE — 82565 ASSAY OF CREATININE: CPT

## 2024-09-04 PROCEDURE — 83001 ASSAY OF GONADOTROPIN (FSH): CPT

## 2024-09-04 PROCEDURE — 82310 ASSAY OF CALCIUM: CPT

## 2024-09-04 PROCEDURE — 1170F FXNL STATUS ASSESSED: CPT | Performed by: STUDENT IN AN ORGANIZED HEALTH CARE EDUCATION/TRAINING PROGRAM

## 2024-09-04 PROCEDURE — 3075F SYST BP GE 130 - 139MM HG: CPT | Performed by: STUDENT IN AN ORGANIZED HEALTH CARE EDUCATION/TRAINING PROGRAM

## 2024-09-04 PROCEDURE — 82670 ASSAY OF TOTAL ESTRADIOL: CPT

## 2024-09-04 PROCEDURE — 84100 ASSAY OF PHOSPHORUS: CPT

## 2024-09-04 PROCEDURE — 3008F BODY MASS INDEX DOCD: CPT | Performed by: STUDENT IN AN ORGANIZED HEALTH CARE EDUCATION/TRAINING PROGRAM

## 2024-09-04 PROCEDURE — 99499 UNLISTED E&M SERVICE: CPT | Performed by: STUDENT IN AN ORGANIZED HEALTH CARE EDUCATION/TRAINING PROGRAM

## 2024-09-04 PROCEDURE — 83002 ASSAY OF GONADOTROPIN (LH): CPT

## 2024-09-04 PROCEDURE — 1159F MED LIST DOCD IN RCRD: CPT | Performed by: STUDENT IN AN ORGANIZED HEALTH CARE EDUCATION/TRAINING PROGRAM

## 2024-09-04 PROCEDURE — 82306 VITAMIN D 25 HYDROXY: CPT

## 2024-09-04 PROCEDURE — 1160F RVW MEDS BY RX/DR IN RCRD: CPT | Performed by: STUDENT IN AN ORGANIZED HEALTH CARE EDUCATION/TRAINING PROGRAM

## 2024-09-04 PROCEDURE — 3079F DIAST BP 80-89 MM HG: CPT | Performed by: STUDENT IN AN ORGANIZED HEALTH CARE EDUCATION/TRAINING PROGRAM

## 2024-09-04 PROCEDURE — 96160 PT-FOCUSED HLTH RISK ASSMT: CPT | Performed by: STUDENT IN AN ORGANIZED HEALTH CARE EDUCATION/TRAINING PROGRAM

## 2024-09-04 PROCEDURE — 99214 OFFICE O/P EST MOD 30 MIN: CPT | Performed by: STUDENT IN AN ORGANIZED HEALTH CARE EDUCATION/TRAINING PROGRAM

## 2024-09-04 PROCEDURE — G0439 PPPS, SUBSEQ VISIT: HCPCS | Performed by: STUDENT IN AN ORGANIZED HEALTH CARE EDUCATION/TRAINING PROGRAM

## 2024-09-04 PROCEDURE — 83970 ASSAY OF PARATHORMONE: CPT

## 2024-09-04 RX ORDER — ROSUVASTATIN CALCIUM 20 MG/1
20 TABLET, COATED ORAL NIGHTLY
Qty: 90 TABLET | Refills: 1 | Status: SHIPPED | OUTPATIENT
Start: 2024-09-04

## 2024-09-04 RX ORDER — ESTRADIOL 1 MG/1
1 TABLET ORAL DAILY
Qty: 90 TABLET | Refills: 1 | Status: SHIPPED | OUTPATIENT
Start: 2024-09-04

## 2024-09-04 RX ORDER — VALACYCLOVIR HYDROCHLORIDE 500 MG/1
500 TABLET, FILM COATED ORAL 2 TIMES DAILY
Qty: 12 TABLET | Refills: 2 | Status: SHIPPED | OUTPATIENT
Start: 2024-09-04

## 2024-09-04 RX ORDER — HYDROCHLOROTHIAZIDE 12.5 MG/1
12.5 TABLET ORAL DAILY
Qty: 90 TABLET | Refills: 1 | Status: SHIPPED | OUTPATIENT
Start: 2024-09-04

## 2024-09-04 RX ORDER — PAROXETINE 10 MG/1
10 TABLET, FILM COATED ORAL EVERY MORNING
Qty: 90 TABLET | Refills: 0 | Status: SHIPPED | OUTPATIENT
Start: 2024-09-04

## 2024-09-04 NOTE — PROGRESS NOTES
Subjective:   Kirby Valerio is a 74 year old female who presents for a MA AHA (Medicare Advantage Annual Health Assessment) and Subsequent Annual Wellness visit (Pt already had Initial Annual Wellness) and scheduled follow up of multiple significant but stable problems.     Noticed a bump on the back of her right neck it was the size of her thumb, now it is starting to improve. This started two weeks ago.    Losing a bit more hair lately.      Feeling a lot better off the zoloft. She feels much less lethargic. Now has more motivation. Sometimes irritability but this is better than have no variability. No longer has     More hot flashes at nighttime - pillow will be soaked.     Was taking estradiol 0.5mg, then increased to 1mg daily.     Following with Dr. Brady for knees. Considering another gel shot because the previous lasted 6 years. Over the past year noticed more pain in the right knee.    Some increased reflux. To see Dr. Koch soon. Taking pantoprazole 20mg. Taking more tums.     Following with Dr. Cordova.     Had eye exam. Right eye mild cataract. Left eye clear. Right eye needs news lens.    Taking vitamin D 2000iu daily after breakfast.     Patient has hx of cold sores, would like valtrex.    Med/Surg/Allergy/Fam hx updates: see HPI  Home: trying to sell house at end of September or early October 2024 - [place]   Work: retired  Diet: trying to eat healthy overall  Exercise: water aerobics 3-4 times a week; walking a mile  Sleep: will take OTC sleep aid as needed - 4-5 hours of sleep; will wake up sometime use bathroom and go back to bed  Mood: good overall  Habits: alcohol use  Periods: No LMP recorded. Patient has had a hysterectomy.  Immunizations: due for new covid shot and new flu shot  Screenings: mammogram pucoming    Patient presents for recheck of her hypertension. Pt has been taking medications as instructed, no medication side effects.  BP Meds:  amLODIPine Tabs - 5 MG; hydroCHLOROthiazide Tabs - 12.5 MG; losartan Tabs - 50 MG     Last Cr was 1.1 done on 8/29/2024.Last eGFR was 53 on 8/29/2024.    Patient presents for follow up of hyperlipidemia. Patient has been taking medications as prescribed, no muscle aches noted. Working on diet and exercise.   Cholesterol Meds: rosuvastatin Tabs - 20 MG   Cholesterol: 181, done on 8/29/2024.  HDL Cholesterol: 79, done on 8/29/2024.  LDL Cholesterol: 85, done on 8/29/2024.  TriGlycerides 94, done on 8/29/2024.    Wt Readings from Last 6 Encounters:   09/04/24 193 lb 6.4 oz (87.7 kg)   02/16/24 191 lb 6.4 oz (86.8 kg)   12/28/23 185 lb (83.9 kg)   11/02/23 189 lb 4 oz (85.8 kg)   09/18/23 184 lb (83.5 kg)   08/15/23 183 lb (83 kg)     History/Other:   Fall Risk Assessment:   She has been screened for Falls and is High Risk. Fall Prevention information provided to patient in After Visit Summary.    Do you feel unsteady when standing or walking?: Yes  Do you worry about falling?: No  Have you fallen in the past year?: No     Cognitive Assessment:   She had a completely normal cognitive assessment - see flowsheet entries     Functional Ability/Status:   Kirby Valerio has some abnormal functions as listed below:  She has Vision problems based on screening of functional status. She has problems with Memory based on screening of functional status.       Depression Screening (PHQ):  PHQ-2 SCORE: 0  , done 9/4/2024             Advanced Directives:   She does have a Living Will but we do NOT have it on file in Epic.    She does have a POA but we do NOT have it on file in [employer].    Patient has Advance Care Planning documents but we do not have a copy in EMR. Discussed Advanced Care Planning with patient and instructed patient to get our office a copy to be scanned into EMR.      Patient Active Problem List   Diagnosis    Other screening mammogram    Benign essential HTN    Primary osteoarthritis of right knee    Acute medial  meniscus tear of right knee, initial encounter    Hypercholesteremia    Flushing, menopausal    Pepper's esophagus without dysplasia    Renal insufficiency    ISIS on CPAP    Vertigo    Bradycardia    Dyslipidemia    Other constipation    Poor short term memory    Anxiety    Inadequate vitamin B6 intake    GARRY (stress urinary incontinence, female)    Paraesophageal hernia    Primary hypertension    CKD (chronic kidney disease) stage 3, GFR 30-59 ml/min (HCC)    Fibromuscular dysplasia of right renal artery (HCC)    Recurrent major depressive disorder, in full remission (HCC)    NSVT (nonsustained ventricular tachycardia) ([facility])    Bilateral primary osteoarthritis of knee    Recurrent cold sores    Vitamin D deficiency    Prediabetes     Allergies:  She is allergic to adhesive tape, aspirin, iodinated diagnostic agents [radiology contrast iodinated dyes], atorvastatin, shellfish-derived products, and sulfa antibiotics.    Current Medications:  Outpatient Medications Marked as Taking for the 9/4/24 encounter (Office Visit) with Gabriela Arreola MD   Medication Sig    estradiol 1 MG Oral Tab Take 1 tablet (1 mg total) by mouth daily.    hydroCHLOROthiazide 12.5 MG Oral Tab Take 1 tablet (12.5 mg total) by mouth daily.    rosuvastatin 20 MG Oral Tab Take 1 tablet (20 mg total) by mouth nightly.    PARoxetine 10 MG Oral Tab Take 1 tablet (10 mg total) by mouth every morning.    valACYclovir 500 MG Oral Tab Take 1 tablet (500 mg total) by mouth 2 (two) times daily.    losartan 50 MG Oral Tab Take 1 tablet (50 mg total) by mouth 2 (two) times daily.    amLODIPine 5 MG Oral Tab Take 1 tablet (5 mg total) by mouth daily.    pantoprazole 20 MG Oral Tab EC 1 tablet (20 mg total). Once daily, can take up to bid    Vitamin D3, Cholecalciferol, 25 MCG (1000 UT) Oral Tab Take 2 tablets (2,000 Units total) by mouth daily.    Multiple Vitamins-Minerals (EYE VITAMINS OR) Take 1 tablet by mouth daily.       Medical History:  She   has a past medical history of Pepper's esophagus, Diverticulosis of large intestine, Esophageal reflux, High blood pressure, High cholesterol, History of stomach ulcers, ISIS (obstructive sleep apnea) (04/11/18 [facility]), Osteoarthritis, Sleep apnea, and GARRY (stress urinary incontinence, female).  Surgical History:  She  has a past surgical history that includes hysterectomy (1982); isaac localization wire 1 site right (cpt=19281) (2015); sinus surgery  ; other; colonoscopy (N/A, 12/11/2017); sling oper stres incontinence (03/01/2021); other surgical history (03/01/2021); other; hernia surgery (12/29/2021); and repair of rectum,prolapse mucosa.   Family History:  Her family history includes Breast Cancer (age of onset: 75) in her maternal aunt; Cancer in her mother; Heart Disorder in her brother, father, maternal grandfather, maternal grandmother, and paternal grandfather.  Social History:  She  reports that she has never smoked. She has never used smokeless tobacco. She reports current alcohol use. She reports that she does not use drugs.    Tobacco:  She has never smoked tobacco.    CAGE Alcohol Screen:   CAGE screening score of 0 on 8/28/2024, showing low risk of alcohol abuse.      Patient Care Team:  Gabriela Arreola MD as PCP - General (Family Medicine)  Monserrat Tse MD as Consulting Physician (NEUROLOGY)  Angelo Koch MD as Consulting Physician (GASTROENTEROLOGY)  Marlo Logan MD (CARDIOLOGY)  Knobloch, Keeley, PT as Physical Therapist  Nikolas Chapman PT as Physical Therapist  Michael Corrales PT as Physical Therapist    Review of Systems  GENERAL: feels well otherwise  SKIN: denies any unusual skin lesions  EYES: denies blurred vision or double vision  HEENT: denies nasal congestion, sinus pain or ST  LUNGS: denies shortness of breath with exertion  CARDIOVASCULAR: denies chest pain on exertion  GI: denies abdominal pain, denies heartburn  : denies dysuria, vaginal discharge or itching, no complaint of  urinary incontinence   MUSCULOSKELETAL: denies back pain  NEURO: denies headaches  PSYCHE: denies depression or anxiety  HEMATOLOGIC: denies hx of anemia  ENDOCRINE: denies thyroid history  ALL/ASTHMA: denies hx of allergy or asthma    Objective:   Physical Exam  General Appearance:  Alert, cooperative, no distress, appears stated age   Head:  Normocephalic, without obvious abnormality, atraumatic   Eyes:  PERRL, conjunctiva/corneas clear, EOM's intact both eyes   Ears:  Normal TM's and external ear canals, both ears   Nose: Nares normal, septum midline,mucosa normal, no drainage   Throat: Lips, mucosa, and tongue normal; teeth and gums normal   Neck: Supple, symmetrical, trachea midline, no adenopathy;  thyroid: not enlarged, symmetric, no tenderness/mass/nodules; no carotid bruit   Back:   Symmetric, no curvature, ROM normal   Lungs:   Clear to auscultation bilaterally, respirations unlabored   Heart:  Regular rate and rhythm, S1 and S2 normal, no murmur, rub, or gallop   Abdomen:   Soft, non-tender, bowel sounds active all four quadrants,  no masses, no organomegaly   Pelvic: Deferred   Extremities: Extremities normal, atraumatic, no cyanosis or edema   Pulses: 2+ and symmetric   Skin: Skin color, texture, turgor normal, no rashes or lesions   Lymph nodes: Right posterior superior cervical chain with enlarged lymph node 1cm, mobile and nontender   Neurologic: Normal       /80   Pulse 66   Temp 97.2 °F (36.2 °C) (Temporal)   Resp 16   Ht 5' 4\" (1.626 m)   Wt 193 lb 6.4 oz (87.7 kg)   BMI 33.20 kg/m²  Estimated body mass index is 33.2 kg/m² as calculated from the following:    Height as of this encounter: 5' 4\" (1.626 m).    Weight as of this encounter: 193 lb 6.4 oz (87.7 kg).    Medicare Hearing Assessment:   Hearing Screening    Time taken: 9/4/2024  9:01 AM  Screening Method: Finger Rub  Finger Rub Result: Pass               Assessment & Plan:   Kirby Valerio is a 74 year old female who  presents for a Medicare Assessment.     1. Encounter for Medicare annual wellness exam (Primary)  2. Visit for screening mammogram  -     3D Mammogram Digital Screen, Bilateral (CPT=77067/20186); Future; Expected date: 11/17/2024  3. Flushing, menopausal  Patient has hx of menopausal flushing on estradiol 1mg daily  - will check hormone levels  - will trial paxil 10mg daily  - monitor for side effects  - follow up in ~3mo to reassess, sooner if needed  - if doing well can follow up in 6mo  -     Estradiol; Take 1 tablet (1 mg total) by mouth daily.  Dispense: 90 tablet; Refill: 1  -     PARoxetine HCl; Take 1 tablet (10 mg total) by mouth every morning.  Dispense: 90 tablet; Refill: 0  -     Estradiol; Future; Expected date: 09/04/2024  -     LH (Luteinizing Hormone); Future; Expected date: 09/04/2024  -     FSH; Future; Expected date: 09/04/2024  4. Postmenopausal  -     Estradiol; Take 1 tablet (1 mg total) by mouth daily.  Dispense: 90 tablet; Refill: 1  5. Hypercholesteremia  Patient presents for follow up of HLD  - no side effects of medications  - will continue current regimen  - low fat diet and exercise  - follow up in 6mo or sooner if needed  -     Rosuvastatin Calcium; Take 1 tablet (20 mg total) by mouth nightly.  Dispense: 90 tablet; Refill: 1  -     Lipid Panel; Future; Expected date: 03/04/2025  -     Comp Metabolic Panel (14); Future; Expected date: 03/04/2025  6. Recurrent major depressive disorder, in full remission (HCC)  Doing very well, off of sertraline. See paxil as above  7. NSVT (nonsustained ventricular tachycardia) (HCC)  Following with cardiology, appreciate evaluation and recommendations  8. Stage 3a chronic kidney disease (HCC)  Following with nephrology. Due to elevated calcium will also check vitamin D and PTH  -     Vitamin D; Future; Expected date: 09/04/2024  -     PTH Intact with minerals; Future; Expected date: 09/04/2024  9. Fibromuscular dysplasia of right renal artery  (HCC)  Following with nephrology, appreciate evaluation and recommendations  10. Primary hypertension  Pt presents for follow up of HTN  - no red flag symptoms  - BP controlled  - continue current regimen  - RTC 6mo or sooner if needed  -     hydroCHLOROthiazide; Take 1 tablet (12.5 mg total) by mouth daily.  Dispense: 90 tablet; Refill: 1  11. ISIS on CPAP stable  12. Bilateral primary osteoarthritis of knee  Following with ortho, appreciate evaluation and recommendations  13. Vitamin D deficiency  -     Vitamin D; Future; Expected date: 09/04/2024  -     PTH Intact with minerals; Future; Expected date: 09/04/2024  14. Recurrent cold sores  Patient has hx of cold sores  - can use valtrex 500mg twice daily for 3-5 days at the start of the flare.  -     valACYclovir HCl; Take 1 tablet (500 mg total) by mouth 2 (two) times daily.  Dispense: 12 tablet; Refill: 2  15. Prediabetes  Will follow labs  16. Enlarged lymph node in neck  - advised conservative measures including avoiding manipulation of lymph nodes, and watchful waiting for 6-8 weeks for resolution    The patient indicates understanding of these issues and agrees to the plan.  Reinforced healthy diet, lifestyle, and exercise.      Return in 6 months (on 3/4/2025) for medication follow up, or sooner if needed.     Gabriela Arreola MD, 9/4/2024     Supplementary Documentation:   General Health:  In the past six months, have you lost more than 10 pounds without trying?: 2 - No  Has your appetite been poor?: No  Type of Diet: Balanced  How does the patient maintain a good energy level?: Appropriate Exercise  How would you describe your daily physical activity?: Moderate  How would you describe your current health state?: Good  How do you maintain positive mental well-being?: Social Interaction;Puzzles;Games;Visiting Friends;Visiting Family  On a scale of 0 to 10, with 0 being no pain and 10 being severe pain, what is your pain level?: 4 - (Moderate)  In the  past six months, have you experienced urine leakage?: 1-Yes  At any time do you feel concerned for the safety/well-being of yourself and/or your children, in your home or elsewhere?: No  Have you had any immunizations at another office such as Influenza, Hepatitis B, Tetanus, or Pneumococcal?: No    Health Maintenance   Topic Date Due    Zoster Vaccines (1 of 2) Never done    MA Annual Health Assessment  01/01/2024    COVID-19 Vaccine (6 - 2023-24 season) 09/01/2024    Mammogram  11/16/2024    Influenza Vaccine (1) 10/01/2024    Colorectal Cancer Screening  04/06/2028    DEXA Scan  Completed    Annual Depression Screening  Completed    Fall Risk Screening (Annual)  Completed    Pneumococcal Vaccine: 65+ Years  Completed

## 2024-09-04 NOTE — PATIENT INSTRUCTIONS
Refill policies:      Allow 3 business days for refills; controlled substances may take longer.  Contact your pharmacy at least 5-7 business days prior to running out of medication and have them send an electronic request or submit through the \"request refill\" option thru your [employer] account. No need to do both, as multiple requests will create an automated [employer] message to notify of a denial for one of the duplicated requests, causing you undue confusion.   Refills are NOT addressed on weekends; covering physicians do not authorize routine medications on weekends.  No narcotics or controlled substances are refilled after noon on Fridays or by on call physicians.  By law, narcotics cannot be faxed or phoned into your pharmacy.  If your prescription is due for a refill, you may be due for a follow up appointment. Please call our office at [phone] to make an appointment or schedule an appointment via [employer].  To best provide you care, patients receiving routine medications need to be seen at least twice a year. Patients receiving narcotic/controlled substance medications need to be seen at least once every 3 months.  In the event that your preferred pharmacy does not have the requested medication in stock (ie Backordered), it is your responsibility to find another pharmacy that has the requested medication available. We will gladly send a new prescription to that pharmacy at your request.  controlled substances may not be able to be filled out of state due to license restrictions.  If you have a planned trip, it's best to call your pharmacy at least 5-7 business days to prevent any delays in your medication refill.    Scheduling Tests:    If your physician has ordered radiology tests such as MRI or CT scans, please contact Central Scheduling at [phone] right away to schedule the test.  Once scheduled, the [facility] Centralized Referral Team will work with your insurance carrier to obtain pre-certification or  prior authorization.  Depending on your insurance carrier, approval may take 3-10 days.  It is highly recommended patients assure they have received an authorization before having a test performed.  If test is done without insurance authorization, patient may be responsible for the entire amount billed.      Precertification and Prior Authorizations:  If your physician has recommended that you have a procedure or additional testing performed the [facility] Centralized Referral Team will contact your insurance carrier to obtain pre-certification or prior authorization.    You are strongly encouraged to contact your insurance carrier to verify that your procedure/test has been approved and is a COVERED benefit.  Although the [facility] Centralized Referral Team does its due diligence, the insurance carrier gives the disclaimer that \"Although the procedure is authorized, this does not guarantee payment.\"    Ultimately the patient is responsible for payment.   Thank you for your understanding in this matter.  Paperwork Completion:  If you require FMLA or disability paperwork for your recovery, please make sure to either drop it off or have it faxed to our office at [phone]. Be sure the form has your name and date of birth on it.  The form will be faxed to our Forms Department and they will complete it for you.  There is a 25$ fee for all forms that need to be filled out.  Please be aware there is a 10-14 day turnaround time.  You will need to sign a release of information (KI) form if your paperwork does not come with one.  You may call the Forms Department with any questions at [phone].  Their fax number is [phone].        Information about healthcare power of     [email or web address]    [email or web address]

## 2024-09-05 ENCOUNTER — MED REC SCAN ONLY (OUTPATIENT)
Dept: FAMILY MEDICINE CLINIC | Facility: CLINIC | Age: 74
End: 2024-09-05

## 2024-09-06 DIAGNOSIS — R23.2 HOT FLASHES: Primary | ICD-10-CM

## 2024-09-06 DIAGNOSIS — Z79.890 POST-MENOPAUSE ON HRT (HORMONE REPLACEMENT THERAPY): ICD-10-CM

## 2024-10-14 ENCOUNTER — MED REC SCAN ONLY (OUTPATIENT)
Dept: FAMILY MEDICINE CLINIC | Facility: CLINIC | Age: 74
End: 2024-10-14

## 2024-11-18 ENCOUNTER — TELEPHONE (OUTPATIENT)
Dept: FAMILY MEDICINE CLINIC | Facility: CLINIC | Age: 74
End: 2024-11-18

## 2024-11-18 NOTE — TELEPHONE ENCOUNTER
Medical Records Request received from [facility] for records from 1/1/23 - present.     Release original sent to [facility] on 11/18/24. Copy sent to scanning.

## 2024-11-19 ENCOUNTER — HOSPITAL ENCOUNTER (OUTPATIENT)
Dept: MAMMOGRAPHY | Facility: HOSPITAL | Age: 74
Discharge: HOME | End: 2024-11-19
Attending: STUDENT IN AN ORGANIZED HEALTH CARE EDUCATION/TRAINING PROGRAM
Payer: MEDICARE

## 2024-11-19 DIAGNOSIS — Z12.31 VISIT FOR SCREENING MAMMOGRAM: ICD-10-CM

## 2024-11-19 PROCEDURE — 77063 BREAST TOMOSYNTHESIS BI: CPT | Performed by: STUDENT IN AN ORGANIZED HEALTH CARE EDUCATION/TRAINING PROGRAM

## 2024-11-19 PROCEDURE — 77067 SCR MAMMO BI INCL CAD: CPT | Performed by: STUDENT IN AN ORGANIZED HEALTH CARE EDUCATION/TRAINING PROGRAM

## 2024-11-21 ENCOUNTER — PATIENT MESSAGE (OUTPATIENT)
Dept: FAMILY MEDICINE CLINIC | Facility: CLINIC | Age: 74
End: 2024-11-21

## 2024-11-21 NOTE — TELEPHONE ENCOUNTER
I recommend yearly screening until age 75, then after that it is up to the patient. May continue yearly screening or every other year etc after at 75. Thank you.

## 2024-12-04 DIAGNOSIS — N95.1 FLUSHING, MENOPAUSAL: ICD-10-CM

## 2024-12-05 RX ORDER — PAROXETINE 10 MG/1
10 TABLET, FILM COATED ORAL EVERY MORNING
Qty: 90 TABLET | Refills: 1 | Status: SHIPPED | OUTPATIENT
Start: 2024-12-05

## 2024-12-05 NOTE — TELEPHONE ENCOUNTER
Did not pass protocol  Last office visit 9/4/2024  Last refill was: , _[id number]  90 _tabs  Next appointment: n/a    Please sign pended medication if appropriate     Medication Quantity Refills Start End   PARoxetine 10 MG Oral Tab 90 tablet 0 9/4/2024 --   Sig:   Take 1 tablet (10 mg total) by mouth every morning.

## 2025-02-12 ENCOUNTER — LAB ENCOUNTER (OUTPATIENT)
Dept: LAB | Age: 75
End: 2025-02-12
Attending: STUDENT IN AN ORGANIZED HEALTH CARE EDUCATION/TRAINING PROGRAM
Payer: MEDICARE

## 2025-02-12 DIAGNOSIS — E83.52 HYPERCALCEMIA: ICD-10-CM

## 2025-02-12 DIAGNOSIS — E78.00 HYPERCHOLESTEREMIA: ICD-10-CM

## 2025-02-12 LAB
ALBUMIN SERPL-MCNC: 4.4 G/DL (ref 3.2–4.8)
ALBUMIN/GLOB SERPL: 1.5 {RATIO} (ref 1–2)
ALP LIVER SERPL-CCNC: 60 U/L
ALT SERPL-CCNC: 25 U/L
ANION GAP SERPL CALC-SCNC: 10 MMOL/L (ref 0–18)
AST SERPL-CCNC: 30 U/L (ref ?–34)
BILIRUB SERPL-MCNC: 0.5 MG/DL (ref 0.2–1.1)
BUN BLD-MCNC: 19 MG/DL (ref 9–23)
CALCIUM BLD-MCNC: 11.6 MG/DL (ref 8.7–10.6)
CHLORIDE SERPL-SCNC: 100 MMOL/L (ref 98–112)
CHOLEST SERPL-MCNC: 186 MG/DL (ref ?–200)
CO2 SERPL-SCNC: 29 MMOL/L (ref 21–32)
CREAT BLD-MCNC: 1.19 MG/DL
EGFRCR SERPLBLD CKD-EPI 2021: 48 ML/MIN/1.73M2 (ref 60–?)
FASTING PATIENT LIPID ANSWER: YES
FASTING STATUS PATIENT QL REPORTED: YES
GLOBULIN PLAS-MCNC: 3 G/DL (ref 2–3.5)
GLUCOSE BLD-MCNC: 93 MG/DL (ref 70–99)
HDLC SERPL-MCNC: 77 MG/DL (ref 40–59)
LDLC SERPL CALC-MCNC: 86 MG/DL (ref ?–100)
NONHDLC SERPL-MCNC: 109 MG/DL (ref ?–130)
OSMOLALITY SERPL CALC.SUM OF ELEC: 290 MOSM/KG (ref 275–295)
POTASSIUM SERPL-SCNC: 3.6 MMOL/L (ref 3.5–5.1)
PROT SERPL-MCNC: 7.4 G/DL (ref 5.7–8.2)
SODIUM SERPL-SCNC: 139 MMOL/L (ref 136–145)
TRIGL SERPL-MCNC: 132 MG/DL (ref 30–149)
VLDLC SERPL CALC-MCNC: 21 MG/DL (ref 0–30)

## 2025-02-12 PROCEDURE — 80061 LIPID PANEL: CPT

## 2025-02-12 PROCEDURE — 80053 COMPREHEN METABOLIC PANEL: CPT

## 2025-02-12 PROCEDURE — 36415 COLL VENOUS BLD VENIPUNCTURE: CPT

## 2025-02-13 ENCOUNTER — HOSPITAL ENCOUNTER (EMERGENCY)
Facility: HOSPITAL | Age: 75
Discharge: HOME | End: 2025-02-13
Attending: EMERGENCY MEDICINE
Payer: MEDICARE

## 2025-02-13 ENCOUNTER — PATIENT MESSAGE (OUTPATIENT)
Dept: FAMILY MEDICINE CLINIC | Facility: CLINIC | Age: 75
End: 2025-02-13

## 2025-02-13 ENCOUNTER — APPOINTMENT (OUTPATIENT)
Dept: CT IMAGING | Facility: HOSPITAL | Age: 75
End: 2025-02-13
Attending: EMERGENCY MEDICINE
Payer: MEDICARE

## 2025-02-13 VITALS
RESPIRATION RATE: 20 BRPM | HEART RATE: 51 BPM | OXYGEN SATURATION: 98 % | TEMPERATURE: 98 F | SYSTOLIC BLOOD PRESSURE: 161 MMHG | DIASTOLIC BLOOD PRESSURE: 87 MMHG

## 2025-02-13 DIAGNOSIS — I10 UNCONTROLLED HYPERTENSION: Primary | ICD-10-CM

## 2025-02-13 LAB
ALBUMIN SERPL-MCNC: 4.6 G/DL (ref 3.2–4.8)
ALBUMIN/GLOB SERPL: 1.3 {RATIO} (ref 1–2)
ALP LIVER SERPL-CCNC: 66 U/L
ALT SERPL-CCNC: 25 U/L
ANION GAP SERPL CALC-SCNC: 9 MMOL/L (ref 0–18)
AST SERPL-CCNC: 36 U/L (ref ?–34)
BASOPHILS # BLD AUTO: 0.05 X10(3) UL (ref 0–0.2)
BASOPHILS NFR BLD AUTO: 0.8 %
BILIRUB SERPL-MCNC: 0.5 MG/DL (ref 0.2–1.1)
BUN BLD-MCNC: 17 MG/DL (ref 9–23)
CALCIUM BLD-MCNC: 10.9 MG/DL (ref 8.7–10.6)
CHLORIDE SERPL-SCNC: 101 MMOL/L (ref 98–112)
CO2 SERPL-SCNC: 27 MMOL/L (ref 21–32)
CREAT BLD-MCNC: 1.13 MG/DL
EGFRCR SERPLBLD CKD-EPI 2021: 51 ML/MIN/1.73M2 (ref 60–?)
EOSINOPHIL # BLD AUTO: 0.19 X10(3) UL (ref 0–0.7)
EOSINOPHIL NFR BLD AUTO: 3 %
ERYTHROCYTE [DISTWIDTH] IN BLOOD BY AUTOMATED COUNT: 13.2 %
GLOBULIN PLAS-MCNC: 3.5 G/DL (ref 2–3.5)
GLUCOSE BLD-MCNC: 104 MG/DL (ref 70–99)
HCT VFR BLD AUTO: 40.5 %
HGB BLD-MCNC: 14 G/DL
IMM GRANULOCYTES # BLD AUTO: 0.01 X10(3) UL (ref 0–1)
IMM GRANULOCYTES NFR BLD: 0.2 %
LYMPHOCYTES # BLD AUTO: 1.89 X10(3) UL (ref 1–4)
LYMPHOCYTES NFR BLD AUTO: 30 %
MCH RBC QN AUTO: 29.9 PG (ref 26–34)
MCHC RBC AUTO-ENTMCNC: 34.6 G/DL (ref 31–37)
MCV RBC AUTO: 86.5 FL
MONOCYTES # BLD AUTO: 0.67 X10(3) UL (ref 0.1–1)
MONOCYTES NFR BLD AUTO: 10.7 %
NEUTROPHILS # BLD AUTO: 3.48 X10 (3) UL (ref 1.5–7.7)
NEUTROPHILS # BLD AUTO: 3.48 X10(3) UL (ref 1.5–7.7)
NEUTROPHILS NFR BLD AUTO: 55.3 %
OSMOLALITY SERPL CALC.SUM OF ELEC: 286 MOSM/KG (ref 275–295)
PLATELET # BLD AUTO: 265 10(3)UL (ref 150–450)
POTASSIUM SERPL-SCNC: 3.6 MMOL/L (ref 3.5–5.1)
PROT SERPL-MCNC: 8.1 G/DL (ref 5.7–8.2)
RBC # BLD AUTO: 4.68 X10(6)UL
SODIUM SERPL-SCNC: 137 MMOL/L (ref 136–145)
WBC # BLD AUTO: 6.3 X10(3) UL (ref 4–11)

## 2025-02-13 PROCEDURE — 70450 CT HEAD/BRAIN W/O DYE: CPT | Performed by: EMERGENCY MEDICINE

## 2025-02-13 PROCEDURE — 36415 COLL VENOUS BLD VENIPUNCTURE: CPT

## 2025-02-13 PROCEDURE — 93005 ELECTROCARDIOGRAM TRACING: CPT

## 2025-02-13 PROCEDURE — 85025 COMPLETE CBC W/AUTO DIFF WBC: CPT | Performed by: EMERGENCY MEDICINE

## 2025-02-13 PROCEDURE — 80053 COMPREHEN METABOLIC PANEL: CPT | Performed by: EMERGENCY MEDICINE

## 2025-02-13 PROCEDURE — 93010 ELECTROCARDIOGRAM REPORT: CPT

## 2025-02-13 PROCEDURE — 99285 EMERGENCY DEPT VISIT HI MDM: CPT

## 2025-02-13 RX ORDER — AMLODIPINE BESYLATE 5 MG/1
5 TABLET ORAL ONCE
Status: COMPLETED | OUTPATIENT
Start: 2025-02-13 | End: 2025-02-13

## 2025-02-13 NOTE — TELEPHONE ENCOUNTER
Calcium was actually improved compared to recent labs so this is good to see. Glad she has cardiology follow up already arranged. Can keep upcoming regularly scheduled appointment for April 2025. Thank you.

## 2025-02-13 NOTE — ED PROVIDER NOTES
Patient Seen in: [facility] Emergency Department      History     Chief Complaint   Patient presents with    Hypertension     Stated Complaint: elevated blood pressure    Subjective:   HPI      75-year-old female presents reporting elevated blood pressure over the last 4 or 5 days.  Also reports a very mild generalized aching headache.  No dizziness or vision changes.  She denies any chest pain or palpitations.  She says she takes amlodipine 5 mg, losartan 50 mg twice daily, HCTZ 12.5 mg for her blood pressure.  She has been having BP in the 200/1 100s.  She called her cardiologist office today and they told her she should go to the emergency room for evaluation.  No recent changes to her medications    Objective:     Past Medical History:    Pepper's esophagus    Diverticulosis of large intestine    Esophageal reflux    High blood pressure    High cholesterol    History of stomach ulcers    ISIS (obstructive sleep apnea)    AHI 6 Supine AHI 15 non-supine AHI 2 Sao2 Taj 73%, CPAP 9    Osteoarthritis    knees    Sleep apnea    GARRY (stress urinary incontinence, female)              Past Surgical History:   Procedure Laterality Date    Colonoscopy N/A 12/11/2017    Procedure: COLONOSCOPY;  Surgeon: Angelo Koch MD;  Location:  ENDOSCOPY    Hernia surgery  12/29/2021    robotic paraesophageal hernia repair w/ toupet fundoplication lysis of intra-abdominal adhesions    Hysterectomy  1982    total hysterectomy     Kristina localization wire 1 site right (cpt=19281)  2015    Other      balloon dilation right renal artery    Other      rt hand sx    Other surgical history  03/01/2021    Sling (Dr. Locke)     Repair of rectum,prolapse mucosa      Sinus surgery        rhinoplasty, and turbinate    Sling oper stres incontinence  03/01/2021    mid urethral sling (Obtyrx) with bladder neck biopsy                Social History     Socioeconomic History    Marital status:    Tobacco Use    Smoking status: Never     Smokeless tobacco: Never   Vaping Use    Vaping status: Never Used   Substance and Sexual Activity    Alcohol use: Yes     Comment: 1-2 glasses    Drug use: No    Sexual activity: Yes   Other Topics Concern    Caffeine Concern Yes     Comment: 1-2 cups coffee daily    Exercise Yes     Comment: water aerobics 3x/week    Seat Belt Yes                  Physical Exam     ED Triage Vitals [02/13/25 1105]   BP (!) 200/107   Pulse 72   Resp 15   Temp 98.1 °F (36.7 °C)   Temp src Temporal   SpO2 98 %   O2 Device None (Room air)       Current Vitals:   Vital Signs  BP: (!) 161/87  Pulse: 51  Resp: 20  Temp: 98.1 °F (36.7 °C)  Temp src: Temporal  MAP (mmHg): (!) 108    Oxygen Therapy  SpO2: 98 %  O2 Device: None (Room air)        Physical Exam  General:  Vitals as listed.  No acute distress   HEENT: Sclerae anicteric.  Conjunctivae show no pallor.  Oropharynx clear, mucous membranes moist   Neck: supple, no rigidity   Lungs: good air exchange and clear   Heart: regular rate rhythm and no murmur   Abdomen: Soft and nontender.  No abdominal masses.  No peritoneal signs   Extremities: no edema, normal peripheral pulses   Neuro: Alert oriented and nonfocal   Skin: no rashes or nodules    ED Course     Labs Reviewed   COMP METABOLIC PANEL (14) - Abnormal; Notable for the following components:       Result Value    Glucose 104 (*)     Creatinine 1.13 (*)     Calcium, Total 10.9 (*)     eGFR-Cr 51 (*)     AST 36 (*)     All other components within normal limits   CBC WITH DIFFERENTIAL WITH PLATELET   RAINBOW DRAW BLUE     EKG    Rate, intervals and axes as noted on EKG Report.  Rate: 72  Rhythm: Sinus Rhythm  Reading: No evidence of acute ischemia                CT BRAIN OR HEAD (CPT=70450)    Result Date: 2/13/2025  PROCEDURE:  CT BRAIN OR HEAD (32731)  COMPARISON:  EDDARIUS , CT, CTA BRAIN + CTA CAROTIDS (CPT=70496/49710), 2/15/2019, 12:33 PM.  LATRELL , CT, CT BRAIN OR HEAD (07103), 1/04/2019, 5:20 AM.  INDICATIONS:  elevated blood  pressure  TECHNIQUE:  Noncontrast CT scanning is performed through the brain. Dose reduction techniques were used. Dose information is transmitted to the ACR (American College of Radiology) NRDR (National Radiology Data Registry) which includes the Dose Index Registry.  PATIENT STATED HISTORY: (As transcribed by Technologist)  Patient is here for hypertension and headache.    FINDINGS:  The ventricles are normal in size and configuration. There are patchy areas of low attenuation in the periventricular and deep white matter which are nonspecific but most consistent with small vessel ischemic changes. There is no evidence of hemorrhage, mass, midline shift, or extra-axial fluid collection.  The visualized paranasal sinuses show no significant sinus disease. . No evidence of depressed skull fracture.  Empty sella            CONCLUSION: No acute intracranial findings.  Chronic microvascular ischemic changes.    LOCATION:  [place]   Dictated by (CST): Matt Crabtree MD on 2/13/2025 at 12:05 PM     Finalized by (CST): Matt Crabtree MD on 2/13/2025 at 12:10 PM             MDM      75-year-old female presents with elevated blood pressure.  On arrival blood pressure is 200/107.  She has documentation over the last 4 days with numerous blood pressures in this range.  She says she was sent by her cardiologist office for evaluation.  Denies chest pain but is reporting a generalized headache    Differential includes but is not limited to uncontrolled hypertension, intracranial hemorrhage, endorgan damage, a life threat.    CBC, CMP, EKG, CT brain ordered for further evaluation.    My independent interpretation of CT the brain is that there is no large intracranial hemorrhage.  Also reviewed radiology documentation which agrees with this finding.    Laboratory evaluation is unremarkable.  No significant NATE.  Discussed with cardiology who evaluated the patient at the bedside and currently her blood pressure is 161/87.   Given amlodipine 5 mg p.o. here.  Cardiology already has a follow-up appointment scheduled with the patient for tomorrow and they recommend she increase her amlodipine to 10 mg daily and her HCTZ to 25 mg daily.  She and I discussed that she will just continue to take her home medication at that dosing until she runs out of her current supply.  She is comfortable with this plan.  Should continue monitoring symptoms and return with worsening or any concerns.            Medical Decision Making      Disposition and Plan     Clinical Impression:  1. Uncontrolled hypertension         Disposition:  Discharge  2/13/2025  2:37 pm    Follow-up:  CHERELLE Jolly  [facility] [address] [phone]  Follow up  Follow-up tomorrow as scheduled          Medications Prescribed:  Current Discharge Medication List              Supplementary Documentation:

## 2025-02-13 NOTE — DISCHARGE INSTRUCTIONS
Cardiology recommends you increase your amlodipine 5 mg to amlodipine 10 mg (2 pills) once daily.    They also recommend you increase your hydrochlorothiazide 12.5 mg to hydrochlorothiazide 25 mg (2 pills) once daily

## 2025-02-14 ENCOUNTER — PATIENT OUTREACH (OUTPATIENT)
Dept: CASE MANAGEMENT | Age: 75
End: 2025-02-14

## 2025-02-14 LAB
ATRIAL RATE: 72 BPM
P AXIS: 35 DEGREES
P-R INTERVAL: 188 MS
Q-T INTERVAL: 386 MS
QRS DURATION: 86 MS
QTC CALCULATION (BEZET): 422 MS
R AXIS: -3 DEGREES
T AXIS: 39 DEGREES
VENTRICULAR RATE: 72 BPM

## 2025-02-14 NOTE — PROGRESS NOTES
Left message on mailbox for patient to call nurse care manager back for transitions of care call.  Nurse care  information [phone] included in message.           Disposition and Plan      Clinical Impression:  1. Uncontrolled hypertension         Cardiology recommends you increase your amlodipine 5 mg to amlodipine 10 mg (2 pills) once daily. They also recommend you increase your hydrochlorothiazide 12.5 mg to hydrochlorothiazide 25 mg (2 pills) once daily    Follow-up Appointments:     Follow-up:  CHERELLE Jolly  [facility] [address] [phone]  Follow up  Follow-up tomorrow as scheduled    Your appointments       Date & Time Appointment Department (Center)    Apr 02, 2025 8:00 AM CDT Exam - Established with Gabriela Arreola MD [facility], [address] [place] [address] ([place] [id number])

## 2025-02-14 NOTE — PROGRESS NOTES
Transitions of Care Navigation  Discharge Date: 25  Contact Date: 2025      Disposition and Plan      Clinical Impression:  1. Uncontrolled hypertension        Transitions of Care Assessment:  SYLVIA Initial Assessment    General:  Assessment completed with: Patient  Patient Subjective: Pt feeling better, since ER discharge--has increased Amlodipine and Hydrochlorothiazide, as directed. B/P this morning at 0650, pre-medication: 148/93. Pt then checked b/p again at 0855, after meds: 164/101, then waited  and re-checked at 0930: 151/93. Pt denies fever, chills, headache, vision changes, dizziness, nausea, vomiting, diarrhea, bleeding, irregular heartbeat or fast pulse, loss of vision, speech or strength or coordination in any body part, chest pain, pain radiating to jaw, arm, shoulder or back or shortness of breath at this time--speaking in full, clear sentences.  Chief Complaint: Clinical Impression:  1. Uncontrolled hypertension  Verify patient name and  with patient/ caregiver: Yes    Hospital Stay/Discharge:  Tell me what you understand of why you were in the hospital or emergency department: Patient reports c/o headache, hypertension since Monday.  Prior to leaving the hospital were your Discharge Instructions reviewed with you?: Yes  Did you receive a copy of your written Discharge Instructions?: Yes  What questions do you have about your Discharge Instructions?: none  Do you feel better or worse since you left the hospital or emergency department?: Better    Follow - Up Appointment:  Do you have a follow-up appointment?: Yes  Date: 25  Physician: Dr. Logan  Are there any barriers to getting to your follow-up appointment?: No    Home Health/DME:  Prior to leaving the hospital was Home Health (HH) arranged for you?: No     Prior to leaving the hospital or emergency department was Durable Medical Equipment (DME), medical supplies, or infusions arranged for you?: No  Are DME/medical  supply/infusions needs identified by staff during this assessment?: No     Medications/Diet:  Did any of your medications change, during or after your hospital stay or ED visit?: Yes  Do you have your new or updated medications?: Yes  Do you understand what your medications are for and possible side effects?: Yes  Are there any reasons that keep you from taking your medication as prescribed?: No  Any concerns about medication refills?: No    Were you given a different diet per your Discharge Instructions?: Yes  Diet Type: low sodium, limit caffeine  Reason: HTN  Are there any barriers to following that diet?: No     Questions/Concerns:  Do you have any questions or concerns that have not been discussed?: No     SYLVIA Follow-up Assessment    General:  Assessment completed with: Patient  Patient Subjective: Pt feeling better, since ER discharge--has increased Amlodipine and Hydrochlorothiazide, as directed. B/P this morning at 0650, pre-medication: 148/93. Pt then checked b/p again at 0855, after meds: 164/101, then waited  and re-checked at 0930: 151/93. Pt denies fever, chills, headache, vision changes, dizziness, nausea, vomiting, diarrhea, bleeding, irregular heartbeat or fast pulse, loss of vision, speech or strength or coordination in any body part, chest pain, pain radiating to jaw, arm, shoulder or back or shortness of breath at this time--speaking in full, clear sentences.  Chief Complaint: Clinical Impression:  1. Uncontrolled hypertension    Progress/Care Plan:  Is the patient progressing as planned?: Yes  Frequency/Follow Up Plan: Patient has met goals, no further outreach needed.     Nursing Interventions: Discussed diet, activity, medications and need for f/u visits. Pt confirms appt today with CHERELLE Logan. Pt declines SYLVIA/ER f/u with Dr. Dooley--aware PCP agreeable for pt to keep 4/02/25 appt, previously scheduled. Patient has met goals, no further outreach needed. Patient aware when to contact  PCP/specialists and when to seek emergency care. No further questions/concerns at this time.    Medications:  Medication Reconciliation:  I am aware of an inpatient discharge within the last 30 days.  The discharge medication list has been reconciled with the patient's current medication list and reviewed by me. See medication list for additions of new medication, and changes to current doses of medications and discontinued medications.  Current Outpatient Medications   Medication Sig Dispense Refill    PARoxetine 10 MG Oral Tab Take 1 tablet (10 mg total) by mouth every morning. 90 tablet 1    estradiol 1 MG Oral Tab Take 1 tablet (1 mg total) by mouth daily. 90 tablet 1    hydroCHLOROthiazide 12.5 MG Oral Tab Take 1 tablet (12.5 mg total) by mouth daily. 90 tablet 1    rosuvastatin 20 MG Oral Tab Take 1 tablet (20 mg total) by mouth nightly. 90 tablet 1    valACYclovir 500 MG Oral Tab Take 1 tablet (500 mg total) by mouth 2 (two) times daily. 12 tablet 2    losartan 50 MG Oral Tab Take 1 tablet (50 mg total) by mouth 2 (two) times daily.      amLODIPine 5 MG Oral Tab Take 1 tablet (5 mg total) by mouth daily. 30 tablet 11    pantoprazole 20 MG Oral Tab EC 1 tablet (20 mg total). Once daily, can take up to bid      Vitamin D3, Cholecalciferol, 25 MCG (1000 UT) Oral Tab Take 2 tablets (2,000 Units total) by mouth daily.      Multiple Vitamins-Minerals (EYE VITAMINS OR) Take 1 tablet by mouth daily.          Cardiology recommends you increase your amlodipine 5 mg to amlodipine 10 mg (2 pills) once daily. They also recommend you increase your hydrochlorothiazide 12.5 mg to hydrochlorothiazide 25 mg (2 pills) once daily       Follow-up Appointments:    Follow-up:  CHERELLE Lincoln charisma  [facility] [address] [phone]  Follow up  Follow-up tomorrow as scheduled  Your appointments       Date & Time Appointment Department (Center)    Apr 02, 2025 8:00 AM CDT Exam - Established with Gabriela Arreola  MD [facility], [address] [place] [address] ([place] [id number])              [place] [id number] [address] [place] [id number]  [phone]            Transitional Care Clinic  Was TCC Ordered: No    Primary Care Provider (If no TCC appointment)  Does patient already have a PCP appointment scheduled? No  Nurse Care Manager Attempted to schedule PCP office ER Follow-up appointment with patient   -If no appointment scheduled: Explain pt declines--prefers to f/u with cardiology only, at this time    Specialist  Does the patient have any other follow-up appointment(s) need to be scheduled? No     Book By Date: 2/20/2025

## 2025-02-19 ENCOUNTER — PATIENT MESSAGE (OUTPATIENT)
Dept: FAMILY MEDICINE CLINIC | Facility: CLINIC | Age: 75
End: 2025-02-19

## 2025-02-19 ENCOUNTER — OFFICE VISIT (OUTPATIENT)
Dept: FAMILY MEDICINE CLINIC | Facility: CLINIC | Age: 75
End: 2025-02-19
Payer: MEDICARE

## 2025-02-19 VITALS
DIASTOLIC BLOOD PRESSURE: 82 MMHG | TEMPERATURE: 97 F | SYSTOLIC BLOOD PRESSURE: 132 MMHG | HEART RATE: 72 BPM | WEIGHT: 196 LBS | BODY MASS INDEX: 33.46 KG/M2 | RESPIRATION RATE: 18 BRPM | HEIGHT: 64 IN

## 2025-02-19 DIAGNOSIS — H61.21 IMPACTED CERUMEN, RIGHT EAR: ICD-10-CM

## 2025-02-19 DIAGNOSIS — I15.1 HYPERTENSION SECONDARY TO OTHER RENAL DISORDERS: ICD-10-CM

## 2025-02-19 DIAGNOSIS — I77.3 FIBROMUSCULAR DYSPLASIA OF RIGHT RENAL ARTERY: ICD-10-CM

## 2025-02-19 DIAGNOSIS — H60.501 ACUTE OTITIS EXTERNA OF RIGHT EAR, UNSPECIFIED TYPE: Primary | ICD-10-CM

## 2025-02-19 PROCEDURE — 99214 OFFICE O/P EST MOD 30 MIN: CPT | Performed by: STUDENT IN AN ORGANIZED HEALTH CARE EDUCATION/TRAINING PROGRAM

## 2025-02-19 PROCEDURE — G2211 COMPLEX E/M VISIT ADD ON: HCPCS | Performed by: STUDENT IN AN ORGANIZED HEALTH CARE EDUCATION/TRAINING PROGRAM

## 2025-02-19 RX ORDER — CIPROFLOXACIN AND DEXAMETHASONE 3; 1 MG/ML; MG/ML
4 SUSPENSION/ DROPS AURICULAR (OTIC) 2 TIMES DAILY
Qty: 7.5 ML | Refills: 0 | Status: SHIPPED | OUTPATIENT
Start: 2025-02-19 | End: 2025-02-19

## 2025-02-19 RX ORDER — NEOMYCIN SULFATE, POLYMYXIN B SULFATE AND HYDROCORTISONE 10; 3.5; 1 MG/ML; MG/ML; [USP'U]/ML
4 SUSPENSION/ DROPS AURICULAR (OTIC) 4 TIMES DAILY
Qty: 10 ML | Refills: 0 | Status: SHIPPED | OUTPATIENT
Start: 2025-02-19

## 2025-02-19 RX ORDER — HYDRALAZINE HYDROCHLORIDE 25 MG/1
TABLET, FILM COATED ORAL
COMMUNITY
Start: 2025-02-14

## 2025-02-19 NOTE — PROGRESS NOTES
[facility] Note  25    Chief Complaint   Patient presents with    Ear Pain     Right side.  X 2 weeks.  For the past 6 days pain comes and goes.  Comes on around 4 a.m. as a sharp pain and slowly goes away as she moves around when she gets up     HPI:   Kirby Valerio is a 75 year old female who presents for follow up.    Was in ER for headache and high blood pressure. Saw cardiology and had medication adjustments. Did labs this morning with [facility]. Has renal ultrasound in 2025. In the process of adjusting medications.    Two weeks ago, stuck a qtip in her ear and hit the eardrum and it hurt. Pain improved, but it has returned intermittently. Flares at 4am then randomly in the middle of the day randomly. Has not put anything in the ear since. No drainage noted. No fevers. Hearing seems fine.    No congestion, sneezing, coughing, rhinorrhea, sore throat.     Was in [place] for  for her sister-in-law's.     To see Dr. Logan at the end of 2025.    Does water aerobics.     Will be putting house back on the market to move to [place].     Wt Readings from Last 6 Encounters:   25 196 lb (88.9 kg)   24 193 lb 6.4 oz (87.7 kg)   24 191 lb 6.4 oz (86.8 kg)   23 185 lb (83.9 kg)   23 189 lb 4 oz (85.8 kg)   23 184 lb (83.5 kg)     Past Medical History:    Pepper's esophagus    Diverticulosis of large intestine    Esophageal reflux    High blood pressure    High cholesterol    History of stomach ulcers    ISIS (obstructive sleep apnea)    AHI 6 Supine AHI 15 non-supine AHI 2 Sao2 Taj 73%, CPAP 9    Osteoarthritis    knees    Sleep apnea    GARRY (stress urinary incontinence, female)     Past Surgical History:   Procedure Laterality Date    Colonoscopy N/A 2017    Procedure: COLONOSCOPY;  Surgeon: Angelo Koch MD;  Location:  ENDOSCOPY    Hernia surgery  2021    robotic paraesophageal hernia repair w/ toupet  fundoplication lysis of intra-abdominal adhesions    Hysterectomy  1982    total hysterectomy     Kristina localization wire 1 site right (cpt=19281)  2015    Other      balloon dilation right renal artery    Other      rt hand sx    Other surgical history  03/01/2021    Sling (Dr. Locke)     Repair of rectum,prolapse mucosa      Sinus surgery        rhinoplasty, and turbinate    Sling oper stres incontinence  03/01/2021    mid urethral sling (Obtyrx) with bladder neck biopsy     Allergies[1]   hydrALAZINE 25 MG Oral Tab Take 1 tablet orally every 8 hours as needed for Bp greater 160/100.      ciprofloxacin-dexamethasone 0.3-0.1 % Otic Suspension Place 4 drops into the right ear 2 (two) times daily. 7.5 mL 0    PARoxetine 10 MG Oral Tab Take 1 tablet (10 mg total) by mouth every morning. 90 tablet 1    estradiol 1 MG Oral Tab Take 1 tablet (1 mg total) by mouth daily. 90 tablet 1    hydroCHLOROthiazide 12.5 MG Oral Tab Take 1 tablet (12.5 mg total) by mouth daily. 90 tablet 1    rosuvastatin 20 MG Oral Tab Take 1 tablet (20 mg total) by mouth nightly. 90 tablet 1    valACYclovir 500 MG Oral Tab Take 1 tablet (500 mg total) by mouth 2 (two) times daily. 12 tablet 2    losartan 50 MG Oral Tab Take 1 tablet (50 mg total) by mouth 2 (two) times daily.      amLODIPine 5 MG Oral Tab Take 1 tablet (5 mg total) by mouth daily. 30 tablet 11    pantoprazole 20 MG Oral Tab EC 1 tablet (20 mg total). Once daily, can take up to bid      Vitamin D3, Cholecalciferol, 25 MCG (1000 UT) Oral Tab Take 2 tablets (2,000 Units total) by mouth daily.      Multiple Vitamins-Minerals (EYE VITAMINS OR) Take 1 tablet by mouth daily.       Social History     Socioeconomic History    Marital status:    Tobacco Use    Smoking status: Never    Smokeless tobacco: Never   Vaping Use    Vaping status: Never Used   Substance and Sexual Activity    Alcohol use: Yes     Comment: 1-2 glasses    Drug use: No    Sexual activity: Yes   Other Topics Concern     Caffeine Concern Yes     Comment: 1-2 cups coffee daily    Exercise Yes     Comment: water aerobics 3x/week    Seat Belt Yes     Counseling given: Not Answered    Family History   Problem Relation Age of Onset    Cancer Mother         Brain and lung cancer    Heart Disorder Father         MI    Heart Disorder Brother     Heart Disorder Maternal Grandmother     Heart Disorder Maternal Grandfather     Heart Disorder Paternal Grandfather         MI    Breast Cancer Maternal Aunt 75    Ovarian Cancer Paternal Aunt      Family Status   Relation Status    Mo     Fa     Bro  at age 69        heart failure    Bro  at age 33        GSW    MGMA (Not Specified)    MGFA     PGFA     Mat Aunt     Pat Aunt         REVIEW OF SYSTEMS:   See HPI    EXAM:   /82 (BP Location: Left arm, Patient Position: Sitting, Cuff Size: large)   Pulse 72   Temp 97 °F (36.1 °C) (Temporal)   Resp 18   Ht 5' 4\" (1.626 m)   Wt 196 lb (88.9 kg)   BMI 33.64 kg/m²  Estimated body mass index is 33.64 kg/m² as calculated from the following:    Height as of this encounter: 5' 4\" (1.626 m).    Weight as of this encounter: 196 lb (88.9 kg).   Vital signs reviewed. Appears stated age, well groomed.  Physical Exam:  GEN:  Patient is alert and oriented x3, no apparent distress  HEAD:  Normocephalic, atraumatic  HEENT:  no scleral icterus, conjunctivae clear bilaterally, left ear canal and TM clear, right ear canal with impacted cerumen at 4-5o'clock with erythema posterior to the cerumen at 1-3o'clock but partially obscured. Attempted to removed cerumen with lighted curette but patient did not tolerate due to the pain. + tenderness of ear canal just distal to the cerumen; visualized portion of right TM clear  LUNGS: clear to auscultation bilaterally, no rales/rhonchi/wheezing  HEART:  Regular rate and rhythm, normal S1/S2, no murmurs, rubs or gallops  EXTREMITIES:  Moves all  extremities well  NEURO:  CN 2 - 12 grossly intact, gait normal      ASSESSMENT AND PLAN:   1. Acute otitis externa of right ear, unspecified type  2. Impacted cerumen, right ear  Patient presents for ear pain found to have impacted cerumen with otitis externa  - will start ciprodex drops  - monitor symptoms  - avoid getting ear wet in the pool  - follow up if worsening or no improvement  - ciprofloxacin-dexamethasone 0.3-0.1 % Otic Suspension; Place 4 drops into the right ear 2 (two) times daily.  Dispense: 7.5 mL; Refill: 0    3. Fibromuscular dysplasia of right renal artery  Has upcoming US with cardiology    4. Hypertension secondary to other renal disorders  Improving per cardiology medication adjustments, appreciate evaluation and recommendations      Meds & Refills for this Visit:  Requested Prescriptions     Signed Prescriptions Disp Refills    ciprofloxacin-dexamethasone 0.3-0.1 % Otic Suspension 7.5 mL 0     Sig: Place 4 drops into the right ear 2 (two) times daily.       Start Taking               ciprofloxacin-dexamethasone 0.3-0.1 % Otic Suspension Place 4 drops into the right ear 2 (two) times daily.            Health Maintenance:  Health Maintenance Due   Topic Date Due    Zoster Vaccines (1 of 2) Never done    COVID-19 Vaccine (6 - 2024-25 season) 09/01/2024    Annual Depression Screening  01/01/2025       Patient/Caregiver Education: There are no barriers to learning. Medical education done.   Outcome: Patient verbalizes understanding. Patient is notified to call with any questions, complications, allergies, or worsening or changing symptoms.  Patient is to call with any side effects or complications from the treatments as a result of today.     Problem List:  Patient Active Problem List   Diagnosis    Other screening mammogram    Benign essential HTN    Primary osteoarthritis of right knee    Acute medial meniscus tear of right knee, initial encounter    Hypercholesteremia    Flushing, menopausal     Pepper's esophagus without dysplasia    Renal insufficiency    ISIS on CPAP    Vertigo    Bradycardia    Dyslipidemia    Other constipation    Poor short term memory    Anxiety    Inadequate vitamin B6 intake    GARRY (stress urinary incontinence, female)    Paraesophageal hernia    Primary hypertension    CKD (chronic kidney disease) stage 3, GFR 30-59 ml/min (HCC)    Fibromuscular dysplasia of right renal artery    Recurrent major depressive disorder, in full remission    NSVT (nonsustained ventricular tachycardia) (HCC)    Bilateral primary osteoarthritis of knee    Recurrent cold sores    Vitamin D deficiency    Prediabetes       Return if symptoms worsen or fail to improve.      Gabriela Arreola MD  [facility]  02/19/25      Please note that portions of this note may have been completed with a voice recognition program. Efforts were made to edit the dictations but occasionally words are mis-transcribed. Thank you for your understanding.    The 21st Century Cures Act makes medical notes like these available to patients in the interest of transparency. Please be advised this is a medical document. Medical documents are intended to carry relevant information, facts as evident, and the clinical opinion of the practitioner. The medical note is intended as peer to peer communication and may appear blunt or direct. It is written in medical language and may contain abbreviations or verbiage that are unfamiliar. If there are any questions or concerns please contact the provider for clarification.              [1]   Allergies  Allergen Reactions    Adhesive Tape HIVES    Aspirin BLEEDING     Caused ulcer in the stomach     Iodinated Diagnostic Agents [Radiology Contrast Iodinated Dyes] HIVES, ITCHING and NAUSEA ONLY     Tolerated MRI w contrast 1/2019 with premedication    Atorvastatin MYALGIA     Fatigue     Shellfish-Derived Products HIVES    Sulfa Antibiotics HIVES

## 2025-02-27 DIAGNOSIS — N95.1 FLUSHING, MENOPAUSAL: ICD-10-CM

## 2025-02-27 DIAGNOSIS — I10 PRIMARY HYPERTENSION: ICD-10-CM

## 2025-02-27 DIAGNOSIS — E78.00 HYPERCHOLESTEREMIA: ICD-10-CM

## 2025-02-27 DIAGNOSIS — Z78.0 POSTMENOPAUSAL: ICD-10-CM

## 2025-02-28 RX ORDER — HYDROCHLOROTHIAZIDE 12.5 MG/1
12.5 TABLET ORAL DAILY
Qty: 90 TABLET | Refills: 0 | OUTPATIENT
Start: 2025-02-28

## 2025-02-28 RX ORDER — ROSUVASTATIN CALCIUM 20 MG/1
20 TABLET, COATED ORAL NIGHTLY
Qty: 30 TABLET | Refills: 1 | Status: SHIPPED | OUTPATIENT
Start: 2025-02-28

## 2025-02-28 RX ORDER — HYDROCHLOROTHIAZIDE 12.5 MG/1
12.5 TABLET ORAL DAILY
Qty: 30 TABLET | Refills: 1 | Status: SHIPPED | OUTPATIENT
Start: 2025-02-28

## 2025-02-28 RX ORDER — ROSUVASTATIN CALCIUM 20 MG/1
20 TABLET, COATED ORAL NIGHTLY
Qty: 90 TABLET | Refills: 0 | OUTPATIENT
Start: 2025-02-28

## 2025-02-28 RX ORDER — ESTRADIOL 1 MG/1
1 TABLET ORAL DAILY
Qty: 90 TABLET | Refills: 0 | Status: SHIPPED | OUTPATIENT
Start: 2025-02-28

## 2025-02-28 NOTE — TELEPHONE ENCOUNTER
Last Office Visit: 09/04/2024    Last Refill:   Medication Quantity Refills Start End   estradiol 1 MG Oral Tab 90 tablet 1 9/4/2024 --     Return to Clinic: [facility]al 04/02/2025    Protocol: failed     Refill pended. Please approve if okay. Thank you.

## 2025-02-28 NOTE — TELEPHONE ENCOUNTER
Last Office Visit: 9/4/24  Last Refill: 9/4/24  Return to Clinic: 6 months   Protocol: passed  NOV: 4/2/25  Requested Prescriptions     Pending Prescriptions Disp Refills    hydroCHLOROthiazide 12.5 MG Oral Tab 30 tablet 1     Sig: Take 1 tablet (12.5 mg total) by mouth daily.         Please approve if appropriate.     Thank you!

## 2025-02-28 NOTE — TELEPHONE ENCOUNTER
Last Office Visit: 9/4/24  Last Refill: 9/4/24  Return to Clinic: 6 months   Protocol: passed  NOV: 4/2/25  Requested Prescriptions     Pending Prescriptions Disp Refills    rosuvastatin 20 MG Oral Tab 30 tablet 1     Sig: Take 1 tablet (20 mg total) by mouth nightly.

## 2025-04-02 ENCOUNTER — OFFICE VISIT (OUTPATIENT)
Dept: FAMILY MEDICINE CLINIC | Facility: CLINIC | Age: 75
End: 2025-04-02
Payer: MEDICARE

## 2025-04-02 VITALS
HEIGHT: 64 IN | BODY MASS INDEX: 33.43 KG/M2 | SYSTOLIC BLOOD PRESSURE: 108 MMHG | DIASTOLIC BLOOD PRESSURE: 62 MMHG | TEMPERATURE: 97 F | WEIGHT: 195.81 LBS | HEART RATE: 59 BPM | RESPIRATION RATE: 18 BRPM

## 2025-04-02 DIAGNOSIS — E83.52 SERUM CALCIUM ELEVATED: ICD-10-CM

## 2025-04-02 DIAGNOSIS — I77.3 FIBROMUSCULAR DYSPLASIA OF RIGHT RENAL ARTERY: ICD-10-CM

## 2025-04-02 DIAGNOSIS — I47.29 NSVT (NONSUSTAINED VENTRICULAR TACHYCARDIA) (HCC): ICD-10-CM

## 2025-04-02 DIAGNOSIS — B00.1 RECURRENT COLD SORES: ICD-10-CM

## 2025-04-02 DIAGNOSIS — N18.32 STAGE 3B CHRONIC KIDNEY DISEASE (HCC): ICD-10-CM

## 2025-04-02 DIAGNOSIS — E78.00 HYPERCHOLESTEREMIA: Primary | ICD-10-CM

## 2025-04-02 DIAGNOSIS — I10 PRIMARY HYPERTENSION: ICD-10-CM

## 2025-04-02 PROCEDURE — G2211 COMPLEX E/M VISIT ADD ON: HCPCS | Performed by: STUDENT IN AN ORGANIZED HEALTH CARE EDUCATION/TRAINING PROGRAM

## 2025-04-02 PROCEDURE — 99214 OFFICE O/P EST MOD 30 MIN: CPT | Performed by: STUDENT IN AN ORGANIZED HEALTH CARE EDUCATION/TRAINING PROGRAM

## 2025-04-02 RX ORDER — ROSUVASTATIN CALCIUM 20 MG/1
20 TABLET, COATED ORAL NIGHTLY
Qty: 90 TABLET | Refills: 1 | Status: SHIPPED | OUTPATIENT
Start: 2025-04-02

## 2025-04-02 RX ORDER — AMLODIPINE BESYLATE 10 MG/1
10 TABLET ORAL DAILY
COMMUNITY
Start: 2025-03-04

## 2025-04-02 RX ORDER — VALACYCLOVIR HYDROCHLORIDE 500 MG/1
500 TABLET, FILM COATED ORAL 2 TIMES DAILY
Qty: 12 TABLET | Refills: 2 | Status: CANCELLED | OUTPATIENT
Start: 2025-04-02

## 2025-04-02 RX ORDER — VALSARTAN 160 MG/1
160 TABLET ORAL 2 TIMES DAILY
COMMUNITY
Start: 2025-03-25

## 2025-04-02 RX ORDER — HYDROCHLOROTHIAZIDE 25 MG/1
TABLET ORAL
COMMUNITY
Start: 2025-02-13

## 2025-04-02 NOTE — ASSESSMENT & PLAN NOTE
Following with cardiology, doing well on current regimen        Other (Free Text): Per Dr. Yoon okay to do 2-3 lipomas at a time on the arms. Scalp to be done alone.\\nLipoma sizes see pictures for number locations \\n1) [id number]             7) 6cgo4ws\\n2) 4qdn1pp             8) 9uie2zb \\n3) 2.5cmx2.5cm       9) 2.1swk1xg\\n4) 2cmx2.5cm         10) 3cmx2.5cm\\n5) 2frx6ca             11) 8hpd2bs\\n6) 1.5cmx1.5cm Note Text (......Xxx Chief Complaint.): This diagnosis correlates with the Detail Level: Detailed Render Risk Assessment In Note?: no

## 2025-04-02 NOTE — ASSESSMENT & PLAN NOTE
Pt presents for follow up of HTN  - no red flag symptoms  - BP controlled  - continue current regimen  - RTC 6mo or sooner if needed

## 2025-04-02 NOTE — ASSESSMENT & PLAN NOTE
Patient presents for follow up of HLD  - no side effects of medications  - will continue current regimen  - low fat diet and exercise  - follow up in 6mo or sooner if needed  Orders:    rosuvastatin 20 MG Oral Tab; Take 1 tablet (20 mg total) by mouth nightly.

## 2025-04-02 NOTE — PROGRESS NOTES
formerly [facility] Note  04/02/25    Chief Complaint   Patient presents with    Medication Follow-Up     HPI:   Kirby Valerio is a 75 year old female who presents for follow up.    Patient presents for follow up of hyperlipidemia. Patient has been taking medications as prescribed, no muscle aches noted. Working on diet and exercise.   Cholesterol Meds: rosuvastatin Tabs - 20 MG   Cholesterol: 186, done on 2/12/2025.  HDL Cholesterol: 77, done on 2/12/2025.  LDL Cholesterol: 86, done on 2/12/2025.  TriGlycerides 132, done on 2/12/2025.    Last ALT was 25 done on 2/13/2025.  Last AST was 36 done on 2/13/2025.    Patient is following with cardiology for HTN. Stopped losartan now on valsartan 160mg BID. BP in the morning 129/82, 119/77. This morning with storms 133/77.  Was advised to take hydralazine as needed for elevated pressures. Amlodipine was increased and patient has worsening reflux so was taking more tums. Now on pantoprazole 40mg instead of 20mg, now taking it around 1-2pm. Feeling better now. Can have coffee again. Has not had alcohol since 2/2025, not craving it.     Had updated renal artery US and it was good.     Trying to get back into exercise again. Able to do water aerobics, treadmill, bike, leg exercises, arm exercise. Doing chair yoga now.      Last Cr was 1.13 done on 2/13/2025.Last eGFR was 51 on 2/13/2025.    Patient presents for follow up of mood and menopausal flushing. Doing well on paroxetine 10mg daily. Mood is good. Doing well on estradiol as well.     Patient is taking valtrex as needed for cold sore flare ups. Has not needed this in a long time.     Ear is better.     To see realtor end of April.       Wt Readings from Last 6 Encounters:   04/02/25 195 lb 12.8 oz (88.8 kg)   02/19/25 196 lb (88.9 kg)   09/04/24 193 lb 6.4 oz (87.7 kg)   02/16/24 191 lb 6.4 oz (86.8 kg)   12/28/23 185 lb (83.9 kg)   11/02/23 189 lb 4 oz (85.8 kg)       Past Medical History:     Pepper's esophagus    Diverticulosis of large intestine    Esophageal reflux    High blood pressure    High cholesterol    History of stomach ulcers    ISIS (obstructive sleep apnea)    AHI 6 Supine AHI 15 non-supine AHI 2 Sao2 Taj 73%, CPAP 9    Osteoarthritis    knees    Sleep apnea    GARRY (stress urinary incontinence, female)     Past Surgical History:   Procedure Laterality Date    Colonoscopy N/A 12/11/2017    Procedure: COLONOSCOPY;  Surgeon: Angelo Koch MD;  Location:  ENDOSCOPY    Hernia surgery  12/29/2021    robotic paraesophageal hernia repair w/ toupet fundoplication lysis of intra-abdominal adhesions    Hysterectomy  1982    total hysterectomy     Kristina localization wire 1 site right (cpt=19281)  2015    Other      balloon dilation right renal artery    Other      rt hand sx    Other surgical history  03/01/2021    Sling (Dr. Locke)     Repair of rectum,prolapse mucosa      Sinus surgery        rhinoplasty, and turbinate    Sling oper stres incontinence  03/01/2021    mid urethral sling (Obtyrx) with bladder neck biopsy     Allergies[1]   amLODIPine 10 MG Oral Tab Take 1 tablet (10 mg total) by mouth daily.      hydroCHLOROthiazide 25 MG Oral Tab       valsartan 160 MG Oral Tab Take 1 tablet (160 mg total) by mouth 2 (two) times daily.      estradiol 1 MG Oral Tab Take 1 tablet (1 mg total) by mouth daily. 90 tablet 0    rosuvastatin 20 MG Oral Tab Take 1 tablet (20 mg total) by mouth nightly. 30 tablet 1    hydrALAZINE 25 MG Oral Tab Take 1 tablet orally every 8 hours as needed for Bp greater 160/100.      PARoxetine 10 MG Oral Tab Take 1 tablet (10 mg total) by mouth every morning. 90 tablet 1    valACYclovir 500 MG Oral Tab Take 1 tablet (500 mg total) by mouth 2 (two) times daily. 12 tablet 2    Vitamin D3, Cholecalciferol, 25 MCG (1000 UT) Oral Tab Take 2 tablets (2,000 Units total) by mouth daily.      Multiple Vitamins-Minerals (EYE VITAMINS OR) Take 1 tablet by mouth daily.       Social  History     Socioeconomic History    Marital status:    Tobacco Use    Smoking status: Never    Smokeless tobacco: Never   Vaping Use    Vaping status: Never Used   Substance and Sexual Activity    Alcohol use: Yes     Comment: 1-2 glasses    Drug use: No    Sexual activity: Yes   Other Topics Concern    Caffeine Concern Yes     Comment: 1-2 cups coffee daily    Exercise Yes     Comment: water aerobics 3x/week    Seat Belt Yes     Counseling given: Not Answered    Family History   Problem Relation Age of Onset    Cancer Mother         Brain and lung cancer    Heart Disorder Father         MI    Heart Disorder Brother     Heart Disorder Maternal Grandmother     Heart Disorder Maternal Grandfather     Heart Disorder Paternal Grandfather         MI    Breast Cancer Maternal Aunt 75    Ovarian Cancer Paternal Aunt      Family Status   Relation Status    Mo     Fa     Bro  at age 69        heart failure    Bro  at age 33        GSW    MGMA (Not Specified)    MGFA     PGFA     Mat Aunt     Pat Aunt         REVIEW OF SYSTEMS:   See HPI    EXAM:   /62   Pulse 59   Temp 97.3 °F (36.3 °C) (Temporal)   Resp 18   Ht 5' 4\" (1.626 m)   Wt 195 lb 12.8 oz (88.8 kg)   BMI 33.61 kg/m²  Estimated body mass index is 33.61 kg/m² as calculated from the following:    Height as of this encounter: 5' 4\" (1.626 m).    Weight as of this encounter: 195 lb 12.8 oz (88.8 kg).   Vital signs reviewed. Appears stated age, well groomed.  Physical Exam:  GEN:  Patient is alert and oriented x3, no apparent distress  HEAD:  Normocephalic, atraumatic  HEENT:  no scleral icterus, conjunctivae clear bilaterally, EOMI, PERRLA, OP clear  LUNGS: clear to auscultation bilaterally, no rales/rhonchi/wheezing  HEART:  Regular rate and rhythm, normal S1/S2, no murmurs, rubs or gallops  EXTREMITIES:  Moves all extremities well  NEURO:  CN 2 - 12 grossly intact, gait  normal      ASSESSMENT AND PLAN:     Assessment & Plan  Hypercholesteremia  Patient presents for follow up of HLD  - no side effects of medications  - will continue current regimen  - low fat diet and exercise  - follow up in 6mo or sooner if needed  Orders:    rosuvastatin 20 MG Oral Tab; Take 1 tablet (20 mg total) by mouth nightly.    Primary hypertension  Pt presents for follow up of HTN  - no red flag symptoms  - BP controlled  - continue current regimen  - RTC 6mo or sooner if needed       Fibromuscular dysplasia of right renal artery  BP control as above       NSVT (nonsustained ventricular tachycardia) (HCC)  Following with cardiology, doing well on current regimen       Stage 3b chronic kidney disease (HCC)  Stable will follow labs       Recurrent cold sores  Valtrex 500mg BID at the start of an outbreak for 5 days       Serum calcium elevated  Recheck in about 4 weeks  Orders:    Comp Metabolic Panel (14) [E]; Future            Meds & Refills for this Visit:  Requested Prescriptions      No prescriptions requested or ordered in this encounter       Stop Taking                losartan 50 MG Oral Tab    Take 1 tablet (50 mg total) by mouth 2 (two) times daily.     pantoprazole 20 MG Oral Tab EC    1 tablet (20 mg total). Once daily, can take up to bid     neomycin-polymyxin-hydrocortisone 3.5-40143-7 Otic Suspension    Place 4 drops into the right ear 4 (four) times daily.            Health Maintenance:  Health Maintenance Due   Topic Date Due    Zoster Vaccines (1 of 2) Never done    COVID-19 Vaccine (6 - 2024-25 season) 09/01/2024    Annual Depression Screening  01/01/2025       Patient/Caregiver Education: There are no barriers to learning. Medical education done.   Outcome: Patient verbalizes understanding. Patient is notified to call with any questions, complications, allergies, or worsening or changing symptoms.  Patient is to call with any side effects or complications from the treatments as a result  of today.     Problem List:  Patient Active Problem List   Diagnosis    Other screening mammogram    Benign essential HTN    Primary osteoarthritis of right knee    Acute medial meniscus tear of right knee, initial encounter    Hypercholesteremia    Flushing, menopausal    Pepper's esophagus without dysplasia    Renal insufficiency    ISIS on CPAP    Vertigo    Bradycardia    Dyslipidemia    Other constipation    Poor short term memory    Anxiety    Inadequate vitamin B6 intake    GARRY (stress urinary incontinence, female)    Paraesophageal hernia    Primary hypertension    CKD (chronic kidney disease) stage 3, GFR 30-59 ml/min (HCC)    Fibromuscular dysplasia of right renal artery    Recurrent major depressive disorder, in full remission    NSVT (nonsustained ventricular tachycardia) ([facility])    Bilateral primary osteoarthritis of knee    Recurrent cold sores    Vitamin D deficiency    Prediabetes       No follow-ups on file.      Gabriela Arreola MD  [facility]  04/02/25      Please note that portions of this note may have been completed with a voice recognition program. Efforts were made to edit the dictations but occasionally words are mis-transcribed. Thank you for your understanding.    The 21st Century Cures Act makes medical notes like these available to patients in the interest of transparency. Please be advised this is a medical document. Medical documents are intended to carry relevant information, facts as evident, and the clinical opinion of the practitioner. The medical note is intended as peer to peer communication and may appear blunt or direct. It is written in medical language and may contain abbreviations or verbiage that are unfamiliar. If there are any questions or concerns please contact the provider for clarification.              [1]   Allergies  Allergen Reactions    Adhesive Tape HIVES    Aspirin BLEEDING     Caused ulcer in the stomach     Iodinated  Diagnostic Agents [Radiology Contrast Iodinated Dyes] HIVES, ITCHING and NAUSEA ONLY     Tolerated MRI w contrast 1/2019 with premedication    Atorvastatin MYALGIA     Fatigue     Shellfish-Derived Products HIVES    Sulfa Antibiotics HIVES

## 2025-04-02 NOTE — PATIENT INSTRUCTIONS
Refill policies:      Allow 3 business days for refills; controlled substances may take longer.  Contact your pharmacy at least 5-7 business days prior to running out of medication and have them send an electronic request or submit through the \"request refill\" option thru your [employer] account. No need to do both, as multiple requests will create an automated [employer] message to notify of a denial for one of the duplicated requests, causing you undue confusion.   Refills are NOT addressed on weekends; covering physicians do not authorize routine medications on weekends.  No narcotics or controlled substances are refilled after noon on Fridays or by on call physicians.  By law, narcotics cannot be faxed or phoned into your pharmacy.  If your prescription is due for a refill, you may be due for a follow up appointment. Please call our office at [phone] to make an appointment or schedule an appointment via [employer].  To best provide you care, patients receiving routine medications need to be seen at least twice a year. Patients receiving narcotic/controlled substance medications need to be seen at least once every 3 months.  In the event that your preferred pharmacy does not have the requested medication in stock (ie Backordered), it is your responsibility to find another pharmacy that has the requested medication available. We will gladly send a new prescription to that pharmacy at your request.  controlled substances may not be able to be filled out of state due to license restrictions.  If you have a planned trip, it's best to call your pharmacy at least 5-7 business days to prevent any delays in your medication refill.    Scheduling Tests:    If your physician has ordered radiology tests such as MRI or CT scans, please contact Central Scheduling at [phone] right away to schedule the test.  Once scheduled, the [facility] Centralized Referral Team will work with your insurance carrier to obtain pre-certification or  prior authorization.  Depending on your insurance carrier, approval may take 3-10 days.  It is highly recommended patients assure they have received an authorization before having a test performed.  If test is done without insurance authorization, patient may be responsible for the entire amount billed.      Precertification and Prior Authorizations:  If your physician has recommended that you have a procedure or additional testing performed the [facility] Centralized Referral Team will contact your insurance carrier to obtain pre-certification or prior authorization.    You are strongly encouraged to contact your insurance carrier to verify that your procedure/test has been approved and is a COVERED benefit.  Although the [facility] Centralized Referral Team does its due diligence, the insurance carrier gives the disclaimer that \"Although the procedure is authorized, this does not guarantee payment.\"    Ultimately the patient is responsible for payment.   Thank you for your understanding in this matter.  Paperwork Completion:  If you require FMLA or disability paperwork for your recovery, please make sure to either drop it off or have it faxed to our office at [phone]. Be sure the form has your name and date of birth on it.  The form will be faxed to our Forms Department and they will complete it for you.  There is a 25$ fee for all forms that need to be filled out.  Please be aware there is a 10-14 day turnaround time.  You will need to sign a release of information (KI) form if your paperwork does not come with one.  You may call the Forms Department with any questions at [phone].  Their fax number is [phone].

## 2025-05-07 ENCOUNTER — LAB ENCOUNTER (OUTPATIENT)
Dept: LAB | Age: 75
End: 2025-05-07
Attending: STUDENT IN AN ORGANIZED HEALTH CARE EDUCATION/TRAINING PROGRAM
Payer: MEDICARE

## 2025-05-07 DIAGNOSIS — E83.52 SERUM CALCIUM ELEVATED: ICD-10-CM

## 2025-05-07 LAB
ALBUMIN SERPL-MCNC: 4.7 G/DL (ref 3.2–4.8)
ALBUMIN/GLOB SERPL: 1.6 {RATIO} (ref 1–2)
ALP LIVER SERPL-CCNC: 61 U/L (ref 55–142)
ALT SERPL-CCNC: 25 U/L (ref 10–49)
ANION GAP SERPL CALC-SCNC: 14 MMOL/L (ref 0–18)
AST SERPL-CCNC: 31 U/L (ref ?–34)
BILIRUB SERPL-MCNC: 0.5 MG/DL (ref 0.2–1.1)
BUN BLD-MCNC: 19 MG/DL (ref 9–23)
CALCIUM BLD-MCNC: 11.1 MG/DL (ref 8.7–10.6)
CHLORIDE SERPL-SCNC: 99 MMOL/L (ref 98–112)
CO2 SERPL-SCNC: 28 MMOL/L (ref 21–32)
CREAT BLD-MCNC: 1.28 MG/DL (ref 0.55–1.02)
EGFRCR SERPLBLD CKD-EPI 2021: 44 ML/MIN/1.73M2 (ref 60–?)
FASTING STATUS PATIENT QL REPORTED: YES
GLOBULIN PLAS-MCNC: 2.9 G/DL (ref 2–3.5)
GLUCOSE BLD-MCNC: 89 MG/DL (ref 70–99)
OSMOLALITY SERPL CALC.SUM OF ELEC: 294 MOSM/KG (ref 275–295)
POTASSIUM SERPL-SCNC: 3.7 MMOL/L (ref 3.5–5.1)
PROT SERPL-MCNC: 7.6 G/DL (ref 5.7–8.2)
SODIUM SERPL-SCNC: 141 MMOL/L (ref 136–145)

## 2025-05-07 PROCEDURE — 80053 COMPREHEN METABOLIC PANEL: CPT

## 2025-05-07 PROCEDURE — 36415 COLL VENOUS BLD VENIPUNCTURE: CPT

## 2025-05-08 DIAGNOSIS — E83.52 HYPERCALCEMIA: Primary | ICD-10-CM

## 2025-06-13 ENCOUNTER — MED REC SCAN ONLY (OUTPATIENT)
Dept: FAMILY MEDICINE CLINIC | Facility: CLINIC | Age: 75
End: 2025-06-13

## 2025-07-07 ENCOUNTER — TELEPHONE (OUTPATIENT)
Dept: FAMILY MEDICINE CLINIC | Facility: CLINIC | Age: 75
End: 2025-07-07

## 2025-07-07 NOTE — TELEPHONE ENCOUNTER
FARHAD\" Dr. Arreola patient. I  called and spoke to the patient. She ate blueberries on Thursday and Friday and started feeling ill. She stated \" I wonder if I had Listeria.\" She had vomiting on Friday. That did resolve on Friday night. She had a fever up to 100.0. That is better now. No diarrhea. The nausea has also improved. She has been keeping fluids down and crackers. I advised her to continue to slowly advance her diet. Try to drink some gatorade to help replenish her electrolytes. If symptoms do not continue to improve or anything worsens to please call the office. Pt. Agreed to plan and verbalized understanding

## 2025-07-07 NOTE — TELEPHONE ENCOUNTER
1. What are your symptoms?  Currently fatigue, nauseas     Vomiting subsided Saturday and fever broke Saturday night       2. How long have you been having these symptoms?  Thursday       3. Have you done anything already to treat your symptoms?   Ensure protein, the only food she is able to keep down       ADDITIONAL INFO:

## 2025-07-10 ENCOUNTER — PATIENT MESSAGE (OUTPATIENT)
Dept: FAMILY MEDICINE CLINIC | Facility: CLINIC | Age: 75
End: 2025-07-10

## 2025-07-10 NOTE — TELEPHONE ENCOUNTER
Patient is feeling better. Nausea has improved no fever, eating BRAT diet and drinking ensure which has been helpful and Gator aide. Her main concern is still with fatigue. She would like to know if there is anything else she can do for that?     I will call the patient back and assist her with making an appointment for September or October with Dr. Arreola.

## 2025-07-10 NOTE — TELEPHONE ENCOUNTER
I called the patient and advised her to continue to push fluids and rest. Advance diet slowly. It can take tome to regain strength. If no better by Monday patient to call the office. I assisted the patient in making an appointment for a medication visit with Dr. Arreola for 09/15/25 at 1:30 pm. Pt. Agreed to plan and verbalized understanding

## 2025-07-10 NOTE — TELEPHONE ENCOUNTER
She had multiple episodes of vomiting and nausea this can cause continued fatigue which can last up to 7 to 10 days continue with present management

## 2025-07-17 DIAGNOSIS — N95.1 FLUSHING, MENOPAUSAL: ICD-10-CM

## 2025-07-17 DIAGNOSIS — Z78.0 POSTMENOPAUSAL: ICD-10-CM

## 2025-07-21 RX ORDER — ESTRADIOL 1 MG/1
1 TABLET ORAL DAILY
Qty: 90 TABLET | Refills: 0 | Status: SHIPPED | OUTPATIENT
Start: 2025-07-21

## 2025-07-21 NOTE — TELEPHONE ENCOUNTER
Requested Prescriptions     Pending Prescriptions Disp Refills    ESTRADIOL 1 MG Oral Tab [Pharmacy Med Name: Estradiol 1 Mg Tab Nort] 90 tablet 0     Sig: TAKE ONE TABLET BY MOUTH ONE TIME DAILY     Last refill 2/28/25 #90  LOV 4/2/25  Future Appointments   Date Time Provider Department Center   9/15/2025  1:30 PM Gabriela Arreola MD [facility] [id number]       Gynecology Medication Protocol Failed 07/17/2025 05:44 PM   Protocol Details PASS--PENDING LAST PAP WNL--VIA MANUAL LOOKUP    Mammogram in past 12 months    Medication is active on med list    Physical or Pelvic/Breast in past 12 or next 3 mos--VIA MANUAL LOOKUP       To be filled at: [id number] - [place], [place] - [address] [phone], [phone]

## (undated) DIAGNOSIS — N95.1 FLUSHING, MENOPAUSAL: ICD-10-CM

## (undated) DIAGNOSIS — E55.9 VITAMIN D DEFICIENCY: Primary | ICD-10-CM

## (undated) DIAGNOSIS — R73.9 ELEVATED BLOOD SUGAR: ICD-10-CM

## (undated) DIAGNOSIS — E78.00 HYPERCHOLESTEREMIA: ICD-10-CM

## (undated) DIAGNOSIS — I10 BENIGN ESSENTIAL HTN: ICD-10-CM

## (undated) DIAGNOSIS — E78.00 HYPERCHOLESTEREMIA: Primary | ICD-10-CM

## (undated) DIAGNOSIS — Z78.0 POSTMENOPAUSAL: ICD-10-CM

## (undated) DEVICE — BARD® PTFE FELT PLEDGETS, (OVAL), 4.8 MM X 6 MM: Brand: BARD® PTFE FELT PLEDGETS

## (undated) DEVICE — ENDOSCOPY PACK - LOWER: Brand: MEDLINE INDUSTRIES, INC.

## (undated) DEVICE — FORCEP BIOPSY RJ4 LG CAP W/ND

## (undated) DEVICE — COLUMN DRAPE

## (undated) DEVICE — EXOFIN TISSUE ADHESIVE 1.0ML

## (undated) DEVICE — 40580 - THE PINK PAD - ADVANCED TRENDELENBURG POSITIONING KIT: Brand: 40580 - THE PINK PAD - ADVANCED TRENDELENBURG POSITIONING KIT

## (undated) DEVICE — VISUALIZATION SYSTEM: Brand: CLEARIFY

## (undated) DEVICE — FILTERLINE NASAL ADULT O2/CO2

## (undated) DEVICE — 1200CC GUARDIAN II: Brand: GUARDIAN

## (undated) DEVICE — SUTURE ETHIBOND 0 SH

## (undated) DEVICE — TIP-UP FENESTRATED GRASPER: Brand: ENDOWRIST

## (undated) DEVICE — SOL  .9 1000ML BAG

## (undated) DEVICE — ENDOSCOPY PACK UPPER: Brand: MEDLINE INDUSTRIES, INC.

## (undated) DEVICE — VESSEL SEALER EXTEND: Brand: ENDOWRIST

## (undated) DEVICE — STANDARD HYPODERMIC NEEDLE,POLYPROPYLENE HUB: Brand: MONOJECT

## (undated) DEVICE — MEGA SUTURECUT ND: Brand: ENDOWRIST

## (undated) DEVICE — TIP COVER ACCESSORY

## (undated) DEVICE — STERILE SYNTHETIC POLYISOPRENE POWDER-FREE SURGICAL GLOVES WITH HYDROGEL COATING, SMOOTH FINISH, STRAIGHT FINGER: Brand: PROTEXIS

## (undated) DEVICE — FENESTRATED BIPOLAR FORCEPS: Brand: ENDOWRIST

## (undated) DEVICE — CANNULA SEAL

## (undated) DEVICE — HARMONIC 1100 SHEARS, 36CM SHAFT LENGTH: Brand: HARMONIC

## (undated) DEVICE — MONOPOLAR CURVED SCISSORS: Brand: ENDOWRIST

## (undated) DEVICE — Device

## (undated) DEVICE — INSUFFLATION NEEDLE TO ESTABLISH PNEUMOPERITONEUM.: Brand: INSUFFLATION NEEDLE

## (undated) DEVICE — Device: Brand: DEFENDO AIR/WATER/SUCTION AND BIOPSY VALVE

## (undated) DEVICE — ROBOTIC GENERAL: Brand: MEDLINE INDUSTRIES, INC.

## (undated) DEVICE — STRL PENROSE DRAIN 18" X 1/4": Brand: CARDINAL HEALTH

## (undated) DEVICE — LUBRICANT JLY SURGILUBE 2OZ

## (undated) DEVICE — SUTURE MONOCRYL 4-0 PS-2

## (undated) DEVICE — 3M™ RED DOT™ MONITORING ELECTRODE WITH FOAM TAPE AND STICKY GEL, 50/BAG, 20/CASE, 72/PLT 2570: Brand: RED DOT™

## (undated) DEVICE — ARM DRAPE

## (undated) DEVICE — 2, DISPOSABLE SUCTION/IRRIGATOR WITHOUT DISPOSABLE TIP: Brand: STRYKEFLOW

## (undated) DEVICE — TROCAR: Brand: KII FIOS FIRST ENTRY

## (undated) DEVICE — BLADELESS OBTURATOR: Brand: WECK VISTA

## (undated) DEVICE — CLIP HEMOLOK LARGE PURPLE

## (undated) NOTE — LETTER
10/11/2021      [facility] [id number]    Dear Gay Godoy,     Here are your results from your recent visit with Gastroenterology. You will be happy to know that your COVID 19 test returned NEGATIVE.     If you need a

## (undated) NOTE — LETTER
8/3/2018    [address] [address]    Dear Ms. Valerio,     Please contact our office to schedule a Medicare annaul visit with your doctor after 11/03/2018 to address important healthcare needs.   It is important that yo

## (undated) NOTE — LETTER
Date: 4/3/2019    Patient Name: Wyatt Echavarriatt    To [facility] [employer] [place]      To Whom it may concern:     This letter has been written at the patient's request. Please be advised that Dain Wills experienced a medical condition, and was subsequently h

## (undated) NOTE — Clinical Note
Initial assessment completed with patient. Pt confirms appt today with [facility] Dr. Logan. Pt declines SYLVIA/ER f/u with you--aware PCP agreeable for pt to keep 4/02/25 appt, previously scheduled. Patient has met goals, no further outreach needed. Thank you!  Future Appointments 4/2/2025   8:00 AM    Gabriela Arreola MD   [facility]              [id number]

## (undated) NOTE — LETTER
12/13/2017          [address]  [place]    Dear Salbador Sumner,     Here are the biopsy/pathology findings from your recent EGD (uppper endoscopy) and colonoscopy:     The biopsy/pathology findings from your upper endoscopy

## (undated) NOTE — LETTER
Your patient was recently seen at the [facility] for a hospital follow-up visit. The visit note is attached. Please contact the clinic with any questions at [phone].     Thank you,  CELSO Costello